# Patient Record
Sex: MALE | Race: BLACK OR AFRICAN AMERICAN | NOT HISPANIC OR LATINO | Employment: UNEMPLOYED | URBAN - METROPOLITAN AREA
[De-identification: names, ages, dates, MRNs, and addresses within clinical notes are randomized per-mention and may not be internally consistent; named-entity substitution may affect disease eponyms.]

---

## 2021-01-13 ENCOUNTER — LAB VISIT (OUTPATIENT)
Dept: LAB | Facility: HOSPITAL | Age: 66
End: 2021-01-13
Attending: FAMILY MEDICINE
Payer: MEDICAID

## 2021-01-13 ENCOUNTER — OFFICE VISIT (OUTPATIENT)
Dept: INTERNAL MEDICINE | Facility: CLINIC | Age: 66
End: 2021-01-13
Payer: MEDICAID

## 2021-01-13 ENCOUNTER — TELEPHONE (OUTPATIENT)
Dept: INTERNAL MEDICINE | Facility: CLINIC | Age: 66
End: 2021-01-13

## 2021-01-13 VITALS
WEIGHT: 311.94 LBS | HEIGHT: 68 IN | HEART RATE: 96 BPM | DIASTOLIC BLOOD PRESSURE: 82 MMHG | OXYGEN SATURATION: 96 % | TEMPERATURE: 100 F | SYSTOLIC BLOOD PRESSURE: 160 MMHG | BODY MASS INDEX: 47.28 KG/M2

## 2021-01-13 DIAGNOSIS — Z12.5 SCREENING FOR PROSTATE CANCER: ICD-10-CM

## 2021-01-13 DIAGNOSIS — I48.91 ATRIAL FIBRILLATION, UNSPECIFIED TYPE: ICD-10-CM

## 2021-01-13 DIAGNOSIS — I50.9 CHRONIC CONGESTIVE HEART FAILURE, UNSPECIFIED HEART FAILURE TYPE: ICD-10-CM

## 2021-01-13 DIAGNOSIS — Z12.11 COLON CANCER SCREENING: ICD-10-CM

## 2021-01-13 DIAGNOSIS — Z28.39 IMMUNIZATION DEFICIENCY: ICD-10-CM

## 2021-01-13 DIAGNOSIS — E11.9 CONTROLLED TYPE 2 DIABETES MELLITUS WITHOUT COMPLICATION, WITHOUT LONG-TERM CURRENT USE OF INSULIN: ICD-10-CM

## 2021-01-13 DIAGNOSIS — R60.9 EDEMA, UNSPECIFIED TYPE: ICD-10-CM

## 2021-01-13 DIAGNOSIS — J30.9 CHRONIC ALLERGIC RHINITIS: ICD-10-CM

## 2021-01-13 DIAGNOSIS — Z76.89 ENCOUNTER TO ESTABLISH CARE WITH NEW DOCTOR: Primary | ICD-10-CM

## 2021-01-13 DIAGNOSIS — E05.90 HYPERTHYROIDISM: ICD-10-CM

## 2021-01-13 LAB
BASOPHILS # BLD AUTO: 0.02 K/UL (ref 0–0.2)
BASOPHILS NFR BLD: 0.2 % (ref 0–1.9)
DIFFERENTIAL METHOD: ABNORMAL
EOSINOPHIL # BLD AUTO: 0 K/UL (ref 0–0.5)
EOSINOPHIL NFR BLD: 0.4 % (ref 0–8)
ERYTHROCYTE [DISTWIDTH] IN BLOOD BY AUTOMATED COUNT: 17.8 % (ref 11.5–14.5)
HCT VFR BLD AUTO: 51.7 % (ref 40–54)
HGB BLD-MCNC: 15.7 G/DL (ref 14–18)
IMM GRANULOCYTES # BLD AUTO: 0.03 K/UL (ref 0–0.04)
IMM GRANULOCYTES NFR BLD AUTO: 0.3 % (ref 0–0.5)
LYMPHOCYTES # BLD AUTO: 2.5 K/UL (ref 1–4.8)
LYMPHOCYTES NFR BLD: 25.2 % (ref 18–48)
MCH RBC QN AUTO: 24.8 PG (ref 27–31)
MCHC RBC AUTO-ENTMCNC: 30.4 G/DL (ref 32–36)
MCV RBC AUTO: 82 FL (ref 82–98)
MONOCYTES # BLD AUTO: 0.9 K/UL (ref 0.3–1)
MONOCYTES NFR BLD: 8.8 % (ref 4–15)
NEUTROPHILS # BLD AUTO: 6.5 K/UL (ref 1.8–7.7)
NEUTROPHILS NFR BLD: 65.1 % (ref 38–73)
NRBC BLD-RTO: 0 /100 WBC
PLATELET # BLD AUTO: 246 K/UL (ref 150–350)
PMV BLD AUTO: 11.9 FL (ref 9.2–12.9)
RBC # BLD AUTO: 6.32 M/UL (ref 4.6–6.2)
WBC # BLD AUTO: 9.98 K/UL (ref 3.9–12.7)

## 2021-01-13 PROCEDURE — 83036 HEMOGLOBIN GLYCOSYLATED A1C: CPT

## 2021-01-13 PROCEDURE — 80061 LIPID PANEL: CPT

## 2021-01-13 PROCEDURE — 90686 IIV4 VACC NO PRSV 0.5 ML IM: CPT | Mod: PBBFAC

## 2021-01-13 PROCEDURE — 99215 OFFICE O/P EST HI 40 MIN: CPT | Mod: PBBFAC,25 | Performed by: FAMILY MEDICINE

## 2021-01-13 PROCEDURE — 84480 ASSAY TRIIODOTHYRONINE (T3): CPT

## 2021-01-13 PROCEDURE — 84443 ASSAY THYROID STIM HORMONE: CPT

## 2021-01-13 PROCEDURE — 99204 OFFICE O/P NEW MOD 45 MIN: CPT | Mod: S$PBB,,, | Performed by: FAMILY MEDICINE

## 2021-01-13 PROCEDURE — 36415 COLL VENOUS BLD VENIPUNCTURE: CPT

## 2021-01-13 PROCEDURE — 99204 PR OFFICE/OUTPT VISIT, NEW, LEVL IV, 45-59 MIN: ICD-10-PCS | Mod: S$PBB,,, | Performed by: FAMILY MEDICINE

## 2021-01-13 PROCEDURE — 84439 ASSAY OF FREE THYROXINE: CPT

## 2021-01-13 PROCEDURE — 99999 PR PBB SHADOW E&M-EST. PATIENT-LVL V: CPT | Mod: PBBFAC,,, | Performed by: FAMILY MEDICINE

## 2021-01-13 PROCEDURE — 99999 PR PBB SHADOW E&M-EST. PATIENT-LVL V: ICD-10-PCS | Mod: PBBFAC,,, | Performed by: FAMILY MEDICINE

## 2021-01-13 PROCEDURE — 85025 COMPLETE CBC W/AUTO DIFF WBC: CPT

## 2021-01-13 PROCEDURE — 84153 ASSAY OF PSA TOTAL: CPT

## 2021-01-13 PROCEDURE — 80053 COMPREHEN METABOLIC PANEL: CPT

## 2021-01-13 RX ORDER — METOPROLOL SUCCINATE 50 MG/1
50 TABLET, EXTENDED RELEASE ORAL DAILY
Qty: 90 TABLET | Refills: 3 | Status: SHIPPED | OUTPATIENT
Start: 2021-01-13 | End: 2022-09-08 | Stop reason: SDUPTHER

## 2021-01-13 RX ORDER — METHIMAZOLE 5 MG/1
5 TABLET ORAL
COMMUNITY
Start: 2020-07-21 | End: 2021-01-13 | Stop reason: SDUPTHER

## 2021-01-13 RX ORDER — METFORMIN HYDROCHLORIDE 500 MG/1
TABLET ORAL
COMMUNITY
Start: 2020-03-25 | End: 2021-01-13 | Stop reason: SDUPTHER

## 2021-01-13 RX ORDER — METHIMAZOLE 5 MG/1
5 TABLET ORAL DAILY
Qty: 90 TABLET | Refills: 3 | Status: SHIPPED | OUTPATIENT
Start: 2021-01-13 | End: 2022-03-11

## 2021-01-13 RX ORDER — CETIRIZINE HYDROCHLORIDE 10 MG/1
10 TABLET ORAL DAILY
Qty: 90 TABLET | Refills: 3 | Status: SHIPPED | OUTPATIENT
Start: 2021-01-13 | End: 2022-09-08 | Stop reason: SDUPTHER

## 2021-01-13 RX ORDER — CETIRIZINE HYDROCHLORIDE 10 MG/1
TABLET ORAL
COMMUNITY
Start: 2020-07-21 | End: 2021-01-13 | Stop reason: SDUPTHER

## 2021-01-13 RX ORDER — METFORMIN HYDROCHLORIDE 500 MG/1
500 TABLET ORAL 2 TIMES DAILY WITH MEALS
Qty: 180 TABLET | Refills: 3 | Status: SHIPPED | OUTPATIENT
Start: 2021-01-13 | End: 2022-09-08 | Stop reason: SDUPTHER

## 2021-01-13 RX ORDER — METOPROLOL SUCCINATE 50 MG/1
TABLET, EXTENDED RELEASE ORAL
COMMUNITY
Start: 2020-06-25 | End: 2021-01-13 | Stop reason: SDUPTHER

## 2021-01-14 DIAGNOSIS — E05.90 HYPERTHYROIDISM: Primary | ICD-10-CM

## 2021-01-14 LAB
ALBUMIN SERPL BCP-MCNC: 3.6 G/DL (ref 3.5–5.2)
ALP SERPL-CCNC: 97 U/L (ref 55–135)
ALT SERPL W/O P-5'-P-CCNC: 14 U/L (ref 10–44)
ANION GAP SERPL CALC-SCNC: 10 MMOL/L (ref 8–16)
AST SERPL-CCNC: 10 U/L (ref 10–40)
BILIRUB SERPL-MCNC: 0.4 MG/DL (ref 0.1–1)
BUN SERPL-MCNC: 11 MG/DL (ref 8–23)
CALCIUM SERPL-MCNC: 8.7 MG/DL (ref 8.7–10.5)
CHLORIDE SERPL-SCNC: 106 MMOL/L (ref 95–110)
CHOLEST SERPL-MCNC: 151 MG/DL (ref 120–199)
CHOLEST/HDLC SERPL: 3.4 {RATIO} (ref 2–5)
CO2 SERPL-SCNC: 24 MMOL/L (ref 23–29)
COMPLEXED PSA SERPL-MCNC: 5.3 NG/ML (ref 0–4)
CREAT SERPL-MCNC: 0.9 MG/DL (ref 0.5–1.4)
EST. GFR  (AFRICAN AMERICAN): >60 ML/MIN/1.73 M^2
EST. GFR  (NON AFRICAN AMERICAN): >60 ML/MIN/1.73 M^2
ESTIMATED AVG GLUCOSE: 120 MG/DL (ref 68–131)
GLUCOSE SERPL-MCNC: 104 MG/DL (ref 70–110)
HBA1C MFR BLD HPLC: 5.8 % (ref 4–5.6)
HDLC SERPL-MCNC: 44 MG/DL (ref 40–75)
HDLC SERPL: 29.1 % (ref 20–50)
LDLC SERPL CALC-MCNC: 93 MG/DL (ref 63–159)
NONHDLC SERPL-MCNC: 107 MG/DL
POTASSIUM SERPL-SCNC: 3.8 MMOL/L (ref 3.5–5.1)
PROT SERPL-MCNC: 7.5 G/DL (ref 6–8.4)
SODIUM SERPL-SCNC: 140 MMOL/L (ref 136–145)
T3 SERPL-MCNC: 177 NG/DL (ref 60–180)
T4 FREE SERPL-MCNC: 1.81 NG/DL (ref 0.71–1.51)
TRIGL SERPL-MCNC: 70 MG/DL (ref 30–150)
TSH SERPL DL<=0.005 MIU/L-ACNC: <0.01 UIU/ML (ref 0.4–4)

## 2021-02-04 ENCOUNTER — OFFICE VISIT (OUTPATIENT)
Dept: INTERNAL MEDICINE | Facility: CLINIC | Age: 66
End: 2021-02-04
Payer: MEDICAID

## 2021-02-04 VITALS
HEART RATE: 106 BPM | WEIGHT: 311.5 LBS | HEIGHT: 68 IN | BODY MASS INDEX: 47.21 KG/M2 | SYSTOLIC BLOOD PRESSURE: 194 MMHG | DIASTOLIC BLOOD PRESSURE: 74 MMHG | TEMPERATURE: 98 F | OXYGEN SATURATION: 97 %

## 2021-02-04 DIAGNOSIS — E11.9 CONTROLLED TYPE 2 DIABETES MELLITUS WITHOUT COMPLICATION, WITHOUT LONG-TERM CURRENT USE OF INSULIN: ICD-10-CM

## 2021-02-04 DIAGNOSIS — I10 HYPERTENSION, UNCONTROLLED: Primary | ICD-10-CM

## 2021-02-04 DIAGNOSIS — I48.91 ATRIAL FIBRILLATION, UNSPECIFIED TYPE: ICD-10-CM

## 2021-02-04 DIAGNOSIS — E05.90 HYPERTHYROIDISM: ICD-10-CM

## 2021-02-04 DIAGNOSIS — K04.7 DENTAL INFECTION: ICD-10-CM

## 2021-02-04 DIAGNOSIS — I50.9 CHRONIC CONGESTIVE HEART FAILURE, UNSPECIFIED HEART FAILURE TYPE: ICD-10-CM

## 2021-02-04 PROCEDURE — 99214 PR OFFICE/OUTPT VISIT, EST, LEVL IV, 30-39 MIN: ICD-10-PCS | Mod: S$PBB,,, | Performed by: FAMILY MEDICINE

## 2021-02-04 PROCEDURE — 99999 PR PBB SHADOW E&M-EST. PATIENT-LVL III: ICD-10-PCS | Mod: PBBFAC,,, | Performed by: FAMILY MEDICINE

## 2021-02-04 PROCEDURE — 99214 OFFICE O/P EST MOD 30 MIN: CPT | Mod: S$PBB,,, | Performed by: FAMILY MEDICINE

## 2021-02-04 PROCEDURE — 99999 PR PBB SHADOW E&M-EST. PATIENT-LVL III: CPT | Mod: PBBFAC,,, | Performed by: FAMILY MEDICINE

## 2021-02-04 PROCEDURE — 99213 OFFICE O/P EST LOW 20 MIN: CPT | Mod: PBBFAC | Performed by: FAMILY MEDICINE

## 2021-02-04 RX ORDER — AMOXICILLIN 500 MG/1
500 CAPSULE ORAL EVERY 8 HOURS
Qty: 30 CAPSULE | Refills: 0 | Status: SHIPPED | OUTPATIENT
Start: 2021-02-04 | End: 2022-03-04

## 2021-02-04 RX ORDER — AMLODIPINE BESYLATE 10 MG/1
10 TABLET ORAL DAILY
Qty: 30 TABLET | Refills: 11 | Status: SHIPPED | OUTPATIENT
Start: 2021-02-04 | End: 2022-09-08 | Stop reason: SDUPTHER

## 2021-03-01 ENCOUNTER — TELEPHONE (OUTPATIENT)
Dept: INTERNAL MEDICINE | Facility: CLINIC | Age: 66
End: 2021-03-01

## 2021-03-08 ENCOUNTER — PATIENT MESSAGE (OUTPATIENT)
Dept: ADMINISTRATIVE | Facility: HOSPITAL | Age: 66
End: 2021-03-08

## 2021-04-14 ENCOUNTER — PATIENT OUTREACH (OUTPATIENT)
Dept: ADMINISTRATIVE | Facility: OTHER | Age: 66
End: 2021-04-14

## 2021-04-15 ENCOUNTER — OFFICE VISIT (OUTPATIENT)
Dept: OPHTHALMOLOGY | Facility: CLINIC | Age: 66
End: 2021-04-15
Payer: MEDICAID

## 2021-04-15 DIAGNOSIS — H40.013 AT LOW RISK FOR OPEN-ANGLE GLAUCOMA IN BOTH EYES: ICD-10-CM

## 2021-04-15 DIAGNOSIS — E11.36 DIABETIC CATARACT: ICD-10-CM

## 2021-04-15 DIAGNOSIS — E11.9 DIABETES MELLITUS TYPE 2 WITHOUT RETINOPATHY: Primary | ICD-10-CM

## 2021-04-15 DIAGNOSIS — H52.7 REFRACTIVE ERRORS: ICD-10-CM

## 2021-04-15 PROCEDURE — 92015 DETERMINE REFRACTIVE STATE: CPT | Mod: ,,, | Performed by: OPTOMETRIST

## 2021-04-15 PROCEDURE — 92004 PR EYE EXAM, NEW PATIENT,COMPREHESV: ICD-10-PCS | Mod: S$PBB,,, | Performed by: OPTOMETRIST

## 2021-04-15 PROCEDURE — 92004 COMPRE OPH EXAM NEW PT 1/>: CPT | Mod: S$PBB,,, | Performed by: OPTOMETRIST

## 2021-04-15 PROCEDURE — 99212 OFFICE O/P EST SF 10 MIN: CPT | Mod: PBBFAC | Performed by: OPTOMETRIST

## 2021-04-15 PROCEDURE — 92015 PR REFRACTION: ICD-10-PCS | Mod: ,,, | Performed by: OPTOMETRIST

## 2021-04-15 PROCEDURE — 99999 PR PBB SHADOW E&M-EST. PATIENT-LVL II: CPT | Mod: PBBFAC,,, | Performed by: OPTOMETRIST

## 2021-04-15 PROCEDURE — 99999 PR PBB SHADOW E&M-EST. PATIENT-LVL II: ICD-10-PCS | Mod: PBBFAC,,, | Performed by: OPTOMETRIST

## 2021-04-29 ENCOUNTER — PATIENT OUTREACH (OUTPATIENT)
Dept: ADMINISTRATIVE | Facility: HOSPITAL | Age: 66
End: 2021-04-29

## 2021-06-11 ENCOUNTER — TELEPHONE (OUTPATIENT)
Dept: OPHTHALMOLOGY | Facility: CLINIC | Age: 66
End: 2021-06-11

## 2021-06-16 ENCOUNTER — PATIENT OUTREACH (OUTPATIENT)
Dept: ADMINISTRATIVE | Facility: OTHER | Age: 66
End: 2021-06-16

## 2021-07-28 DIAGNOSIS — E11.9 TYPE 2 DIABETES MELLITUS WITHOUT COMPLICATION: ICD-10-CM

## 2021-08-04 ENCOUNTER — PATIENT MESSAGE (OUTPATIENT)
Dept: ADMINISTRATIVE | Facility: HOSPITAL | Age: 66
End: 2021-08-04

## 2021-11-03 ENCOUNTER — PATIENT MESSAGE (OUTPATIENT)
Dept: ADMINISTRATIVE | Facility: HOSPITAL | Age: 66
End: 2021-11-03
Payer: MEDICAID

## 2022-01-19 DIAGNOSIS — E11.9 TYPE 2 DIABETES MELLITUS WITHOUT COMPLICATION: ICD-10-CM

## 2022-02-03 DIAGNOSIS — E11.9 CONTROLLED TYPE 2 DIABETES MELLITUS WITHOUT COMPLICATION, WITHOUT LONG-TERM CURRENT USE OF INSULIN: ICD-10-CM

## 2022-02-24 ENCOUNTER — PATIENT OUTREACH (OUTPATIENT)
Dept: ADMINISTRATIVE | Facility: HOSPITAL | Age: 67
End: 2022-02-24
Payer: MEDICAID

## 2022-02-24 DIAGNOSIS — E11.9 CONTROLLED TYPE 2 DIABETES MELLITUS WITHOUT COMPLICATION, WITHOUT LONG-TERM CURRENT USE OF INSULIN: Primary | ICD-10-CM

## 2022-02-24 NOTE — PROGRESS NOTES
Working A1C Report.    Pt has appt for annual exam scheduled, 3/04/22.  Assisted to schedule labs prior to appt, 2/28/22.

## 2022-02-28 ENCOUNTER — LAB VISIT (OUTPATIENT)
Dept: LAB | Facility: HOSPITAL | Age: 67
End: 2022-02-28
Attending: FAMILY MEDICINE
Payer: MEDICAID

## 2022-02-28 ENCOUNTER — PATIENT OUTREACH (OUTPATIENT)
Dept: ADMINISTRATIVE | Facility: HOSPITAL | Age: 67
End: 2022-02-28
Payer: MEDICAID

## 2022-02-28 DIAGNOSIS — E11.9 CONTROLLED TYPE 2 DIABETES MELLITUS WITHOUT COMPLICATION, WITHOUT LONG-TERM CURRENT USE OF INSULIN: ICD-10-CM

## 2022-02-28 DIAGNOSIS — E11.9 TYPE 2 DIABETES MELLITUS WITHOUT COMPLICATION: ICD-10-CM

## 2022-02-28 LAB
ALBUMIN SERPL BCP-MCNC: 3.4 G/DL (ref 3.5–5.2)
ALP SERPL-CCNC: 80 U/L (ref 55–135)
ALT SERPL W/O P-5'-P-CCNC: 11 U/L (ref 10–44)
ANION GAP SERPL CALC-SCNC: 9 MMOL/L (ref 8–16)
AST SERPL-CCNC: 9 U/L (ref 10–40)
BILIRUB SERPL-MCNC: 0.3 MG/DL (ref 0.1–1)
BUN SERPL-MCNC: 9 MG/DL (ref 8–23)
CALCIUM SERPL-MCNC: 9.1 MG/DL (ref 8.7–10.5)
CHLORIDE SERPL-SCNC: 107 MMOL/L (ref 95–110)
CHOLEST SERPL-MCNC: 141 MG/DL (ref 120–199)
CHOLEST/HDLC SERPL: 3.1 {RATIO} (ref 2–5)
CO2 SERPL-SCNC: 24 MMOL/L (ref 23–29)
CREAT SERPL-MCNC: 0.8 MG/DL (ref 0.5–1.4)
EST. GFR  (AFRICAN AMERICAN): >60 ML/MIN/1.73 M^2
EST. GFR  (NON AFRICAN AMERICAN): >60 ML/MIN/1.73 M^2
ESTIMATED AVG GLUCOSE: 123 MG/DL (ref 68–131)
GLUCOSE SERPL-MCNC: 105 MG/DL (ref 70–110)
HBA1C MFR BLD: 5.9 % (ref 4–5.6)
HDLC SERPL-MCNC: 45 MG/DL (ref 40–75)
HDLC SERPL: 31.9 % (ref 20–50)
LDLC SERPL CALC-MCNC: 78 MG/DL (ref 63–159)
NONHDLC SERPL-MCNC: 96 MG/DL
POTASSIUM SERPL-SCNC: 3.9 MMOL/L (ref 3.5–5.1)
PROT SERPL-MCNC: 7.3 G/DL (ref 6–8.4)
SODIUM SERPL-SCNC: 140 MMOL/L (ref 136–145)
TRIGL SERPL-MCNC: 90 MG/DL (ref 30–150)

## 2022-02-28 PROCEDURE — 36415 COLL VENOUS BLD VENIPUNCTURE: CPT | Performed by: FAMILY MEDICINE

## 2022-02-28 PROCEDURE — 80053 COMPREHEN METABOLIC PANEL: CPT | Performed by: FAMILY MEDICINE

## 2022-02-28 PROCEDURE — 83036 HEMOGLOBIN GLYCOSYLATED A1C: CPT | Performed by: FAMILY MEDICINE

## 2022-02-28 PROCEDURE — 80061 LIPID PANEL: CPT | Performed by: FAMILY MEDICINE

## 2022-03-04 ENCOUNTER — LAB VISIT (OUTPATIENT)
Dept: LAB | Facility: HOSPITAL | Age: 67
End: 2022-03-04
Attending: INTERNAL MEDICINE
Payer: MEDICAID

## 2022-03-04 ENCOUNTER — OFFICE VISIT (OUTPATIENT)
Dept: INTERNAL MEDICINE | Facility: CLINIC | Age: 67
End: 2022-03-04
Payer: MEDICAID

## 2022-03-04 ENCOUNTER — TELEPHONE (OUTPATIENT)
Dept: INTERNAL MEDICINE | Facility: CLINIC | Age: 67
End: 2022-03-04
Payer: MEDICAID

## 2022-03-04 VITALS
HEIGHT: 71 IN | DIASTOLIC BLOOD PRESSURE: 80 MMHG | WEIGHT: 315 LBS | BODY MASS INDEX: 44.1 KG/M2 | HEART RATE: 83 BPM | SYSTOLIC BLOOD PRESSURE: 154 MMHG | OXYGEN SATURATION: 98 %

## 2022-03-04 DIAGNOSIS — I50.9 CHRONIC CONGESTIVE HEART FAILURE, UNSPECIFIED HEART FAILURE TYPE: ICD-10-CM

## 2022-03-04 DIAGNOSIS — E05.90 HYPERTHYROIDISM: ICD-10-CM

## 2022-03-04 DIAGNOSIS — I48.91 ATRIAL FIBRILLATION, UNSPECIFIED TYPE: ICD-10-CM

## 2022-03-04 DIAGNOSIS — R60.9 EDEMA, UNSPECIFIED TYPE: ICD-10-CM

## 2022-03-04 DIAGNOSIS — E11.59 HYPERTENSION ASSOCIATED WITH DIABETES: ICD-10-CM

## 2022-03-04 DIAGNOSIS — I25.10 CORONARY ARTERY DISEASE, UNSPECIFIED VESSEL OR LESION TYPE, UNSPECIFIED WHETHER ANGINA PRESENT, UNSPECIFIED WHETHER NATIVE OR TRANSPLANTED HEART: ICD-10-CM

## 2022-03-04 DIAGNOSIS — I15.2 HYPERTENSION ASSOCIATED WITH DIABETES: ICD-10-CM

## 2022-03-04 DIAGNOSIS — E11.9 CONTROLLED TYPE 2 DIABETES MELLITUS WITHOUT COMPLICATION, WITHOUT LONG-TERM CURRENT USE OF INSULIN: ICD-10-CM

## 2022-03-04 DIAGNOSIS — Z00.00 ROUTINE GENERAL MEDICAL EXAMINATION AT A HEALTH CARE FACILITY: Primary | ICD-10-CM

## 2022-03-04 PROBLEM — E66.01 MORBID OBESITY WITH BODY MASS INDEX (BMI) OF 40.0 TO 44.9 IN ADULT: Status: ACTIVE | Noted: 2022-03-04

## 2022-03-04 LAB
BNP SERPL-MCNC: <10 PG/ML (ref 0–99)
DIGOXIN SERPL-MCNC: <0.2 NG/ML (ref 0.8–2)
T4 FREE SERPL-MCNC: 1.08 NG/DL (ref 0.71–1.51)
TSH SERPL DL<=0.005 MIU/L-ACNC: <0.01 UIU/ML (ref 0.4–4)

## 2022-03-04 PROCEDURE — 99999 PR PBB SHADOW E&M-EST. PATIENT-LVL III: CPT | Mod: PBBFAC,,, | Performed by: INTERNAL MEDICINE

## 2022-03-04 PROCEDURE — 99999 PR PBB SHADOW E&M-EST. PATIENT-LVL III: ICD-10-PCS | Mod: PBBFAC,,, | Performed by: INTERNAL MEDICINE

## 2022-03-04 PROCEDURE — 99396 PR PREVENTIVE VISIT,EST,40-64: ICD-10-PCS | Mod: S$PBB,,, | Performed by: INTERNAL MEDICINE

## 2022-03-04 PROCEDURE — 99213 OFFICE O/P EST LOW 20 MIN: CPT | Mod: PBBFAC,PO | Performed by: INTERNAL MEDICINE

## 2022-03-04 PROCEDURE — 1160F PR REVIEW ALL MEDS BY PRESCRIBER/CLIN PHARMACIST DOCUMENTED: ICD-10-PCS | Mod: CPTII,,, | Performed by: INTERNAL MEDICINE

## 2022-03-04 PROCEDURE — 80162 ASSAY OF DIGOXIN TOTAL: CPT | Performed by: INTERNAL MEDICINE

## 2022-03-04 PROCEDURE — 93010 ELECTROCARDIOGRAM REPORT: CPT | Mod: ,,, | Performed by: INTERNAL MEDICINE

## 2022-03-04 PROCEDURE — 83880 ASSAY OF NATRIURETIC PEPTIDE: CPT | Performed by: INTERNAL MEDICINE

## 2022-03-04 PROCEDURE — 3066F PR DOCUMENTATION OF TREATMENT FOR NEPHROPATHY: ICD-10-PCS | Mod: CPTII,,, | Performed by: INTERNAL MEDICINE

## 2022-03-04 PROCEDURE — 3079F DIAST BP 80-89 MM HG: CPT | Mod: CPTII,,, | Performed by: INTERNAL MEDICINE

## 2022-03-04 PROCEDURE — 3072F PR LOW RISK FOR RETINOPATHY: ICD-10-PCS | Mod: CPTII,,, | Performed by: INTERNAL MEDICINE

## 2022-03-04 PROCEDURE — 3044F HG A1C LEVEL LT 7.0%: CPT | Mod: CPTII,,, | Performed by: INTERNAL MEDICINE

## 2022-03-04 PROCEDURE — 3072F LOW RISK FOR RETINOPATHY: CPT | Mod: CPTII,,, | Performed by: INTERNAL MEDICINE

## 2022-03-04 PROCEDURE — 1159F PR MEDICATION LIST DOCUMENTED IN MEDICAL RECORD: ICD-10-PCS | Mod: CPTII,,, | Performed by: INTERNAL MEDICINE

## 2022-03-04 PROCEDURE — 84439 ASSAY OF FREE THYROXINE: CPT | Performed by: INTERNAL MEDICINE

## 2022-03-04 PROCEDURE — 3077F PR MOST RECENT SYSTOLIC BLOOD PRESSURE >= 140 MM HG: ICD-10-PCS | Mod: CPTII,,, | Performed by: INTERNAL MEDICINE

## 2022-03-04 PROCEDURE — 3008F PR BODY MASS INDEX (BMI) DOCUMENTED: ICD-10-PCS | Mod: CPTII,,, | Performed by: INTERNAL MEDICINE

## 2022-03-04 PROCEDURE — 3008F BODY MASS INDEX DOCD: CPT | Mod: CPTII,,, | Performed by: INTERNAL MEDICINE

## 2022-03-04 PROCEDURE — 93010 EKG 12-LEAD: ICD-10-PCS | Mod: ,,, | Performed by: INTERNAL MEDICINE

## 2022-03-04 PROCEDURE — 84443 ASSAY THYROID STIM HORMONE: CPT | Performed by: INTERNAL MEDICINE

## 2022-03-04 PROCEDURE — 3079F PR MOST RECENT DIASTOLIC BLOOD PRESSURE 80-89 MM HG: ICD-10-PCS | Mod: CPTII,,, | Performed by: INTERNAL MEDICINE

## 2022-03-04 PROCEDURE — 1160F RVW MEDS BY RX/DR IN RCRD: CPT | Mod: CPTII,,, | Performed by: INTERNAL MEDICINE

## 2022-03-04 PROCEDURE — 1159F MED LIST DOCD IN RCRD: CPT | Mod: CPTII,,, | Performed by: INTERNAL MEDICINE

## 2022-03-04 PROCEDURE — 3077F SYST BP >= 140 MM HG: CPT | Mod: CPTII,,, | Performed by: INTERNAL MEDICINE

## 2022-03-04 PROCEDURE — 36415 COLL VENOUS BLD VENIPUNCTURE: CPT | Mod: PO | Performed by: INTERNAL MEDICINE

## 2022-03-04 PROCEDURE — 3044F PR MOST RECENT HEMOGLOBIN A1C LEVEL <7.0%: ICD-10-PCS | Mod: CPTII,,, | Performed by: INTERNAL MEDICINE

## 2022-03-04 PROCEDURE — 99396 PREV VISIT EST AGE 40-64: CPT | Mod: S$PBB,,, | Performed by: INTERNAL MEDICINE

## 2022-03-04 PROCEDURE — 3061F PR NEG MICROALBUMINURIA RESULT DOCUMENTED/REVIEW: ICD-10-PCS | Mod: CPTII,,, | Performed by: INTERNAL MEDICINE

## 2022-03-04 PROCEDURE — 3061F NEG MICROALBUMINURIA REV: CPT | Mod: CPTII,,, | Performed by: INTERNAL MEDICINE

## 2022-03-04 PROCEDURE — 3066F NEPHROPATHY DOC TX: CPT | Mod: CPTII,,, | Performed by: INTERNAL MEDICINE

## 2022-03-04 PROCEDURE — 93005 ELECTROCARDIOGRAM TRACING: CPT

## 2022-03-04 NOTE — TELEPHONE ENCOUNTER
----- Message from Fabiana Colbert sent at 3/4/2022  2:54 PM CST -----  Pt would like to notify the office he is outside for his appointment. Please call him at [phone]

## 2022-03-04 NOTE — PROGRESS NOTES
HPI:  Patient is 66-year-old man who comes today for his initial visit myself to establish care.  Patient this time has no acute medical problems or complaints.  Patient unfortunately is not a very good historian about his past medical history.  States that he had a attack in 2014 and was unconscious in a coma for several days.  He does not know if anything was done for the heart catheterization stenting are anything.  He does recall being told he had atrial fibrillation it told that he went back into normal rhythm.  He has been on anticoagulations ever since.  Patient states he has had diabetes for 3 or 4 years.  He does not check his blood sugar.  His most recent hemoglobin A1c was 5.8.  He also has a history of hypertension.  Unfortunately he does not check his blood pressure at home.  He states whenever he goes the doctors always elevated but he sure that it is normal at home even though he is not checking it.  Patient was placed on Tapazole for hyperthyroidism several years ago.  Patient unfortunately does not know if he is taking the Tapazole at all.  His previous primary care doctor wrote a prescription 13 months ago for year supply but the patient himself does not think he is taking it.  Unfortunately he does not know the names of his medications other than the anticoagulant Eliquis.      Current MEDS: medcard review, verified and update  Allergies: Per the electronic medical record    Past Medical History:   Diagnosis Date    Atrial fibrillation 01/13/2021    CAD (coronary artery disease)     had MI 2014    Chronic congestive heart failure 01/13/2021    Diabetes mellitus with no complication     Hypertension associated with diabetes     Hyperthyroidism 01/13/2021    Morbid obesity with body mass index (BMI) of 40.0 to 44.9 in adult        Past Surgical History:   Procedure Laterality Date    APPENDECTOMY      Twice       SHx: per the electronic medical record    FHx: recorded in the electronic  "medical record    ROS:    denies any chest pains or shortness of breath. Denies any nausea, vomiting or diarrhea. Denies any fever, chills or sweats. Denies any change in weight, voice, stool, skin or hair. Denies any dysuria, dyspepsia or dysphagia. Denies any change in vision, hearing or headaches. Denies any swollen lymph nodes or loss of memory.    PE:  BP (!) 154/80 (BP Location: Left arm)   Pulse 83   Ht 5' 11" (1.803 m)   Wt (!) 144 kg (317 lb 7.4 oz)   SpO2 98%   BMI 44.28 kg/m²   Gen: Well-developed, well-nourished, male, in no acute distress, oriented x3, morbidly obese  HEENT: neck is supple, no adenopathy, carotids 2+ equal without bruits, thyroid exam normal size without nodules.  CHEST: clear to auscultation and percussion  CVS: regular rate and rhythm without significant murmur, gallop, or rubs  ABD: soft, benign, no rebound no guarding, no distention.  Bowel sounds are normal.     nontender.  No palpable masses.  No organomegaly and no audible bruits.  RECTAL:  Deferred  EXT: no clubbing, cyanosis, or edema  LYMPH: no cervical, inguinal, or axillary adenopathy  FEET: no loss of sensation.  No ulcers or pressure sores.  Monofilament testing normal  NEURO: gait normal.  Cranial nerves II- XII intact. No nystagmus.  Speech normal.   Gross motor and sensory unremarkable.    EKG shows normal sinus rhythm with a right bundle branch block    Lab Results   Component Value Date    WBC 9.98 01/13/2021    HGB 15.7 01/13/2021    HCT 51.7 01/13/2021     01/13/2021    CHOL 141 02/28/2022    TRIG 90 02/28/2022    HDL 45 02/28/2022    ALT 11 02/28/2022    AST 9 (L) 02/28/2022     02/28/2022    K 3.9 02/28/2022     02/28/2022    CREATININE 0.8 02/28/2022    BUN 9 02/28/2022    CO2 24 02/28/2022    TSH <0.010 (L) 01/13/2021    PSA 5.3 (H) 01/13/2021    HGBA1C 5.9 (H) 02/28/2022       Impression:  Numerous medical problems below,  all need to be reassessed.  Patient Active Problem List "   Diagnosis    Atrial fibrillation    Edema    Chronic allergic rhinitis    Hyperthyroidism    Chronic congestive heart failure    Diabetes mellitus with no complication    Hypertension associated with diabetes    Morbid obesity with body mass index (BMI) of 40.0 to 44.9 in adult    CAD (coronary artery disease)       Plan:   Orders Placed This Encounter    Microalbumin/Creatinine Ratio, Urine    TSH    T4, Free    Digoxin Level    BNP    Ambulatory referral/consult to Cardiology    EKG 12-lead    Echo     Patient will have the EKG done today.  He will have lab work done Monday.  He will see me back next week and bring all of his medications with him that he has at home the know exactly what he has and has not been taking.  He will be set up to get an echocardiogram done and referred to see cardiologist.    This note is generated with speech recognition software and is subject to transcription error and sound alike phrases that may be missed by proofreading.

## 2022-03-08 ENCOUNTER — HOSPITAL ENCOUNTER (OUTPATIENT)
Dept: CARDIOLOGY | Facility: HOSPITAL | Age: 67
Discharge: HOME | End: 2022-03-08
Attending: INTERNAL MEDICINE
Payer: MEDICAID

## 2022-03-08 VITALS
WEIGHT: 315 LBS | DIASTOLIC BLOOD PRESSURE: 80 MMHG | BODY MASS INDEX: 45.1 KG/M2 | HEIGHT: 70 IN | SYSTOLIC BLOOD PRESSURE: 154 MMHG

## 2022-03-08 DIAGNOSIS — I50.9 CHRONIC CONGESTIVE HEART FAILURE, UNSPECIFIED HEART FAILURE TYPE: ICD-10-CM

## 2022-03-08 DIAGNOSIS — I48.91 ATRIAL FIBRILLATION, UNSPECIFIED TYPE: ICD-10-CM

## 2022-03-08 LAB
AORTIC ROOT ANNULUS: 3.39 CM
ASCENDING AORTA: 3.17 CM
AV INDEX (PROSTH): 0.95
AV MEAN GRADIENT: 3 MMHG
AV PEAK GRADIENT: 5 MMHG
AV VALVE AREA: 4.56 CM2
AV VELOCITY RATIO: 0.81
BSA FOR ECHO PROCEDURE: 2.66 M2
CV ECHO LV RWT: 0.5 CM
DOP CALC AO PEAK VEL: 1.13 M/S
DOP CALC AO VTI: 23.1 CM
DOP CALC LVOT AREA: 4.8 CM2
DOP CALC LVOT DIAMETER: 2.47 CM
DOP CALC LVOT PEAK VEL: 0.92 M/S
DOP CALC LVOT STROKE VOLUME: 105.36 CM3
DOP CALC RVOT PEAK VEL: 0.76 M/S
DOP CALC RVOT VTI: 16.2 CM
DOP CALCLVOT PEAK VEL VTI: 22 CM
E WAVE DECELERATION TIME: 249.35 MSEC
E/A RATIO: 0.95
E/E' RATIO: 7.41 M/S
ECHO EF ESTIMATED: 66 %
ECHO LV POSTERIOR WALL: 1.36 CM (ref 0.6–1.1)
EJECTION FRACTION: 65 %
FRACTIONAL SHORTENING: 37 % (ref 28–44)
INTERVENTRICULAR SEPTUM: 1.34 CM (ref 0.6–1.1)
IVC DIAMETER: 2.83 CM
IVRT: 82.78 MSEC
LA MAJOR: 6.2 CM
LA MINOR: 6 CM
LA WIDTH: 3.78 CM
LEFT ATRIUM SIZE: 4.81 CM
LEFT ATRIUM VOLUME INDEX MOD: 13.6 ML/M2
LEFT ATRIUM VOLUME INDEX: 37.1 ML/M2
LEFT ATRIUM VOLUME MOD: 34.67 CM3
LEFT ATRIUM VOLUME: 94.25 CM3
LEFT INTERNAL DIMENSION IN SYSTOLE: 3.45 CM (ref 2.1–4)
LEFT VENTRICLE DIASTOLIC VOLUME INDEX: 56.78 ML/M2
LEFT VENTRICLE DIASTOLIC VOLUME: 144.22 ML
LEFT VENTRICLE MASS INDEX: 125 G/M2
LEFT VENTRICLE SYSTOLIC VOLUME INDEX: 19.4 ML/M2
LEFT VENTRICLE SYSTOLIC VOLUME: 49.26 ML
LEFT VENTRICULAR INTERNAL DIMENSION IN DIASTOLE: 5.45 CM (ref 3.5–6)
LEFT VENTRICULAR MASS: 316.31 G
LV LATERAL E/E' RATIO: 5.73 M/S
LV SEPTAL E/E' RATIO: 10.5 M/S
LVOT MG: 2.03 MMHG
LVOT MV: 0.68 CM/S
MV PEAK A VEL: 0.66 M/S
MV PEAK E VEL: 0.63 M/S
PISA TR MAX VEL: 1.59 M/S
PULM VEIN S/D RATIO: 1.41
PV MEAN GRADIENT: 1.36 MMHG
PV PEAK D VEL: 0.37 M/S
PV PEAK S VEL: 0.52 M/S
PV PEAK VELOCITY: 0.99 CM/S
RA MAJOR: 5.54 CM
RA PRESSURE: 8 MMHG
RA WIDTH: 3.51 CM
RIGHT VENTRICULAR END-DIASTOLIC DIMENSION: 4.02 CM
SINUS: 3.59 CM
STJ: 3.56 CM
TDI LATERAL: 0.11 M/S
TDI SEPTAL: 0.06 M/S
TDI: 0.09 M/S
TR MAX PG: 10 MMHG
TV REST PULMONARY ARTERY PRESSURE: 18 MMHG

## 2022-03-08 PROCEDURE — 93306 ECHO (CUPID ONLY): ICD-10-PCS | Mod: 26,,, | Performed by: STUDENT IN AN ORGANIZED HEALTH CARE EDUCATION/TRAINING PROGRAM

## 2022-03-08 PROCEDURE — 93306 TTE W/DOPPLER COMPLETE: CPT

## 2022-03-08 PROCEDURE — 93306 TTE W/DOPPLER COMPLETE: CPT | Mod: 26,,, | Performed by: STUDENT IN AN ORGANIZED HEALTH CARE EDUCATION/TRAINING PROGRAM

## 2022-03-11 ENCOUNTER — OFFICE VISIT (OUTPATIENT)
Dept: INTERNAL MEDICINE | Facility: CLINIC | Age: 67
End: 2022-03-11
Payer: MEDICAID

## 2022-03-11 VITALS
BODY MASS INDEX: 43.65 KG/M2 | DIASTOLIC BLOOD PRESSURE: 100 MMHG | SYSTOLIC BLOOD PRESSURE: 150 MMHG | OXYGEN SATURATION: 99 % | HEIGHT: 71 IN | HEART RATE: 82 BPM | WEIGHT: 311.75 LBS

## 2022-03-11 DIAGNOSIS — I25.10 CORONARY ARTERY DISEASE, UNSPECIFIED VESSEL OR LESION TYPE, UNSPECIFIED WHETHER ANGINA PRESENT, UNSPECIFIED WHETHER NATIVE OR TRANSPLANTED HEART: ICD-10-CM

## 2022-03-11 DIAGNOSIS — E66.01 MORBID OBESITY WITH BODY MASS INDEX (BMI) OF 40.0 TO 44.9 IN ADULT: ICD-10-CM

## 2022-03-11 DIAGNOSIS — I15.2 HYPERTENSION ASSOCIATED WITH DIABETES: Primary | ICD-10-CM

## 2022-03-11 DIAGNOSIS — E05.90 HYPERTHYROIDISM: ICD-10-CM

## 2022-03-11 DIAGNOSIS — I50.9 CHRONIC CONGESTIVE HEART FAILURE, UNSPECIFIED HEART FAILURE TYPE: ICD-10-CM

## 2022-03-11 DIAGNOSIS — E11.9 DIABETES MELLITUS WITH NO COMPLICATION: ICD-10-CM

## 2022-03-11 DIAGNOSIS — E11.59 HYPERTENSION ASSOCIATED WITH DIABETES: Primary | ICD-10-CM

## 2022-03-11 DIAGNOSIS — R60.9 EDEMA, UNSPECIFIED TYPE: ICD-10-CM

## 2022-03-11 PROBLEM — I48.0 PAROXYSMAL A-FIB: Status: ACTIVE | Noted: 2022-03-11

## 2022-03-11 PROCEDURE — 1159F MED LIST DOCD IN RCRD: CPT | Mod: CPTII,,, | Performed by: INTERNAL MEDICINE

## 2022-03-11 PROCEDURE — 99213 OFFICE O/P EST LOW 20 MIN: CPT | Mod: PBBFAC,PO | Performed by: INTERNAL MEDICINE

## 2022-03-11 PROCEDURE — 3072F LOW RISK FOR RETINOPATHY: CPT | Mod: CPTII,,, | Performed by: INTERNAL MEDICINE

## 2022-03-11 PROCEDURE — 3080F PR MOST RECENT DIASTOLIC BLOOD PRESSURE >= 90 MM HG: ICD-10-PCS | Mod: CPTII,,, | Performed by: INTERNAL MEDICINE

## 2022-03-11 PROCEDURE — 99214 OFFICE O/P EST MOD 30 MIN: CPT | Mod: S$PBB,,, | Performed by: INTERNAL MEDICINE

## 2022-03-11 PROCEDURE — 3066F NEPHROPATHY DOC TX: CPT | Mod: CPTII,,, | Performed by: INTERNAL MEDICINE

## 2022-03-11 PROCEDURE — 1159F PR MEDICATION LIST DOCUMENTED IN MEDICAL RECORD: ICD-10-PCS | Mod: CPTII,,, | Performed by: INTERNAL MEDICINE

## 2022-03-11 PROCEDURE — 3077F PR MOST RECENT SYSTOLIC BLOOD PRESSURE >= 140 MM HG: ICD-10-PCS | Mod: CPTII,,, | Performed by: INTERNAL MEDICINE

## 2022-03-11 PROCEDURE — 3008F PR BODY MASS INDEX (BMI) DOCUMENTED: ICD-10-PCS | Mod: CPTII,,, | Performed by: INTERNAL MEDICINE

## 2022-03-11 PROCEDURE — 3080F DIAST BP >= 90 MM HG: CPT | Mod: CPTII,,, | Performed by: INTERNAL MEDICINE

## 2022-03-11 PROCEDURE — 99999 PR PBB SHADOW E&M-EST. PATIENT-LVL III: ICD-10-PCS | Mod: PBBFAC,,, | Performed by: INTERNAL MEDICINE

## 2022-03-11 PROCEDURE — 3077F SYST BP >= 140 MM HG: CPT | Mod: CPTII,,, | Performed by: INTERNAL MEDICINE

## 2022-03-11 PROCEDURE — 3061F PR NEG MICROALBUMINURIA RESULT DOCUMENTED/REVIEW: ICD-10-PCS | Mod: CPTII,,, | Performed by: INTERNAL MEDICINE

## 2022-03-11 PROCEDURE — 3008F BODY MASS INDEX DOCD: CPT | Mod: CPTII,,, | Performed by: INTERNAL MEDICINE

## 2022-03-11 PROCEDURE — 3044F HG A1C LEVEL LT 7.0%: CPT | Mod: CPTII,,, | Performed by: INTERNAL MEDICINE

## 2022-03-11 PROCEDURE — 3061F NEG MICROALBUMINURIA REV: CPT | Mod: CPTII,,, | Performed by: INTERNAL MEDICINE

## 2022-03-11 PROCEDURE — 99214 PR OFFICE/OUTPT VISIT, EST, LEVL IV, 30-39 MIN: ICD-10-PCS | Mod: S$PBB,,, | Performed by: INTERNAL MEDICINE

## 2022-03-11 PROCEDURE — 3072F PR LOW RISK FOR RETINOPATHY: ICD-10-PCS | Mod: CPTII,,, | Performed by: INTERNAL MEDICINE

## 2022-03-11 PROCEDURE — 99999 PR PBB SHADOW E&M-EST. PATIENT-LVL III: CPT | Mod: PBBFAC,,, | Performed by: INTERNAL MEDICINE

## 2022-03-11 PROCEDURE — 3066F PR DOCUMENTATION OF TREATMENT FOR NEPHROPATHY: ICD-10-PCS | Mod: CPTII,,, | Performed by: INTERNAL MEDICINE

## 2022-03-11 PROCEDURE — 3044F PR MOST RECENT HEMOGLOBIN A1C LEVEL <7.0%: ICD-10-PCS | Mod: CPTII,,, | Performed by: INTERNAL MEDICINE

## 2022-03-11 RX ORDER — FUROSEMIDE 40 MG/1
40 TABLET ORAL DAILY
Qty: 90 TABLET | Refills: 3 | Status: SHIPPED | OUTPATIENT
Start: 2022-03-11 | End: 2022-09-08 | Stop reason: SDUPTHER

## 2022-03-11 RX ORDER — LISINOPRIL 20 MG/1
20 TABLET ORAL DAILY
Qty: 90 TABLET | Refills: 3 | Status: SHIPPED | OUTPATIENT
Start: 2022-03-11 | End: 2022-09-08 | Stop reason: SDUPTHER

## 2022-03-11 NOTE — PROGRESS NOTES
"HPI:  Patient is a 66-year-old man who comes back in today for completion of his initial workup.  Patient brings all his medications with him.  He has not been taking the methimazole.  Patient medications are all updated.  His EKG shows normal sinus rhythm.  His echocardiogram showed normal systolic function.  He has a normal ejection fraction.  His diastolic function was indeterminate.  His BNP was completely normal.    Current meds have been verified and updated per the EMR  Exam:BP (!) 150/100 (BP Location: Left arm)   Pulse 82   Ht 5' 11" (1.803 m)   Wt (!) 141.4 kg (311 lb 11.7 oz)   SpO2 99%   BMI 43.48 kg/m²   Chest clear  Cardiovascular regular rate and rhythm without murmur gallop or rub  Extremities with changes consistent with chronic venous insufficiency and stasis.  Patient has 2+ bilateral lower extremity edema    Lab Results   Component Value Date    WBC 9.98 01/13/2021    HGB 15.7 01/13/2021    HCT 51.7 01/13/2021     01/13/2021    CHOL 141 02/28/2022    TRIG 90 02/28/2022    HDL 45 02/28/2022    ALT 11 02/28/2022    AST 9 (L) 02/28/2022     02/28/2022    K 3.9 02/28/2022     02/28/2022    CREATININE 0.8 02/28/2022    BUN 9 02/28/2022    CO2 24 02/28/2022    TSH <0.010 (L) 03/04/2022    PSA 5.3 (H) 01/13/2021    HGBA1C 5.9 (H) 02/28/2022   Free T4 was normal    Impression:  Questionable hyperthyroidism with a totally suppressed TSH but a normal free T4.  Will get a nuclear thyroid scan with uptake.  Doubtful whether he has chronic congestive heart failure.  Patient has appointment see the cardiologist next week.  History of AFib in the past.  No idea whether he goes in and out of it or not.  Will also let the cardiologists I was not he needs be on Eliquis long-term  Chronic venous stasis/insufficiency  Patient Active Problem List   Diagnosis    Edema    Chronic allergic rhinitis    Hyperthyroidism    Chronic congestive heart failure    Diabetes mellitus with no " complication    Hypertension associated with diabetes    Morbid obesity with body mass index (BMI) of 40.0 to 44.9 in adult    CAD (coronary artery disease)    Paroxysmal A-fib       Plan:  Orders Placed This Encounter    NM Thyroid Uptake and Scan    Hemoglobin A1C    TSH    Basic Metabolic Panel    furosemide (LASIX) 40 MG tablet    lisinopriL (PRINIVIL,ZESTRIL) 20 MG tablet     Patient will have a nuclear thyroid scan.  He was started on lisinopril for his blood pressure.  He was started on Lasix for blood pressure as well as the venous stasis.  Patient will see me back in 3 months with above lab work.  He needs to see the cardiologist next week.    This note is generated with speech recognition software and is subject to transcription error and sound alike phrases that may be missed by proofreading.

## 2022-03-16 ENCOUNTER — OFFICE VISIT (OUTPATIENT)
Dept: CARDIOLOGY | Facility: CLINIC | Age: 67
End: 2022-03-16
Payer: MEDICAID

## 2022-03-16 VITALS
DIASTOLIC BLOOD PRESSURE: 88 MMHG | BODY MASS INDEX: 43.21 KG/M2 | SYSTOLIC BLOOD PRESSURE: 138 MMHG | HEIGHT: 71 IN | HEART RATE: 70 BPM | WEIGHT: 308.63 LBS

## 2022-03-16 DIAGNOSIS — I15.2 HYPERTENSION ASSOCIATED WITH DIABETES: Primary | ICD-10-CM

## 2022-03-16 DIAGNOSIS — I48.91 ATRIAL FIBRILLATION, UNSPECIFIED TYPE: ICD-10-CM

## 2022-03-16 DIAGNOSIS — E66.01 MORBID OBESITY WITH BODY MASS INDEX (BMI) OF 40.0 TO 44.9 IN ADULT: ICD-10-CM

## 2022-03-16 DIAGNOSIS — E11.59 HYPERTENSION ASSOCIATED WITH DIABETES: Primary | ICD-10-CM

## 2022-03-16 DIAGNOSIS — E11.9 DIABETES MELLITUS WITH NO COMPLICATION: ICD-10-CM

## 2022-03-16 DIAGNOSIS — I48.0 PAROXYSMAL A-FIB: ICD-10-CM

## 2022-03-16 DIAGNOSIS — I50.9 CHRONIC CONGESTIVE HEART FAILURE, UNSPECIFIED HEART FAILURE TYPE: ICD-10-CM

## 2022-03-16 DIAGNOSIS — I25.10 CORONARY ARTERY DISEASE INVOLVING NATIVE CORONARY ARTERY OF NATIVE HEART WITHOUT ANGINA PECTORIS: ICD-10-CM

## 2022-03-16 PROCEDURE — 3079F PR MOST RECENT DIASTOLIC BLOOD PRESSURE 80-89 MM HG: ICD-10-PCS | Mod: CPTII,,, | Performed by: INTERNAL MEDICINE

## 2022-03-16 PROCEDURE — 3061F PR NEG MICROALBUMINURIA RESULT DOCUMENTED/REVIEW: ICD-10-PCS | Mod: CPTII,,, | Performed by: INTERNAL MEDICINE

## 2022-03-16 PROCEDURE — 3044F PR MOST RECENT HEMOGLOBIN A1C LEVEL <7.0%: ICD-10-PCS | Mod: CPTII,,, | Performed by: INTERNAL MEDICINE

## 2022-03-16 PROCEDURE — 3008F BODY MASS INDEX DOCD: CPT | Mod: CPTII,,, | Performed by: INTERNAL MEDICINE

## 2022-03-16 PROCEDURE — 3008F PR BODY MASS INDEX (BMI) DOCUMENTED: ICD-10-PCS | Mod: CPTII,,, | Performed by: INTERNAL MEDICINE

## 2022-03-16 PROCEDURE — 4010F ACE/ARB THERAPY RXD/TAKEN: CPT | Mod: CPTII,,, | Performed by: INTERNAL MEDICINE

## 2022-03-16 PROCEDURE — 99204 OFFICE O/P NEW MOD 45 MIN: CPT | Mod: S$PBB,,, | Performed by: INTERNAL MEDICINE

## 2022-03-16 PROCEDURE — 3066F NEPHROPATHY DOC TX: CPT | Mod: CPTII,,, | Performed by: INTERNAL MEDICINE

## 2022-03-16 PROCEDURE — 3072F LOW RISK FOR RETINOPATHY: CPT | Mod: CPTII,,, | Performed by: INTERNAL MEDICINE

## 2022-03-16 PROCEDURE — 1159F PR MEDICATION LIST DOCUMENTED IN MEDICAL RECORD: ICD-10-PCS | Mod: CPTII,,, | Performed by: INTERNAL MEDICINE

## 2022-03-16 PROCEDURE — 3061F NEG MICROALBUMINURIA REV: CPT | Mod: CPTII,,, | Performed by: INTERNAL MEDICINE

## 2022-03-16 PROCEDURE — 3072F PR LOW RISK FOR RETINOPATHY: ICD-10-PCS | Mod: CPTII,,, | Performed by: INTERNAL MEDICINE

## 2022-03-16 PROCEDURE — 4010F PR ACE/ARB THEARPY RXD/TAKEN: ICD-10-PCS | Mod: CPTII,,, | Performed by: INTERNAL MEDICINE

## 2022-03-16 PROCEDURE — 99999 PR PBB SHADOW E&M-EST. PATIENT-LVL IV: ICD-10-PCS | Mod: PBBFAC,,, | Performed by: INTERNAL MEDICINE

## 2022-03-16 PROCEDURE — 1160F PR REVIEW ALL MEDS BY PRESCRIBER/CLIN PHARMACIST DOCUMENTED: ICD-10-PCS | Mod: CPTII,,, | Performed by: INTERNAL MEDICINE

## 2022-03-16 PROCEDURE — 3079F DIAST BP 80-89 MM HG: CPT | Mod: CPTII,,, | Performed by: INTERNAL MEDICINE

## 2022-03-16 PROCEDURE — 3075F SYST BP GE 130 - 139MM HG: CPT | Mod: CPTII,,, | Performed by: INTERNAL MEDICINE

## 2022-03-16 PROCEDURE — 1159F MED LIST DOCD IN RCRD: CPT | Mod: CPTII,,, | Performed by: INTERNAL MEDICINE

## 2022-03-16 PROCEDURE — 3075F PR MOST RECENT SYSTOLIC BLOOD PRESS GE 130-139MM HG: ICD-10-PCS | Mod: CPTII,,, | Performed by: INTERNAL MEDICINE

## 2022-03-16 PROCEDURE — 3066F PR DOCUMENTATION OF TREATMENT FOR NEPHROPATHY: ICD-10-PCS | Mod: CPTII,,, | Performed by: INTERNAL MEDICINE

## 2022-03-16 PROCEDURE — 99999 PR PBB SHADOW E&M-EST. PATIENT-LVL IV: CPT | Mod: PBBFAC,,, | Performed by: INTERNAL MEDICINE

## 2022-03-16 PROCEDURE — 99204 PR OFFICE/OUTPT VISIT, NEW, LEVL IV, 45-59 MIN: ICD-10-PCS | Mod: S$PBB,,, | Performed by: INTERNAL MEDICINE

## 2022-03-16 PROCEDURE — 1160F RVW MEDS BY RX/DR IN RCRD: CPT | Mod: CPTII,,, | Performed by: INTERNAL MEDICINE

## 2022-03-16 PROCEDURE — 3044F HG A1C LEVEL LT 7.0%: CPT | Mod: CPTII,,, | Performed by: INTERNAL MEDICINE

## 2022-03-16 PROCEDURE — 99214 OFFICE O/P EST MOD 30 MIN: CPT | Mod: PBBFAC | Performed by: INTERNAL MEDICINE

## 2022-03-16 RX ORDER — METHIMAZOLE 5 MG/1
5 TABLET ORAL DAILY
COMMUNITY
End: 2022-09-08 | Stop reason: SDUPTHER

## 2022-03-16 NOTE — PROGRESS NOTES
Subjective:   Patient ID:  Roland Ng is a 66 y.o. male who presents for follow-up of Congestive Heart Failure (New pt referral for chf)  Pt presents to establish care. Pt with hx of CHF, CAD/MI was seeing [facility].  Currently patient denies CP, angina or anginal equivalent.  Recent echo- nml lv function    Hypertension  This is a chronic problem. The current episode started more than 1 year ago. The problem has been gradually improving since onset. The problem is controlled. Pertinent negatives include no chest pain, palpitations or shortness of breath. Past treatments include ACE inhibitors, diuretics, beta blockers and calcium channel blockers. The current treatment provides moderate improvement. There are no compliance problems.    Congestive Heart Failure  Presents for follow-up visit. Pertinent negatives include no abdominal pain, chest pain, chest pressure, claudication, edema, fatigue, muscle weakness, near-syncope, nocturia, orthopnea, palpitations, paroxysmal nocturnal dyspnea, shortness of breath or unexpected weight change. The symptoms have been stable. Compliance with total regimen is %. Compliance with diet is %. Compliance with exercise is %. Compliance with medications is %.   Atrial Fibrillation  Presents for follow-up visit. Symptoms are negative for bradycardia, chest pain, dizziness, palpitations, shortness of breath, syncope, tachycardia and weakness. The symptoms have been stable. Past medical history includes atrial fibrillation and CHF. There are no medication compliance problems.       Review of Systems   Constitutional: Negative. Negative for fatigue, unexpected weight change and weight gain.   HENT: Negative.    Eyes: Negative.    Cardiovascular: Negative.  Negative for chest pain, claudication, leg swelling, near-syncope, palpitations and syncope.   Respiratory: Negative for shortness of breath.    Endocrine: Negative.    Hematologic/Lymphatic:  Negative.    Skin: Negative.    Musculoskeletal: Negative for muscle weakness.   Gastrointestinal: Negative.  Negative for abdominal pain.   Genitourinary: Negative.  Negative for nocturia.   Neurological: Negative.  Negative for dizziness and weakness.   Psychiatric/Behavioral: Negative.    Allergic/Immunologic: Negative.      Family History   Problem Relation Age of Onset    Hypertension Mother     Uterine cancer Mother     Hypertension Father     Glaucoma Paternal Grandmother     Cataracts Paternal Grandmother      Past Medical History:   Diagnosis Date    CAD (coronary artery disease)     had MI 2014    Chronic congestive heart failure 01/13/2021    Diabetes mellitus with no complication     Hypertension associated with diabetes     Hyperthyroidism 01/13/2021    Morbid obesity with body mass index (BMI) of 40.0 to 44.9 in adult     Paroxysmal A-fib      Social History     Socioeconomic History    Marital status: Unknown   Tobacco Use    Smoking status: Never Smoker    Smokeless tobacco: Never Used   Substance and Sexual Activity    Alcohol use: Not Currently    Drug use: Never    Sexual activity: Yes     Partners: Female     Current Outpatient Medications on File Prior to Visit   Medication Sig Dispense Refill    amLODIPine (NORVASC) 10 MG tablet Take 1 tablet (10 mg total) by mouth once daily. 30 tablet 11    apixaban (ELIQUIS) 5 mg Tab Take 1 tablet (5 mg total) by mouth 2 (two) times daily. 180 tablet 3    cetirizine (ZYRTEC) 10 MG tablet Take 1 tablet (10 mg total) by mouth once daily. 90 tablet 3    furosemide (LASIX) 40 MG tablet Take 1 tablet (40 mg total) by mouth once daily. 90 tablet 3    lisinopriL (PRINIVIL,ZESTRIL) 20 MG tablet Take 1 tablet (20 mg total) by mouth once daily. 90 tablet 3    metFORMIN (GLUCOPHAGE) 500 MG tablet Take 1 tablet (500 mg total) by mouth 2 (two) times daily with meals. 180 tablet 3    methIMAzole (TAPAZOLE) 5 MG Tab Take 5 mg by mouth once daily.       metoprolol succinate (TOPROL-XL) 50 MG 24 hr tablet Take 1 tablet (50 mg total) by mouth once daily. 90 tablet 3     No current facility-administered medications on file prior to visit.     Review of patient's allergies indicates:  No Known Allergies    Objective:     Physical Exam  Vitals and nursing note reviewed.   Constitutional:       Appearance: He is well-developed.   HENT:      Head: Normocephalic and atraumatic.   Eyes:      Conjunctiva/sclera: Conjunctivae normal.      Pupils: Pupils are equal, round, and reactive to light.   Cardiovascular:      Rate and Rhythm: Normal rate and regular rhythm.      Pulses: Intact distal pulses.      Heart sounds: Normal heart sounds.   Pulmonary:      Effort: Pulmonary effort is normal.      Breath sounds: Normal breath sounds.   Abdominal:      General: Bowel sounds are normal.      Palpations: Abdomen is soft.   Musculoskeletal:      Cervical back: Normal range of motion and neck supple.   Skin:     General: Skin is warm and dry.   Neurological:      Mental Status: He is alert and oriented to person, place, and time.         Assessment:     1. Hypertension associated with diabetes    2. Atrial fibrillation, unspecified type    3. Chronic congestive heart failure, unspecified heart failure type    4. Paroxysmal A-fib    5. Coronary artery disease involving native coronary artery of native heart without angina pectoris    6. Diabetes mellitus with no complication    7. Morbid obesity with body mass index (BMI) of 40.0 to 44.9 in adult        Plan:     Hypertension associated with diabetes    Atrial fibrillation, unspecified type  -     Ambulatory referral/consult to Cardiology    Chronic congestive heart failure, unspecified heart failure type  -     Ambulatory referral/consult to Cardiology    Paroxysmal A-fib    Coronary artery disease involving native coronary artery of native heart without angina pectoris    Diabetes mellitus with no complication    Morbid obesity  with body mass index (BMI) of 40.0 to 44.9 in adult      Continue lisinopril, metoprolol, norvasc, diuretics- HTN/CHF  Continue eliquis- PAF  Continue statin-hlp

## 2022-03-22 ENCOUNTER — PATIENT MESSAGE (OUTPATIENT)
Dept: ADMINISTRATIVE | Facility: HOSPITAL | Age: 67
End: 2022-03-22
Payer: MEDICAID

## 2022-08-31 DIAGNOSIS — S06.9X0D TRAUMATIC BRAIN INJURY, WITHOUT LOSS OF CONSCIOUSNESS, SUBSEQUENT ENCOUNTER: Primary | ICD-10-CM

## 2022-08-31 RX ORDER — METFORMIN HYDROCHLORIDE 500 MG/1
500 TABLET ORAL 2 TIMES DAILY WITH MEALS
Qty: 180 TABLET | Refills: 3 | OUTPATIENT
Start: 2022-08-31

## 2022-08-31 RX ORDER — METOPROLOL SUCCINATE 50 MG/1
50 TABLET, EXTENDED RELEASE ORAL DAILY
Qty: 90 TABLET | Refills: 3 | OUTPATIENT
Start: 2022-08-31

## 2022-08-31 RX ORDER — AMLODIPINE BESYLATE 10 MG/1
10 TABLET ORAL DAILY
Qty: 30 TABLET | Refills: 11 | OUTPATIENT
Start: 2022-08-31 | End: 2023-08-31

## 2022-08-31 RX ORDER — CETIRIZINE HYDROCHLORIDE 10 MG/1
10 TABLET ORAL DAILY
Qty: 90 TABLET | Refills: 3 | OUTPATIENT
Start: 2022-08-31

## 2022-08-31 NOTE — TELEPHONE ENCOUNTER
Care Due:                  Date            Visit Type   Department     Provider  --------------------------------------------------------------------------------                                EP -                              PRIMARY      [facility] INTERNAL  Last Visit: 03-      CARE ([facility])   FOSTER Rosa                              [facility]                              PRIMARY      [facility] INTERNAL  Next Visit: 09-      CARE ([facility])   FOSTER Rosa                                                            Last  Test          Frequency    Reason                     Performed    Due Date  --------------------------------------------------------------------------------    HBA1C.......  6 months...  metFORMIN................  03- 08-    [facility] Embedded Care Gaps. Reference number: [id number]. 8/31/2022   8:57:50 AM CDT

## 2022-08-31 NOTE — TELEPHONE ENCOUNTER
No new care gaps identified.  [facility] Embedded Care Gaps. Reference number: [id number]. 8/31/2022   8:58:13 AM MIGUEL ANGELT

## 2022-09-01 ENCOUNTER — LAB VISIT (OUTPATIENT)
Dept: LAB | Facility: HOSPITAL | Age: 67
End: 2022-09-01
Attending: INTERNAL MEDICINE
Payer: MEDICARE

## 2022-09-01 ENCOUNTER — OFFICE VISIT (OUTPATIENT)
Dept: INTERNAL MEDICINE | Facility: CLINIC | Age: 67
End: 2022-09-01
Payer: MEDICARE

## 2022-09-01 VITALS
HEIGHT: 71 IN | SYSTOLIC BLOOD PRESSURE: 130 MMHG | BODY MASS INDEX: 44.1 KG/M2 | DIASTOLIC BLOOD PRESSURE: 80 MMHG | OXYGEN SATURATION: 97 % | WEIGHT: 315 LBS | HEART RATE: 83 BPM

## 2022-09-01 DIAGNOSIS — E11.59 HYPERTENSION ASSOCIATED WITH DIABETES: Primary | ICD-10-CM

## 2022-09-01 DIAGNOSIS — E05.90 HYPERTHYROIDISM: ICD-10-CM

## 2022-09-01 DIAGNOSIS — I15.2 HYPERTENSION ASSOCIATED WITH DIABETES: Primary | ICD-10-CM

## 2022-09-01 DIAGNOSIS — E11.9 DIABETES MELLITUS WITH NO COMPLICATION: ICD-10-CM

## 2022-09-01 DIAGNOSIS — I50.9 CHRONIC CONGESTIVE HEART FAILURE, UNSPECIFIED HEART FAILURE TYPE: ICD-10-CM

## 2022-09-01 DIAGNOSIS — I48.0 PAROXYSMAL A-FIB: ICD-10-CM

## 2022-09-01 DIAGNOSIS — I25.10 CORONARY ARTERY DISEASE INVOLVING NATIVE CORONARY ARTERY OF NATIVE HEART WITHOUT ANGINA PECTORIS: ICD-10-CM

## 2022-09-01 DIAGNOSIS — E66.01 MORBID OBESITY WITH BODY MASS INDEX (BMI) OF 40.0 TO 44.9 IN ADULT: ICD-10-CM

## 2022-09-01 LAB
ANION GAP SERPL CALC-SCNC: 8 MMOL/L (ref 8–16)
BUN SERPL-MCNC: 11 MG/DL (ref 8–23)
CALCIUM SERPL-MCNC: 9 MG/DL (ref 8.7–10.5)
CHLORIDE SERPL-SCNC: 108 MMOL/L (ref 95–110)
CO2 SERPL-SCNC: 24 MMOL/L (ref 23–29)
CREAT SERPL-MCNC: 1 MG/DL (ref 0.5–1.4)
EST. GFR  (NO RACE VARIABLE): >60 ML/MIN/1.73 M^2
ESTIMATED AVG GLUCOSE: 123 MG/DL (ref 68–131)
GLUCOSE SERPL-MCNC: 114 MG/DL (ref 70–110)
HBA1C MFR BLD: 5.9 % (ref 4–5.6)
POTASSIUM SERPL-SCNC: 4 MMOL/L (ref 3.5–5.1)
SODIUM SERPL-SCNC: 140 MMOL/L (ref 136–145)
T4 FREE SERPL-MCNC: 0.99 NG/DL (ref 0.71–1.51)
TSH SERPL DL<=0.005 MIU/L-ACNC: <0.01 UIU/ML (ref 0.4–4)

## 2022-09-01 PROCEDURE — 36415 COLL VENOUS BLD VENIPUNCTURE: CPT | Mod: PO | Performed by: INTERNAL MEDICINE

## 2022-09-01 PROCEDURE — 84439 ASSAY OF FREE THYROXINE: CPT | Performed by: INTERNAL MEDICINE

## 2022-09-01 PROCEDURE — 84443 ASSAY THYROID STIM HORMONE: CPT | Performed by: INTERNAL MEDICINE

## 2022-09-01 PROCEDURE — 99999 PR PBB SHADOW E&M-EST. PATIENT-LVL IV: CPT | Mod: PBBFAC,,, | Performed by: INTERNAL MEDICINE

## 2022-09-01 PROCEDURE — 99214 OFFICE O/P EST MOD 30 MIN: CPT | Mod: PBBFAC,PO | Performed by: INTERNAL MEDICINE

## 2022-09-01 PROCEDURE — 99999 PR PBB SHADOW E&M-EST. PATIENT-LVL IV: ICD-10-PCS | Mod: PBBFAC,,, | Performed by: INTERNAL MEDICINE

## 2022-09-01 PROCEDURE — 83036 HEMOGLOBIN GLYCOSYLATED A1C: CPT | Performed by: INTERNAL MEDICINE

## 2022-09-01 PROCEDURE — 99214 PR OFFICE/OUTPT VISIT, EST, LEVL IV, 30-39 MIN: ICD-10-PCS | Mod: S$PBB,,, | Performed by: INTERNAL MEDICINE

## 2022-09-01 PROCEDURE — 80048 BASIC METABOLIC PNL TOTAL CA: CPT | Performed by: INTERNAL MEDICINE

## 2022-09-01 PROCEDURE — 99214 OFFICE O/P EST MOD 30 MIN: CPT | Mod: S$PBB,,, | Performed by: INTERNAL MEDICINE

## 2022-09-01 NOTE — PROGRESS NOTES
"HPI:  Patient is a 67-year-old man who comes today for follow-up of his diabetes, hypertension, coronary disease and congestive heart failure.  He denies any chest pain or shortness of breath.  He does have easily fatigability his well as dyspnea with exertion.  He states his blood pressure is doing well at home.  He denies any other new complaints or problems    Current meds have been verified and updated per the EMR  Exam:/80   Pulse 83   Ht 5' 11" (1.803 m)   Wt (!) 144.2 kg (317 lb 14.5 oz)   SpO2 97%   BMI 44.34 kg/m²   Carotids 2+ equal without bruits  Chest clear  Cardiovascular regular rate and rhythm without any significant murmur gallop or rub    Lab Results   Component Value Date    WBC 9.98 01/13/2021    HGB 15.7 01/13/2021    HCT 51.7 01/13/2021     01/13/2021    CHOL 141 02/28/2022    TRIG 90 02/28/2022    HDL 45 02/28/2022    ALT 11 02/28/2022    AST 9 (L) 02/28/2022     02/28/2022    K 3.9 02/28/2022     02/28/2022    CREATININE 0.8 02/28/2022    BUN 9 02/28/2022    CO2 24 02/28/2022    TSH <0.010 (L) 03/04/2022    PSA 5.3 (H) 01/13/2021    HGBA1C 5.9 (H) 02/28/2022       Impression:  Multiple medical problems below, stable  Patient Active Problem List   Diagnosis    Edema    Chronic allergic rhinitis    Hyperthyroidism    Chronic congestive heart failure    Diabetes mellitus with no complication    Hypertension associated with diabetes    Morbid obesity with body mass index (BMI) of 40.0 to 44.9 in adult    CAD (coronary artery disease)    Paroxysmal A-fib       Plan:  Orders Placed This Encounter    Hemoglobin A1C    Comprehensive Metabolic Panel    Lipid Panel    TSH    CBC Auto Differential    Microalbumin/Creatinine Ratio, Urine    Ambulatory referral/consult to Optometry     Patient is due for his eye exam.  He will be seen again in 6 months with above lab work.  Medications remain the same    This note is generated with speech recognition software and is subject " to transcription error and sound alike phrases that may be missed by proofreading.

## 2022-09-02 ENCOUNTER — PATIENT MESSAGE (OUTPATIENT)
Dept: INTERNAL MEDICINE | Facility: CLINIC | Age: 67
End: 2022-09-02
Payer: MEDICARE

## 2022-09-08 ENCOUNTER — TELEPHONE (OUTPATIENT)
Dept: INTERNAL MEDICINE | Facility: CLINIC | Age: 67
End: 2022-09-08
Payer: MEDICARE

## 2022-09-08 RX ORDER — CETIRIZINE HYDROCHLORIDE 10 MG/1
10 TABLET ORAL DAILY
Qty: 90 TABLET | Refills: 3 | Status: SHIPPED | OUTPATIENT
Start: 2022-09-08 | End: 2023-11-09 | Stop reason: SDUPTHER

## 2022-09-08 RX ORDER — METHIMAZOLE 5 MG/1
5 TABLET ORAL DAILY
Qty: 90 TABLET | Refills: 3 | Status: SHIPPED | OUTPATIENT
Start: 2022-09-08 | End: 2023-11-09 | Stop reason: SDUPTHER

## 2022-09-08 RX ORDER — METOPROLOL SUCCINATE 50 MG/1
50 TABLET, EXTENDED RELEASE ORAL DAILY
Qty: 90 TABLET | Refills: 3 | Status: SHIPPED | OUTPATIENT
Start: 2022-09-08 | End: 2023-11-09 | Stop reason: SDUPTHER

## 2022-09-08 RX ORDER — METFORMIN HYDROCHLORIDE 500 MG/1
500 TABLET ORAL 2 TIMES DAILY WITH MEALS
Qty: 180 TABLET | Refills: 3 | Status: SHIPPED | OUTPATIENT
Start: 2022-09-08 | End: 2023-11-09 | Stop reason: SDUPTHER

## 2022-09-08 RX ORDER — FUROSEMIDE 40 MG/1
40 TABLET ORAL DAILY
Qty: 90 TABLET | Refills: 3 | Status: SHIPPED | OUTPATIENT
Start: 2022-09-08 | End: 2023-11-09 | Stop reason: SDUPTHER

## 2022-09-08 RX ORDER — AMLODIPINE BESYLATE 10 MG/1
10 TABLET ORAL DAILY
Qty: 30 TABLET | Refills: 11 | Status: SHIPPED | OUTPATIENT
Start: 2022-09-08 | End: 2023-11-09 | Stop reason: SDUPTHER

## 2022-09-08 RX ORDER — LISINOPRIL 20 MG/1
20 TABLET ORAL DAILY
Qty: 90 TABLET | Refills: 3 | Status: SHIPPED | OUTPATIENT
Start: 2022-09-08 | End: 2023-11-09 | Stop reason: SDUPTHER

## 2022-09-08 NOTE — TELEPHONE ENCOUNTER
No new care gaps identified.  [facility] Embedded Care Gaps. Reference number: [id number]. 9/08/2022   4:01:24 PM CDT

## 2022-09-08 NOTE — TELEPHONE ENCOUNTER
Called patient educated Your lab work was all in acceptable ranges. Stay on your current meds patient stated okay

## 2022-09-16 ENCOUNTER — OFFICE VISIT (OUTPATIENT)
Dept: CARDIOLOGY | Facility: CLINIC | Age: 67
End: 2022-09-16
Payer: MEDICARE

## 2022-09-16 VITALS
OXYGEN SATURATION: 98 % | DIASTOLIC BLOOD PRESSURE: 78 MMHG | WEIGHT: 315 LBS | SYSTOLIC BLOOD PRESSURE: 138 MMHG | HEART RATE: 91 BPM | BODY MASS INDEX: 44.83 KG/M2

## 2022-09-16 DIAGNOSIS — I25.10 CORONARY ARTERY DISEASE INVOLVING NATIVE CORONARY ARTERY OF NATIVE HEART WITHOUT ANGINA PECTORIS: ICD-10-CM

## 2022-09-16 DIAGNOSIS — R60.9 EDEMA, UNSPECIFIED TYPE: ICD-10-CM

## 2022-09-16 DIAGNOSIS — E11.59 HYPERTENSION ASSOCIATED WITH DIABETES: ICD-10-CM

## 2022-09-16 DIAGNOSIS — I48.0 PAROXYSMAL A-FIB: ICD-10-CM

## 2022-09-16 DIAGNOSIS — I50.9 CHRONIC CONGESTIVE HEART FAILURE, UNSPECIFIED HEART FAILURE TYPE: Primary | ICD-10-CM

## 2022-09-16 DIAGNOSIS — I15.2 HYPERTENSION ASSOCIATED WITH DIABETES: ICD-10-CM

## 2022-09-16 PROCEDURE — 99214 OFFICE O/P EST MOD 30 MIN: CPT | Mod: S$PBB,,, | Performed by: INTERNAL MEDICINE

## 2022-09-16 PROCEDURE — 99999 PR PBB SHADOW E&M-EST. PATIENT-LVL III: CPT | Mod: PBBFAC,,, | Performed by: INTERNAL MEDICINE

## 2022-09-16 PROCEDURE — 99213 OFFICE O/P EST LOW 20 MIN: CPT | Mod: PBBFAC | Performed by: INTERNAL MEDICINE

## 2022-09-16 PROCEDURE — 99999 PR PBB SHADOW E&M-EST. PATIENT-LVL III: ICD-10-PCS | Mod: PBBFAC,,, | Performed by: INTERNAL MEDICINE

## 2022-09-16 PROCEDURE — 99214 PR OFFICE/OUTPT VISIT, EST, LEVL IV, 30-39 MIN: ICD-10-PCS | Mod: S$PBB,,, | Performed by: INTERNAL MEDICINE

## 2022-09-16 NOTE — PROGRESS NOTES
Subjective:   Patient ID:  Roland Ng is a 67 y.o. male who presents for follow-up of No chief complaint on file.  Pt with hx of CHF, CAD/MI was seeing [facility].  Currently patient denies CP, angina or anginal equivalent.  Recent echo- nml lv function  Lipids are at goal. BP at home ok states pt    Hypertension  This is a chronic problem. The current episode started more than 1 year ago. The problem has been gradually improving since onset. The problem is controlled. Pertinent negatives include no chest pain, palpitations or shortness of breath. Past treatments include ACE inhibitors, diuretics, beta blockers and calcium channel blockers. The current treatment provides moderate improvement. There are no compliance problems.    Congestive Heart Failure  Presents for follow-up visit. Pertinent negatives include no abdominal pain, chest pain, chest pressure, claudication, edema, fatigue, muscle weakness, near-syncope, nocturia, palpitations, paroxysmal nocturnal dyspnea, shortness of breath or unexpected weight change. The symptoms have been stable. Compliance with total regimen is %. Compliance with diet is %. Compliance with exercise is %. Compliance with medications is %.   Atrial Fibrillation  Presents for follow-up visit. Symptoms are negative for bradycardia, chest pain, dizziness, palpitations, shortness of breath, syncope, tachycardia and weakness. The symptoms have been stable. There are no medication compliance problems.     Review of Systems   Constitutional: Negative. Negative for fatigue, unexpected weight change and weight gain.   HENT: Negative.     Eyes: Negative.    Cardiovascular: Negative.  Negative for chest pain, claudication, leg swelling, near-syncope, palpitations and syncope.   Respiratory: Negative.  Negative for shortness of breath.    Endocrine: Negative.    Hematologic/Lymphatic: Negative.    Skin: Negative.    Musculoskeletal:  Negative for muscle  weakness.   Gastrointestinal: Negative.  Negative for abdominal pain.   Genitourinary: Negative.  Negative for nocturia.   Neurological: Negative.  Negative for dizziness and weakness.   Psychiatric/Behavioral: Negative.     Allergic/Immunologic: Negative.    All other systems reviewed and are negative.  Family History   Problem Relation Age of Onset    Hypertension Mother     Uterine cancer Mother     Hypertension Father     Glaucoma Paternal Grandmother     Cataracts Paternal Grandmother      Past Medical History:   Diagnosis Date    CAD (coronary artery disease)     had MI 2014    Chronic congestive heart failure 01/13/2021    Diabetes mellitus with no complication     Hypertension associated with diabetes     Hyperthyroidism 01/13/2021    Morbid obesity with body mass index (BMI) of 40.0 to 44.9 in adult     Paroxysmal A-fib      Social History     Socioeconomic History    Marital status: Unknown   Tobacco Use    Smoking status: Never    Smokeless tobacco: Never   Substance and Sexual Activity    Alcohol use: Not Currently    Drug use: Never    Sexual activity: Yes     Partners: Female     Current Outpatient Medications on File Prior to Visit   Medication Sig Dispense Refill    amLODIPine (NORVASC) 10 MG tablet Take 1 tablet (10 mg total) by mouth once daily. 30 tablet 11    apixaban (ELIQUIS) 5 mg Tab Take 1 tablet (5 mg total) by mouth 2 (two) times daily. 180 tablet 3    cetirizine (ZYRTEC) 10 MG tablet Take 1 tablet (10 mg total) by mouth once daily. 90 tablet 3    furosemide (LASIX) 40 MG tablet Take 1 tablet (40 mg total) by mouth once daily. 90 tablet 3    lisinopriL (PRINIVIL,ZESTRIL) 20 MG tablet Take 1 tablet (20 mg total) by mouth once daily. 90 tablet 3    metFORMIN (GLUCOPHAGE) 500 MG tablet Take 1 tablet (500 mg total) by mouth 2 (two) times daily with meals. 180 tablet 3    methIMAzole (TAPAZOLE) 5 MG Tab Take 1 tablet (5 mg total) by mouth once daily. 90 tablet 3    metoprolol succinate  (TOPROL-XL) 50 MG 24 hr tablet Take 1 tablet (50 mg total) by mouth once daily. 90 tablet 3     No current facility-administered medications on file prior to visit.     Review of patient's allergies indicates:  No Known Allergies    Objective:     Physical Exam  Vitals and nursing note reviewed.   Constitutional:       Appearance: He is well-developed.   HENT:      Head: Normocephalic and atraumatic.   Eyes:      Conjunctiva/sclera: Conjunctivae normal.      Pupils: Pupils are equal, round, and reactive to light.   Cardiovascular:      Rate and Rhythm: Normal rate and regular rhythm.      Pulses: Intact distal pulses.      Heart sounds: Normal heart sounds.   Pulmonary:      Effort: Pulmonary effort is normal.      Breath sounds: Normal breath sounds.   Abdominal:      General: Bowel sounds are normal.      Palpations: Abdomen is soft.   Musculoskeletal:      Cervical back: Normal range of motion and neck supple.   Skin:     General: Skin is warm and dry.   Neurological:      Mental Status: He is alert and oriented to person, place, and time.       Assessment:     1. Chronic congestive heart failure, unspecified heart failure type    2. Hypertension associated with diabetes    3. Coronary artery disease involving native coronary artery of native heart without angina pectoris    4. Paroxysmal A-fib    5. Edema, unspecified type        Plan:     Chronic congestive heart failure, unspecified heart failure type    Hypertension associated with diabetes    Coronary artery disease involving native coronary artery of native heart without angina pectoris    Paroxysmal A-fib    Edema, unspecified type      Continue lisinopril, metoprolol, norvasc, diuretics- HTN/CHF  Continue eliquis- PAF  Continue statin-hlp

## 2022-12-08 ENCOUNTER — OFFICE VISIT (OUTPATIENT)
Dept: OPHTHALMOLOGY | Facility: CLINIC | Age: 67
End: 2022-12-08
Payer: MEDICARE

## 2022-12-08 DIAGNOSIS — H40.013 AT LOW RISK FOR OPEN-ANGLE GLAUCOMA IN BOTH EYES: ICD-10-CM

## 2022-12-08 DIAGNOSIS — E11.9 DIABETES MELLITUS TYPE 2 WITHOUT RETINOPATHY: Primary | ICD-10-CM

## 2022-12-08 DIAGNOSIS — E11.36 DIABETIC CATARACT: ICD-10-CM

## 2022-12-08 DIAGNOSIS — H52.7 REFRACTIVE ERRORS: ICD-10-CM

## 2022-12-08 DIAGNOSIS — E11.9 DIABETES MELLITUS WITH NO COMPLICATION: ICD-10-CM

## 2022-12-08 PROCEDURE — 92014 PR EYE EXAM, EST PATIENT,COMPREHESV: ICD-10-PCS | Mod: S$PBB,,, | Performed by: OPTOMETRIST

## 2022-12-08 PROCEDURE — 99212 OFFICE O/P EST SF 10 MIN: CPT | Mod: PBBFAC | Performed by: OPTOMETRIST

## 2022-12-08 PROCEDURE — 92014 COMPRE OPH EXAM EST PT 1/>: CPT | Mod: S$PBB,,, | Performed by: OPTOMETRIST

## 2022-12-08 PROCEDURE — 92015 PR REFRACTION: ICD-10-PCS | Mod: ,,, | Performed by: OPTOMETRIST

## 2022-12-08 PROCEDURE — 99999 PR PBB SHADOW E&M-EST. PATIENT-LVL II: CPT | Mod: PBBFAC,,, | Performed by: OPTOMETRIST

## 2022-12-08 PROCEDURE — 99999 PR PBB SHADOW E&M-EST. PATIENT-LVL II: ICD-10-PCS | Mod: PBBFAC,,, | Performed by: OPTOMETRIST

## 2022-12-08 PROCEDURE — 92015 DETERMINE REFRACTIVE STATE: CPT | Mod: ,,, | Performed by: OPTOMETRIST

## 2022-12-08 NOTE — PROGRESS NOTES
HPI     Diabetic Eye Exam     Additional comments: Yearly DM           Comments    Patient states that BS is stable - 9/2022 HgbA1c was 5.9 - denies va   changes OU - glasses are working well but want updated Mrx   DM x 2019  Graves Disease   Coag Suspect per TRF          Last edited by Peter Cuba, OD on 12/8/2022  2:01 PM.            Assessment /Plan     For exam results, see Encounter Report.    Diabetes mellitus type 2 without retinopathy    Diabetes mellitus with no complication  -     Ambulatory referral/consult to Optometry    At low risk for open-angle glaucoma in both eyes    Diabetic cataract    Refractive errors      No Background Diabetic Retinopathy    Large C/D low IOP, consult for low tension glaucoma eval    Moderate cataracts OU, not surgical    Dispense Final Rx for glasses.  RTC per Dr Pizano  Discussed above and answered questions.

## 2023-03-23 ENCOUNTER — PATIENT MESSAGE (OUTPATIENT)
Dept: ADMINISTRATIVE | Facility: HOSPITAL | Age: 68
End: 2023-03-23

## 2023-04-19 ENCOUNTER — PATIENT MESSAGE (OUTPATIENT)
Dept: ADMINISTRATIVE | Facility: HOSPITAL | Age: 68
End: 2023-04-19
Payer: COMMERCIAL

## 2023-05-01 ENCOUNTER — TELEPHONE (OUTPATIENT)
Dept: INTERNAL MEDICINE | Facility: CLINIC | Age: 68
End: 2023-05-01
Payer: COMMERCIAL

## 2023-05-01 ENCOUNTER — PATIENT MESSAGE (OUTPATIENT)
Dept: INTERNAL MEDICINE | Facility: CLINIC | Age: 68
End: 2023-05-01
Payer: COMMERCIAL

## 2023-05-01 NOTE — TELEPHONE ENCOUNTER
----- Message from Makayla Segura LPN sent at 5/1/2023 10:21 AM CDT -----  Regarding: Care plan  Contact: Debbie/ [employer]  FYI      ----- Message -----  From: Sully Mackay  Sent: 5/1/2023   9:34 AM CDT  To: Shelley SALAZAR Staff    Debbie/ [employer] is calling to speak with the nurse regarding updated Care Plan. Explain will be on patient portal. If have any questions or concerns please give a call back at [phone] or e-mail  [email or web address]  Thanks  LR

## 2023-05-30 ENCOUNTER — PATIENT MESSAGE (OUTPATIENT)
Dept: ADMINISTRATIVE | Facility: HOSPITAL | Age: 68
End: 2023-05-30
Payer: COMMERCIAL

## 2023-08-29 ENCOUNTER — PATIENT MESSAGE (OUTPATIENT)
Dept: ADMINISTRATIVE | Facility: HOSPITAL | Age: 68
End: 2023-08-29
Payer: COMMERCIAL

## 2023-11-09 ENCOUNTER — OFFICE VISIT (OUTPATIENT)
Dept: INTERNAL MEDICINE | Facility: CLINIC | Age: 68
End: 2023-11-09
Payer: COMMERCIAL

## 2023-11-09 ENCOUNTER — LAB VISIT (OUTPATIENT)
Dept: LAB | Facility: HOSPITAL | Age: 68
End: 2023-11-09
Attending: NURSE PRACTITIONER
Payer: COMMERCIAL

## 2023-11-09 VITALS
TEMPERATURE: 98 F | HEART RATE: 82 BPM | HEIGHT: 71 IN | DIASTOLIC BLOOD PRESSURE: 92 MMHG | OXYGEN SATURATION: 99 % | SYSTOLIC BLOOD PRESSURE: 126 MMHG | WEIGHT: 315 LBS | RESPIRATION RATE: 18 BRPM | BODY MASS INDEX: 44.1 KG/M2

## 2023-11-09 DIAGNOSIS — E11.9 DIABETES MELLITUS WITH NO COMPLICATION: ICD-10-CM

## 2023-11-09 DIAGNOSIS — I15.2 HYPERTENSION ASSOCIATED WITH DIABETES: ICD-10-CM

## 2023-11-09 DIAGNOSIS — Z00.00 ENCOUNTER FOR MEDICAL EXAMINATION TO ESTABLISH CARE: ICD-10-CM

## 2023-11-09 DIAGNOSIS — E11.59 HYPERTENSION ASSOCIATED WITH DIABETES: ICD-10-CM

## 2023-11-09 DIAGNOSIS — I50.9 CHRONIC CONGESTIVE HEART FAILURE, UNSPECIFIED HEART FAILURE TYPE: ICD-10-CM

## 2023-11-09 DIAGNOSIS — E66.01 MORBID OBESITY WITH BODY MASS INDEX (BMI) OF 40.0 TO 44.9 IN ADULT: ICD-10-CM

## 2023-11-09 DIAGNOSIS — I25.10 ATHEROSCLEROSIS OF NATIVE CORONARY ARTERY OF NATIVE HEART WITHOUT ANGINA PECTORIS: ICD-10-CM

## 2023-11-09 DIAGNOSIS — Z12.11 COLON CANCER SCREENING: ICD-10-CM

## 2023-11-09 DIAGNOSIS — I25.2 HISTORY OF MI (MYOCARDIAL INFARCTION): ICD-10-CM

## 2023-11-09 DIAGNOSIS — Z09 HOSPITAL DISCHARGE FOLLOW-UP: Primary | ICD-10-CM

## 2023-11-09 PROCEDURE — 1126F PR PAIN SEVERITY QUANTIFIED, NO PAIN PRESENT: ICD-10-PCS | Mod: CPTII,S$GLB,, | Performed by: NURSE PRACTITIONER

## 2023-11-09 PROCEDURE — 1159F PR MEDICATION LIST DOCUMENTED IN MEDICAL RECORD: ICD-10-PCS | Mod: CPTII,S$GLB,, | Performed by: NURSE PRACTITIONER

## 2023-11-09 PROCEDURE — 1101F PT FALLS ASSESS-DOCD LE1/YR: CPT | Mod: CPTII,S$GLB,, | Performed by: NURSE PRACTITIONER

## 2023-11-09 PROCEDURE — 99999 PR PBB SHADOW E&M-EST. PATIENT-LVL IV: ICD-10-PCS | Mod: PBBFAC,,, | Performed by: NURSE PRACTITIONER

## 2023-11-09 PROCEDURE — 99999 PR PBB SHADOW E&M-EST. PATIENT-LVL IV: CPT | Mod: PBBFAC,,, | Performed by: NURSE PRACTITIONER

## 2023-11-09 PROCEDURE — 99214 OFFICE O/P EST MOD 30 MIN: CPT | Mod: S$GLB,,, | Performed by: NURSE PRACTITIONER

## 2023-11-09 PROCEDURE — 1159F MED LIST DOCD IN RCRD: CPT | Mod: CPTII,S$GLB,, | Performed by: NURSE PRACTITIONER

## 2023-11-09 PROCEDURE — 3008F PR BODY MASS INDEX (BMI) DOCUMENTED: ICD-10-PCS | Mod: CPTII,S$GLB,, | Performed by: NURSE PRACTITIONER

## 2023-11-09 PROCEDURE — 99214 PR OFFICE/OUTPT VISIT, EST, LEVL IV, 30-39 MIN: ICD-10-PCS | Mod: S$GLB,,, | Performed by: NURSE PRACTITIONER

## 2023-11-09 PROCEDURE — 1160F PR REVIEW ALL MEDS BY PRESCRIBER/CLIN PHARMACIST DOCUMENTED: ICD-10-PCS | Mod: CPTII,S$GLB,, | Performed by: NURSE PRACTITIONER

## 2023-11-09 PROCEDURE — 3080F DIAST BP >= 90 MM HG: CPT | Mod: CPTII,S$GLB,, | Performed by: NURSE PRACTITIONER

## 2023-11-09 PROCEDURE — 3008F BODY MASS INDEX DOCD: CPT | Mod: CPTII,S$GLB,, | Performed by: NURSE PRACTITIONER

## 2023-11-09 PROCEDURE — 3044F HG A1C LEVEL LT 7.0%: CPT | Mod: CPTII,S$GLB,, | Performed by: NURSE PRACTITIONER

## 2023-11-09 PROCEDURE — 3288F PR FALLS RISK ASSESSMENT DOCUMENTED: ICD-10-PCS | Mod: CPTII,S$GLB,, | Performed by: NURSE PRACTITIONER

## 2023-11-09 PROCEDURE — 4010F PR ACE/ARB THEARPY RXD/TAKEN: ICD-10-PCS | Mod: CPTII,S$GLB,, | Performed by: NURSE PRACTITIONER

## 2023-11-09 PROCEDURE — 3074F SYST BP LT 130 MM HG: CPT | Mod: CPTII,S$GLB,, | Performed by: NURSE PRACTITIONER

## 2023-11-09 PROCEDURE — 4010F ACE/ARB THERAPY RXD/TAKEN: CPT | Mod: CPTII,S$GLB,, | Performed by: NURSE PRACTITIONER

## 2023-11-09 PROCEDURE — 3074F PR MOST RECENT SYSTOLIC BLOOD PRESSURE < 130 MM HG: ICD-10-PCS | Mod: CPTII,S$GLB,, | Performed by: NURSE PRACTITIONER

## 2023-11-09 PROCEDURE — 1101F PR PT FALLS ASSESS DOC 0-1 FALLS W/OUT INJ PAST YR: ICD-10-PCS | Mod: CPTII,S$GLB,, | Performed by: NURSE PRACTITIONER

## 2023-11-09 PROCEDURE — 1126F AMNT PAIN NOTED NONE PRSNT: CPT | Mod: CPTII,S$GLB,, | Performed by: NURSE PRACTITIONER

## 2023-11-09 PROCEDURE — 1160F RVW MEDS BY RX/DR IN RCRD: CPT | Mod: CPTII,S$GLB,, | Performed by: NURSE PRACTITIONER

## 2023-11-09 PROCEDURE — 3080F PR MOST RECENT DIASTOLIC BLOOD PRESSURE >= 90 MM HG: ICD-10-PCS | Mod: CPTII,S$GLB,, | Performed by: NURSE PRACTITIONER

## 2023-11-09 PROCEDURE — 3044F PR MOST RECENT HEMOGLOBIN A1C LEVEL <7.0%: ICD-10-PCS | Mod: CPTII,S$GLB,, | Performed by: NURSE PRACTITIONER

## 2023-11-09 PROCEDURE — 3288F FALL RISK ASSESSMENT DOCD: CPT | Mod: CPTII,S$GLB,, | Performed by: NURSE PRACTITIONER

## 2023-11-09 RX ORDER — METHIMAZOLE 5 MG/1
5 TABLET ORAL DAILY
Qty: 90 TABLET | Refills: 3 | Status: SHIPPED | OUTPATIENT
Start: 2023-11-09

## 2023-11-09 RX ORDER — CETIRIZINE HYDROCHLORIDE 10 MG/1
10 TABLET ORAL DAILY
Qty: 90 TABLET | Refills: 3 | Status: SHIPPED | OUTPATIENT
Start: 2023-11-09

## 2023-11-09 RX ORDER — FUROSEMIDE 40 MG/1
40 TABLET ORAL DAILY
Qty: 90 TABLET | Refills: 3 | Status: SHIPPED | OUTPATIENT
Start: 2023-11-09 | End: 2024-11-08

## 2023-11-09 RX ORDER — METOPROLOL SUCCINATE 50 MG/1
50 TABLET, EXTENDED RELEASE ORAL DAILY
Qty: 90 TABLET | Refills: 3 | Status: ON HOLD | OUTPATIENT
Start: 2023-11-09 | End: 2023-12-19 | Stop reason: HOSPADM

## 2023-11-09 RX ORDER — METFORMIN HYDROCHLORIDE 500 MG/1
500 TABLET ORAL 2 TIMES DAILY WITH MEALS
Qty: 180 TABLET | Refills: 3 | Status: ON HOLD | OUTPATIENT
Start: 2023-11-09 | End: 2023-12-19 | Stop reason: HOSPADM

## 2023-11-09 RX ORDER — LISINOPRIL 20 MG/1
20 TABLET ORAL DAILY
Qty: 90 TABLET | Refills: 3 | Status: ON HOLD | OUTPATIENT
Start: 2023-11-09 | End: 2023-12-19 | Stop reason: HOSPADM

## 2023-11-09 RX ORDER — AMLODIPINE BESYLATE 10 MG/1
10 TABLET ORAL DAILY
Qty: 90 TABLET | Refills: 3 | Status: ON HOLD | OUTPATIENT
Start: 2023-11-09 | End: 2023-12-19 | Stop reason: HOSPADM

## 2023-11-09 NOTE — PROGRESS NOTES
Subjective     Patient ID: Roland Ng is a 68 y.o. male.    Chief Complaint: Follow-up (HOSP) and Insomnia    Patient presents for hospital follow up and establish care.  Went to [facility] on 11/01/2023 for SOB. Reports having work up and was told of fluid overload.  Received IV medications but no new prescriptions.      Last visit with cardiology Dr. Decker 09/2022.      Medical history: Edema, Hyperthyroidism, CHF, HTN, Obesity, Atherosclerosis of native Coronary artery of native heart without angina pectoris, A-fib, MI    Follow-up  Associated symptoms include arthralgias and joint swelling. Pertinent negatives include no abdominal pain, chest pain, chills, coughing, diaphoresis or fatigue.     Review of Systems   Constitutional:  Negative for chills, diaphoresis and fatigue.   Respiratory:  Negative for cough and shortness of breath.    Cardiovascular:  Positive for leg swelling. Negative for chest pain.   Gastrointestinal:  Negative for abdominal pain, constipation and diarrhea.   Musculoskeletal:  Positive for arthralgias, gait problem and joint swelling.   Psychiatric/Behavioral:  Negative for agitation and confusion.           Objective     Physical Exam  Vitals reviewed.   Constitutional:       Appearance: He is obese.   HENT:      Head: Normocephalic.      Right Ear: Tympanic membrane normal.      Left Ear: Tympanic membrane normal.   Cardiovascular:      Rate and Rhythm: Normal rate. Rhythm irregular.      Heart sounds: Murmur heard.   Pulmonary:      Effort: Pulmonary effort is normal.      Breath sounds: Normal breath sounds.   Abdominal:      General: Bowel sounds are normal. There is distension.   Musculoskeletal:         General: Swelling present.      Right lower leg: 3+ Edema present.      Left lower leg: 3+ Edema present.   Neurological:      General: No focal deficit present.      Mental Status: He is alert.   Psychiatric:         Mood and Affect: Mood normal.          Behavior: Behavior is cooperative.            Assessment and Plan     1. Hospital discharge follow-up    2. Encounter for medical examination to establish care    3. Chronic congestive heart failure, unspecified heart failure type  Comments:  Labs today.  Low/no sodium diet.  Weight daily.  Orders:  -     furosemide (LASIX) 40 MG tablet; Take 1 tablet (40 mg total) by mouth once daily.  Dispense: 90 tablet; Refill: 3  -     methIMAzole (TAPAZOLE) 5 MG Tab; Take 1 tablet (5 mg total) by mouth once daily.  Dispense: 90 tablet; Refill: 3  -     metoprolol succinate (TOPROL-XL) 50 MG 24 hr tablet; Take 1 tablet (50 mg total) by mouth once daily.  Dispense: 90 tablet; Refill: 3  -     B-TYPE NATRIURETIC PEPTIDE; Future; Expected date: 11/09/2023    4. Hypertension associated with diabetes  Comments:  Labs today. Low/no sodium diet. Weight daily.  Orders:  -     amLODIPine (NORVASC) 10 MG tablet; Take 1 tablet (10 mg total) by mouth once daily.  Dispense: 90 tablet; Refill: 3  -     lisinopriL (PRINIVIL,ZESTRIL) 20 MG tablet; Take 1 tablet (20 mg total) by mouth once daily.  Dispense: 90 tablet; Refill: 3  -     TSH; Future; Expected date: 11/09/2023  -     T3; Future; Expected date: 11/09/2023  -     T4, FREE; Future; Expected date: 11/09/2023  -     Foot Exam Performed    5. Morbid obesity with body mass index (BMI) of 40.0 to 44.9 in adult    6. Diabetes mellitus with no complication  Comments:  Labs today. Low/no sodium/diabetic diet. Weight daily.  Orders:  -     HEMOGLOBIN A1C; Future; Expected date: 11/09/2023  -     CBC Auto Differential; Future; Expected date: 11/09/2023  -     LIPID PANEL; Future; Expected date: 11/09/2023  -     Microalbumin/Creatinine Ratio, Urine; Future; Expected date: 11/09/2023  -     Foot Exam Performed    7. History of MI (myocardial infarction)  Overview:  2016       8. Atherosclerosis of native coronary artery of native heart without angina pectoris  Comments:  Labs today.  Continue  Eliquis.  Overview:  had MI 2014    Orders:  -     apixaban (ELIQUIS) 5 mg Tab; Take 1 tablet (5 mg total) by mouth 2 (two) times daily.  Dispense: 180 tablet; Refill: 3  -     LIPID PANEL; Future; Expected date: 11/09/2023    9. Colon cancer screening  -     Cologuard Screening (Multitarget Stool DNA); Future; Expected date: 11/09/2023    Other orders  -     cetirizine (ZYRTEC) 10 MG tablet; Take 1 tablet (10 mg total) by mouth once daily.  Dispense: 90 tablet; Refill: 3  -     metFORMIN (GLUCOPHAGE) 500 MG tablet; Take 1 tablet (500 mg total) by mouth 2 (two) times daily with meals.  Dispense: 180 tablet; Refill: 3        [place]-Jamil Fay (recent ER visit and labs/imaging     Due to see cardiology --Jeronimo (Brooke)    Schedule eye exam same day-Bereket     3 month follow up         Follow up in about 3 months (around 2/9/2024).

## 2023-11-22 DIAGNOSIS — I15.2 HYPERTENSION ASSOCIATED WITH DIABETES: ICD-10-CM

## 2023-11-22 DIAGNOSIS — E11.59 HYPERTENSION ASSOCIATED WITH DIABETES: ICD-10-CM

## 2023-12-09 ENCOUNTER — HOSPITAL ENCOUNTER (INPATIENT)
Facility: HOSPITAL | Age: 68
LOS: 9 days | Discharge: HOME HEALTH | DRG: 291 | End: 2023-12-19
Attending: FAMILY MEDICINE | Admitting: HOSPITALIST
Payer: COMMERCIAL

## 2023-12-09 DIAGNOSIS — I48.3 TYPICAL ATRIAL FLUTTER: ICD-10-CM

## 2023-12-09 DIAGNOSIS — N50.89 SCROTAL EDEMA: ICD-10-CM

## 2023-12-09 DIAGNOSIS — R06.82 TACHYPNEA: ICD-10-CM

## 2023-12-09 DIAGNOSIS — E66.01 MORBID OBESITY WITH BODY MASS INDEX (BMI) OF 40.0 TO 44.9 IN ADULT: ICD-10-CM

## 2023-12-09 DIAGNOSIS — R06.02 SOB (SHORTNESS OF BREATH): ICD-10-CM

## 2023-12-09 DIAGNOSIS — I48.92 ATRIAL FLUTTER, UNSPECIFIED TYPE: Primary | ICD-10-CM

## 2023-12-09 DIAGNOSIS — I10 PRIMARY HYPERTENSION: ICD-10-CM

## 2023-12-09 DIAGNOSIS — I50.9 CHF EXACERBATION: ICD-10-CM

## 2023-12-09 DIAGNOSIS — N17.9 AKI (ACUTE KIDNEY INJURY): ICD-10-CM

## 2023-12-09 DIAGNOSIS — I48.4 ATYPICAL ATRIAL FLUTTER: ICD-10-CM

## 2023-12-09 DIAGNOSIS — I50.43 ACUTE ON CHRONIC COMBINED SYSTOLIC AND DIASTOLIC CONGESTIVE HEART FAILURE: ICD-10-CM

## 2023-12-09 DIAGNOSIS — I50.20 SYSTOLIC CONGESTIVE HEART FAILURE, UNSPECIFIED HF CHRONICITY: ICD-10-CM

## 2023-12-09 DIAGNOSIS — R60.0 LOWER EXTREMITY EDEMA: ICD-10-CM

## 2023-12-09 DIAGNOSIS — I89.0 LYMPHEDEMA: ICD-10-CM

## 2023-12-09 DIAGNOSIS — I50.23 ACUTE ON CHRONIC SYSTOLIC CONGESTIVE HEART FAILURE: ICD-10-CM

## 2023-12-09 DIAGNOSIS — I48.0 PAROXYSMAL A-FIB: ICD-10-CM

## 2023-12-09 DIAGNOSIS — I48.92 ATRIAL FLUTTER: ICD-10-CM

## 2023-12-09 LAB
ALBUMIN SERPL BCP-MCNC: 3.6 G/DL (ref 3.5–5.2)
ALP SERPL-CCNC: 79 U/L (ref 55–135)
ALT SERPL W/O P-5'-P-CCNC: 102 U/L (ref 10–44)
ANION GAP SERPL CALC-SCNC: 16 MMOL/L (ref 8–16)
AST SERPL-CCNC: 72 U/L (ref 10–40)
BASOPHILS # BLD AUTO: 0.04 K/UL (ref 0–0.2)
BASOPHILS NFR BLD: 0.3 % (ref 0–1.9)
BILIRUB SERPL-MCNC: 1.6 MG/DL (ref 0.1–1)
BILIRUB UR QL STRIP: NEGATIVE
BNP SERPL-MCNC: 164 PG/ML (ref 0–99)
BUN SERPL-MCNC: 23 MG/DL (ref 8–23)
CALCIUM SERPL-MCNC: 9.3 MG/DL (ref 8.7–10.5)
CHLORIDE SERPL-SCNC: 101 MMOL/L (ref 95–110)
CLARITY UR: CLEAR
CO2 SERPL-SCNC: 24 MMOL/L (ref 23–29)
COLOR UR: YELLOW
CREAT SERPL-MCNC: 1.3 MG/DL (ref 0.5–1.4)
DIFFERENTIAL METHOD BLD: ABNORMAL
EOSINOPHIL # BLD AUTO: 0 K/UL (ref 0–0.5)
EOSINOPHIL NFR BLD: 0.2 % (ref 0–8)
ERYTHROCYTE [DISTWIDTH] IN BLOOD BY AUTOMATED COUNT: 14.8 % (ref 11.5–14.5)
EST. GFR  (NO RACE VARIABLE): >60 ML/MIN/1.73 M^2
GLUCOSE SERPL-MCNC: 116 MG/DL (ref 70–110)
GLUCOSE UR QL STRIP: NEGATIVE
HCT VFR BLD AUTO: 44.8 % (ref 40–54)
HGB BLD-MCNC: 15.2 G/DL (ref 14–18)
HGB UR QL STRIP: NEGATIVE
IMM GRANULOCYTES # BLD AUTO: 0.05 K/UL (ref 0–0.04)
IMM GRANULOCYTES NFR BLD AUTO: 0.4 % (ref 0–0.5)
KETONES UR QL STRIP: NEGATIVE
LEUKOCYTE ESTERASE UR QL STRIP: NEGATIVE
LYMPHOCYTES # BLD AUTO: 2.1 K/UL (ref 1–4.8)
LYMPHOCYTES NFR BLD: 15.8 % (ref 18–48)
MCH RBC QN AUTO: 26 PG (ref 27–31)
MCHC RBC AUTO-ENTMCNC: 33.9 G/DL (ref 32–36)
MCV RBC AUTO: 77 FL (ref 82–98)
MONOCYTES # BLD AUTO: 1.2 K/UL (ref 0.3–1)
MONOCYTES NFR BLD: 9.4 % (ref 4–15)
NEUTROPHILS # BLD AUTO: 9.8 K/UL (ref 1.8–7.7)
NEUTROPHILS NFR BLD: 73.9 % (ref 38–73)
NITRITE UR QL STRIP: NEGATIVE
NRBC BLD-RTO: 0 /100 WBC
PH UR STRIP: 7 [PH] (ref 5–8)
PLATELET # BLD AUTO: 217 K/UL (ref 150–450)
PMV BLD AUTO: 11.2 FL (ref 9.2–12.9)
POTASSIUM SERPL-SCNC: 3.4 MMOL/L (ref 3.5–5.1)
PROT SERPL-MCNC: 7.9 G/DL (ref 6–8.4)
PROT UR QL STRIP: NEGATIVE
RBC # BLD AUTO: 5.84 M/UL (ref 4.6–6.2)
SODIUM SERPL-SCNC: 141 MMOL/L (ref 136–145)
SP GR UR STRIP: 1.01 (ref 1–1.03)
TROPONIN I SERPL DL<=0.01 NG/ML-MCNC: 0.03 NG/ML (ref 0–0.03)
URN SPEC COLLECT METH UR: NORMAL
UROBILINOGEN UR STRIP-ACNC: 1 EU/DL
WBC # BLD AUTO: 13.19 K/UL (ref 3.9–12.7)

## 2023-12-09 PROCEDURE — 81003 URINALYSIS AUTO W/O SCOPE: CPT | Performed by: FAMILY MEDICINE

## 2023-12-09 PROCEDURE — 80053 COMPREHEN METABOLIC PANEL: CPT | Performed by: FAMILY MEDICINE

## 2023-12-09 PROCEDURE — 84484 ASSAY OF TROPONIN QUANT: CPT | Performed by: FAMILY MEDICINE

## 2023-12-09 PROCEDURE — 85025 COMPLETE CBC W/AUTO DIFF WBC: CPT | Performed by: FAMILY MEDICINE

## 2023-12-09 PROCEDURE — 93010 ELECTROCARDIOGRAM REPORT: CPT | Mod: ,,, | Performed by: STUDENT IN AN ORGANIZED HEALTH CARE EDUCATION/TRAINING PROGRAM

## 2023-12-09 PROCEDURE — 99291 CRITICAL CARE FIRST HOUR: CPT

## 2023-12-09 PROCEDURE — 83880 ASSAY OF NATRIURETIC PEPTIDE: CPT | Performed by: FAMILY MEDICINE

## 2023-12-09 PROCEDURE — 93005 ELECTROCARDIOGRAM TRACING: CPT

## 2023-12-09 RX ORDER — FUROSEMIDE 10 MG/ML
80 INJECTION INTRAMUSCULAR; INTRAVENOUS
Status: COMPLETED | OUTPATIENT
Start: 2023-12-10 | End: 2023-12-10

## 2023-12-10 PROBLEM — R06.00 DYSPNEA: Status: ACTIVE | Noted: 2023-12-10

## 2023-12-10 PROBLEM — I10 HYPERTENSION: Status: ACTIVE | Noted: 2022-03-04

## 2023-12-10 PROBLEM — N50.89 SCROTAL SWELLING: Status: ACTIVE | Noted: 2023-12-10

## 2023-12-10 PROBLEM — R60.9 EDEMA: Status: RESOLVED | Noted: 2021-01-13 | Resolved: 2023-12-10

## 2023-12-10 PROBLEM — I48.92 ATRIAL FLUTTER: Status: ACTIVE | Noted: 2023-12-10

## 2023-12-10 PROBLEM — R60.0 BILATERAL LOWER EXTREMITY EDEMA: Status: ACTIVE | Noted: 2023-12-10

## 2023-12-10 LAB
AORTIC ROOT ANNULUS: 3.22 CM
APTT PPP: 28.5 SEC (ref 21–32)
ASCENDING AORTA: 3.27 CM
AV INDEX (PROSTH): 0.74
AV MEAN GRADIENT: 3 MMHG
AV PEAK GRADIENT: 4 MMHG
AV VALVE AREA BY VELOCITY RATIO: 2.2 CM²
AV VALVE AREA: 2.5 CM²
AV VELOCITY RATIO: 0.65
BSA FOR ECHO PROCEDURE: 2.62 M2
CV ECHO LV RWT: 0.49 CM
DOP CALC AO PEAK VEL: 1.04 M/S
DOP CALC AO VTI: 17.9 CM
DOP CALC LVOT AREA: 3.4 CM2
DOP CALC LVOT DIAMETER: 2.07 CM
DOP CALC LVOT PEAK VEL: 0.68 M/S
DOP CALC LVOT STROKE VOLUME: 44.74 CM3
DOP CALC RVOT PEAK VEL: 0.56 M/S
DOP CALC RVOT VTI: 9.8 CM
DOP CALCLVOT PEAK VEL VTI: 13.3 CM
E WAVE DECELERATION TIME: 167.06 MSEC
E/A RATIO: 4.7
E/E' RATIO: 11.37 M/S
ECHO LV POSTERIOR WALL: 1.29 CM (ref 0.6–1.1)
EJECTION FRACTION: 40 %
ESTIMATED AVG GLUCOSE: 140 MG/DL (ref 68–131)
FRACTIONAL SHORTENING: 20 % (ref 28–44)
HBA1C MFR BLD: 6.5 % (ref 4–5.6)
INR PPP: 1.4 (ref 0.8–1.2)
INTERVENTRICULAR SEPTUM: 1.28 CM (ref 0.6–1.1)
IVC DIAMETER: 1.93 CM
IVRT: 31.4 MSEC
LA MAJOR: 6.56 CM
LA WIDTH: 4.7 CM
LEFT ATRIUM SIZE: 5.35 CM
LEFT INTERNAL DIMENSION IN SYSTOLE: 4.27 CM (ref 2.1–4)
LEFT VENTRICLE DIASTOLIC VOLUME INDEX: 53.91 ML/M2
LEFT VENTRICLE DIASTOLIC VOLUME: 135.86 ML
LEFT VENTRICLE MASS INDEX: 112 G/M2
LEFT VENTRICLE SYSTOLIC VOLUME INDEX: 32.5 ML/M2
LEFT VENTRICLE SYSTOLIC VOLUME: 81.84 ML
LEFT VENTRICULAR INTERNAL DIMENSION IN DIASTOLE: 5.31 CM (ref 3.5–6)
LEFT VENTRICULAR MASS: 283.13 G
LV LATERAL E/E' RATIO: 9.82 M/S
LV SEPTAL E/E' RATIO: 13.5 M/S
LVOT MG: 0.91 MMHG
LVOT MV: 0.44 CM/S
MAGNESIUM SERPL-MCNC: 1.9 MG/DL (ref 1.6–2.6)
MV PEAK A VEL: 0.23 M/S
MV PEAK E VEL: 1.08 M/S
MV STENOSIS PRESSURE HALF TIME: 48.45 MS
MV VALVE AREA P 1/2 METHOD: 4.54 CM2
PISA MRMAX VEL: 4.52 M/S
PISA TR MAX VEL: 2.78 M/S
POCT GLUCOSE: 107 MG/DL (ref 70–110)
PROTHROMBIN TIME: 14.2 SEC (ref 9–12.5)
PV MEAN GRADIENT: 1 MMHG
RA MAJOR: 5.76 CM
RA PRESSURE ESTIMATED: 8 MMHG
RA WIDTH: 4.4 CM
RV MID DIAMA: 4.02 CM
RV TB RVSP: 11 MMHG
STJ: 3.2 CM
T4 FREE SERPL-MCNC: 1.5 NG/DL (ref 0.71–1.51)
TDI LATERAL: 0.11 M/S
TDI SEPTAL: 0.08 M/S
TDI: 0.1 M/S
TR MAX PG: 31 MMHG
TRICUSPID ANNULAR PLANE SYSTOLIC EXCURSION: 1.7 CM
TSH SERPL DL<=0.005 MIU/L-ACNC: 0.08 UIU/ML (ref 0.4–4)
TV REST PULMONARY ARTERY PRESSURE: 39 MMHG
Z-SCORE OF LEFT VENTRICULAR DIMENSION IN END DIASTOLE: -10.17
Z-SCORE OF LEFT VENTRICULAR DIMENSION IN END SYSTOLE: -5.62

## 2023-12-10 PROCEDURE — 85610 PROTHROMBIN TIME: CPT | Performed by: STUDENT IN AN ORGANIZED HEALTH CARE EDUCATION/TRAINING PROGRAM

## 2023-12-10 PROCEDURE — 36415 COLL VENOUS BLD VENIPUNCTURE: CPT | Performed by: HOSPITALIST

## 2023-12-10 PROCEDURE — 85730 THROMBOPLASTIN TIME PARTIAL: CPT | Performed by: STUDENT IN AN ORGANIZED HEALTH CARE EDUCATION/TRAINING PROGRAM

## 2023-12-10 PROCEDURE — 63600175 PHARM REV CODE 636 W HCPCS: Performed by: STUDENT IN AN ORGANIZED HEALTH CARE EDUCATION/TRAINING PROGRAM

## 2023-12-10 PROCEDURE — 96375 TX/PRO/DX INJ NEW DRUG ADDON: CPT

## 2023-12-10 PROCEDURE — 11000001 HC ACUTE MED/SURG PRIVATE ROOM

## 2023-12-10 PROCEDURE — 84443 ASSAY THYROID STIM HORMONE: CPT | Performed by: HOSPITALIST

## 2023-12-10 PROCEDURE — 25000003 PHARM REV CODE 250: Performed by: HOSPITALIST

## 2023-12-10 PROCEDURE — 25000003 PHARM REV CODE 250: Performed by: FAMILY MEDICINE

## 2023-12-10 PROCEDURE — 36415 COLL VENOUS BLD VENIPUNCTURE: CPT | Performed by: STUDENT IN AN ORGANIZED HEALTH CARE EDUCATION/TRAINING PROGRAM

## 2023-12-10 PROCEDURE — 83036 HEMOGLOBIN GLYCOSYLATED A1C: CPT | Performed by: HOSPITALIST

## 2023-12-10 PROCEDURE — 84439 ASSAY OF FREE THYROXINE: CPT | Performed by: HOSPITALIST

## 2023-12-10 PROCEDURE — 25000003 PHARM REV CODE 250: Performed by: STUDENT IN AN ORGANIZED HEALTH CARE EDUCATION/TRAINING PROGRAM

## 2023-12-10 PROCEDURE — 99233 SBSQ HOSP IP/OBS HIGH 50: CPT | Mod: 25,,, | Performed by: STUDENT IN AN ORGANIZED HEALTH CARE EDUCATION/TRAINING PROGRAM

## 2023-12-10 PROCEDURE — 96374 THER/PROPH/DIAG INJ IV PUSH: CPT

## 2023-12-10 PROCEDURE — 63600175 PHARM REV CODE 636 W HCPCS: Performed by: FAMILY MEDICINE

## 2023-12-10 PROCEDURE — 83735 ASSAY OF MAGNESIUM: CPT | Performed by: HOSPITALIST

## 2023-12-10 RX ORDER — METOPROLOL SUCCINATE 50 MG/1
50 TABLET, EXTENDED RELEASE ORAL 2 TIMES DAILY
Status: DISCONTINUED | OUTPATIENT
Start: 2023-12-10 | End: 2023-12-19 | Stop reason: HOSPADM

## 2023-12-10 RX ORDER — ONDANSETRON 2 MG/ML
4 INJECTION INTRAMUSCULAR; INTRAVENOUS EVERY 8 HOURS PRN
Status: DISCONTINUED | OUTPATIENT
Start: 2023-12-10 | End: 2023-12-19 | Stop reason: HOSPADM

## 2023-12-10 RX ORDER — POLYETHYLENE GLYCOL 3350 17 G/17G
17 POWDER, FOR SOLUTION ORAL DAILY
Status: DISCONTINUED | OUTPATIENT
Start: 2023-12-11 | End: 2023-12-19 | Stop reason: HOSPADM

## 2023-12-10 RX ORDER — DILTIAZEM HYDROCHLORIDE 5 MG/ML
10 INJECTION INTRAVENOUS
Status: COMPLETED | OUTPATIENT
Start: 2023-12-10 | End: 2023-12-10

## 2023-12-10 RX ORDER — METHIMAZOLE 5 MG/1
5 TABLET ORAL DAILY
Status: DISCONTINUED | OUTPATIENT
Start: 2023-12-10 | End: 2023-12-19 | Stop reason: HOSPADM

## 2023-12-10 RX ORDER — CETIRIZINE HYDROCHLORIDE 10 MG/1
10 TABLET ORAL DAILY
Status: DISCONTINUED | OUTPATIENT
Start: 2023-12-10 | End: 2023-12-19 | Stop reason: HOSPADM

## 2023-12-10 RX ORDER — METOPROLOL SUCCINATE 50 MG/1
50 TABLET, EXTENDED RELEASE ORAL DAILY
Status: DISCONTINUED | OUTPATIENT
Start: 2023-12-10 | End: 2023-12-10

## 2023-12-10 RX ORDER — SODIUM CHLORIDE 0.9 % (FLUSH) 0.9 %
10 SYRINGE (ML) INJECTION
Status: DISCONTINUED | OUTPATIENT
Start: 2023-12-10 | End: 2023-12-19 | Stop reason: HOSPADM

## 2023-12-10 RX ORDER — GUAIFENESIN 100 MG/5ML
81 LIQUID (ML) ORAL DAILY
Status: DISCONTINUED | OUTPATIENT
Start: 2023-12-11 | End: 2023-12-19 | Stop reason: HOSPADM

## 2023-12-10 RX ORDER — HEPARIN SODIUM,PORCINE/D5W 25000/250
0-40 INTRAVENOUS SOLUTION INTRAVENOUS CONTINUOUS
Status: DISCONTINUED | OUTPATIENT
Start: 2023-12-10 | End: 2023-12-19

## 2023-12-10 RX ORDER — LISINOPRIL 20 MG/1
20 TABLET ORAL DAILY
Status: DISCONTINUED | OUTPATIENT
Start: 2023-12-10 | End: 2023-12-17

## 2023-12-10 RX ORDER — POLYETHYLENE GLYCOL 3350 17 G/17G
17 POWDER, FOR SOLUTION ORAL DAILY PRN
Status: DISCONTINUED | OUTPATIENT
Start: 2023-12-10 | End: 2023-12-10

## 2023-12-10 RX ORDER — FUROSEMIDE 10 MG/ML
60 INJECTION INTRAMUSCULAR; INTRAVENOUS
Status: DISCONTINUED | OUTPATIENT
Start: 2023-12-10 | End: 2023-12-16

## 2023-12-10 RX ADMIN — METOPROLOL SUCCINATE 50 MG: 50 TABLET, EXTENDED RELEASE ORAL at 08:12

## 2023-12-10 RX ADMIN — FUROSEMIDE 60 MG: 10 INJECTION, SOLUTION INTRAMUSCULAR; INTRAVENOUS at 09:12

## 2023-12-10 RX ADMIN — FUROSEMIDE 80 MG: 10 INJECTION, SOLUTION INTRAMUSCULAR; INTRAVENOUS at 12:12

## 2023-12-10 RX ADMIN — CETIRIZINE HYDROCHLORIDE 10 MG: 10 TABLET, FILM COATED ORAL at 08:12

## 2023-12-10 RX ADMIN — APIXABAN 5 MG: 2.5 TABLET, FILM COATED ORAL at 08:12

## 2023-12-10 RX ADMIN — METHIMAZOLE 5 MG: 5 TABLET ORAL at 09:12

## 2023-12-10 RX ADMIN — LISINOPRIL 20 MG: 20 TABLET ORAL at 08:12

## 2023-12-10 RX ADMIN — HEPARIN SODIUM 12 UNITS/KG/HR: 10000 INJECTION, SOLUTION INTRAVENOUS at 09:12

## 2023-12-10 RX ADMIN — DILTIAZEM HYDROCHLORIDE 10 MG: 5 INJECTION INTRAVENOUS at 12:12

## 2023-12-10 RX ADMIN — FUROSEMIDE 60 MG: 10 INJECTION, SOLUTION INTRAMUSCULAR; INTRAVENOUS at 11:12

## 2023-12-10 RX ADMIN — METOPROLOL SUCCINATE 50 MG: 50 TABLET, EXTENDED RELEASE ORAL at 09:12

## 2023-12-10 NOTE — ASSESSMENT & PLAN NOTE
Patient with Paroxysmal (<7 days) atrial fibrillation which is uncontrolled currently with Beta Blocker and Calcium Channel Blocker. Patient is currently in atrial fibrillation.[id number] Score: 3. Anticoagulation indicated. Anticoagulation done with Eliquis .

## 2023-12-10 NOTE — CARE UPDATE
Care uptake patient is a 68-year-old male with a history of hypertension, hyperthyroidism, CHF reduced ejection fraction, morbid obesity, coronary artery disease, AFib who presented to the emergency department with worsening swelling and shortness a breath.  Patient was seen and examined and admitted to the hospital for diuresis, evaluation of CHF.  Thus far today he is diuresed 1300 cc.  We will leave current diuretics and patient will be seen by Cardiology in a.m..  Echo reveals global hypokinesis which seemingly was not there in his last echo approximately 1 year ago.  Discussed the importance of diet and fluid restriction with patient and we will consult nutrition to give further instruction and ideas.

## 2023-12-10 NOTE — HOSPITAL COURSE
12/11/23-Patient seen and examined today, sitting up in bed. Feels ok. Remains SOB, needs continued IV diuresis. HR controlled. Labs reviewed.     12/12/23-Patient seen and examined today, sitting up in bed. Diuresing well. SOB improving but still significantly overloaded. HR variable. Labs reviewed. Creatinine 1.0, K 3.4. Needs ZOE/DCCV once compensated.    12/13/23-Patient seen and examined today, sitting up in bedside chair. Diuresing well. States he was able to ambulate down the hallway today. Still overloaded, continue diuretics. Labs reviewed/stable.    12/14/23-Patient seen and examined today, resting in bed. Feels ok. Diuresing well. SOB/edema improving. Creatinine stable. Plans for ZOE/DCCV once compensated.     12/15/23-Patient seen and examined today, sitting up in bedside chair. Continues to improve. Starting to get wrinkles in BLE. SOB better. Creatinine 1.3, BUN up-trending.    12/18/2023 patient is stable plan for ZOE cardioversion    12/19/23-Patient seen and examined. Stable and discharge today

## 2023-12-10 NOTE — SUBJECTIVE & OBJECTIVE
Past Medical History:   Diagnosis Date    CAD (coronary artery disease)     had MI 2014    Chronic congestive heart failure 01/13/2021    Diabetes mellitus with no complication     Hypertension associated with diabetes     Hyperthyroidism 01/13/2021    Morbid obesity with body mass index (BMI) of 40.0 to 44.9 in adult     Paroxysmal A-fib        Past Surgical History:   Procedure Laterality Date    APPENDECTOMY      Twice       Review of patient's allergies indicates:  No Known Allergies    No current facility-administered medications on file prior to encounter.     Current Outpatient Medications on File Prior to Encounter   Medication Sig    amLODIPine (NORVASC) 10 MG tablet Take 1 tablet (10 mg total) by mouth once daily.    apixaban (ELIQUIS) 5 mg Tab Take 1 tablet (5 mg total) by mouth 2 (two) times daily.    cetirizine (ZYRTEC) 10 MG tablet Take 1 tablet (10 mg total) by mouth once daily.    furosemide (LASIX) 40 MG tablet Take 1 tablet (40 mg total) by mouth once daily.    lisinopriL (PRINIVIL,ZESTRIL) 20 MG tablet Take 1 tablet (20 mg total) by mouth once daily.    metFORMIN (GLUCOPHAGE) 500 MG tablet Take 1 tablet (500 mg total) by mouth 2 (two) times daily with meals.    methIMAzole (TAPAZOLE) 5 MG Tab Take 1 tablet (5 mg total) by mouth once daily.    metoprolol succinate (TOPROL-XL) 50 MG 24 hr tablet Take 1 tablet (50 mg total) by mouth once daily.     Family History       Problem Relation (Age of Onset)    Cataracts Paternal Grandmother    Glaucoma Paternal Grandmother    Hypertension Mother, Father    Uterine cancer Mother          Tobacco Use    Smoking status: Never     Passive exposure: Never    Smokeless tobacco: Never   Substance and Sexual Activity    Alcohol use: Not Currently    Drug use: Never    Sexual activity: Yes     Partners: Female     Review of Systems   All other systems reviewed and are negative.    Objective:     Vital Signs (Most Recent):  Temp: 97.9 °F (36.6 °C) (12/09/23  2204)  Pulse: (S) (!) 153 (12/10/23 0015)  Resp: (!) 22 (12/10/23 0000)  BP: 124/71 (12/10/23 0047)  SpO2: 99 % (12/10/23 0000) Vital Signs (24h Range):  Temp:  [97.9 °F (36.6 °C)] 97.9 °F (36.6 °C)  Pulse:  [] 153  Resp:  [22-24] 22  SpO2:  [95 %-99 %] 99 %  BP: (121-152)/() 124/71     Weight: (!) 137.4 kg (303 lb)  Body mass index is 42.26 kg/m².     Physical Exam  Vitals reviewed.   Constitutional:       General: He is not in acute distress.     Appearance: He is obese. He is not ill-appearing, toxic-appearing or diaphoretic.   HENT:      Head: Normocephalic and atraumatic.      Right Ear: External ear normal.      Left Ear: External ear normal.      Nose: Nose normal. No congestion or rhinorrhea.      Mouth/Throat:      Mouth: Mucous membranes are moist.      Pharynx: Oropharynx is clear. No oropharyngeal exudate or posterior oropharyngeal erythema.   Eyes:      General: No scleral icterus.     Extraocular Movements: Extraocular movements intact.      Conjunctiva/sclera: Conjunctivae normal.      Pupils: Pupils are equal, round, and reactive to light.   Neck:      Vascular: No carotid bruit.   Cardiovascular:      Rate and Rhythm: Tachycardia present. Rhythm irregular.      Pulses: Normal pulses.      Heart sounds: Normal heart sounds. No murmur heard.     No friction rub. No gallop.   Pulmonary:      Effort: Pulmonary effort is normal. No respiratory distress.      Breath sounds: Normal breath sounds. No stridor. No wheezing, rhonchi or rales.   Chest:      Chest wall: No tenderness.   Abdominal:      General: Bowel sounds are normal. There is no distension.      Palpations: There is no mass.      Tenderness: There is no abdominal tenderness. There is no guarding or rebound.      Hernia: No hernia is present.      Comments: Obese abdomen without any palpable tenderness or masses noted   Genitourinary:     Comments: Diffuse penile and scrotal swelling/edema noted with mild TTP of  scrotum  Musculoskeletal:         General: Swelling present. No tenderness, deformity or signs of injury. Normal range of motion.      Cervical back: Normal range of motion and neck supple. No rigidity or tenderness.      Right lower leg: Edema present.      Left lower leg: Edema present.   Lymphadenopathy:      Cervical: No cervical adenopathy.   Skin:     General: Skin is warm and dry.      Capillary Refill: Capillary refill takes less than 2 seconds.      Coloration: Skin is not jaundiced or pale.      Findings: No bruising, erythema, lesion or rash.      Comments: Bilateral chronic venous stasis noted   Neurological:      General: No focal deficit present.      Mental Status: He is alert and oriented to person, place, and time. Mental status is at baseline.      Cranial Nerves: No cranial nerve deficit.      Sensory: No sensory deficit.      Motor: No weakness.      Coordination: Coordination normal.   Psychiatric:         Mood and Affect: Mood normal.         Behavior: Behavior normal.         Thought Content: Thought content normal.         Judgment: Judgment normal.              CRANIAL NERVES     CN III, IV, VI   Pupils are equal, round, and reactive to light.       Significant Labs: All pertinent labs within the past 24 hours have been reviewed.    Significant Imaging: I have reviewed all pertinent imaging results/findings within the past 24 hours.    LABS:  Recent Results (from the past 24 hour(s))   Urinalysis - Clean Catch    Collection Time: 12/09/23 10:44 PM   Result Value Ref Range    Specimen UA Urine, Clean Catch     Color, UA Yellow Yellow, Straw, Leigh    Appearance, UA Clear Clear    pH, UA 7.0 5.0 - 8.0    Specific Gravity, UA 1.010 1.005 - 1.030    Protein, UA Negative Negative    Glucose, UA Negative Negative    Ketones, UA Negative Negative    Bilirubin (UA) Negative Negative    Occult Blood UA Negative Negative    Nitrite, UA Negative Negative    Urobilinogen, UA 1.0 <2.0 EU/dL     Leukocytes, UA Negative Negative   CBC auto differential    Collection Time: 12/09/23 11:02 PM   Result Value Ref Range    WBC 13.19 (H) 3.90 - 12.70 K/uL    RBC 5.84 4.60 - 6.20 M/uL    Hemoglobin 15.2 14.0 - 18.0 g/dL    Hematocrit 44.8 40.0 - 54.0 %    MCV 77 (L) 82 - 98 fL    MCH 26.0 (L) 27.0 - 31.0 pg    MCHC 33.9 32.0 - 36.0 g/dL    RDW 14.8 (H) 11.5 - 14.5 %    Platelets 217 150 - 450 K/uL    MPV 11.2 9.2 - 12.9 fL    Immature Granulocytes 0.4 0.0 - 0.5 %    Gran # (ANC) 9.8 (H) 1.8 - 7.7 K/uL    Immature Grans (Abs) 0.05 (H) 0.00 - 0.04 K/uL    Lymph # 2.1 1.0 - 4.8 K/uL    Mono # 1.2 (H) 0.3 - 1.0 K/uL    Eos # 0.0 0.0 - 0.5 K/uL    Baso # 0.04 0.00 - 0.20 K/uL    nRBC 0 0 /100 WBC    Gran % 73.9 (H) 38.0 - 73.0 %    Lymph % 15.8 (L) 18.0 - 48.0 %    Mono % 9.4 4.0 - 15.0 %    Eosinophil % 0.2 0.0 - 8.0 %    Basophil % 0.3 0.0 - 1.9 %    Differential Method Automated    Comprehensive metabolic panel    Collection Time: 12/09/23 11:02 PM   Result Value Ref Range    Sodium 141 136 - 145 mmol/L    Potassium 3.4 (L) 3.5 - 5.1 mmol/L    Chloride 101 95 - 110 mmol/L    CO2 24 23 - 29 mmol/L    Glucose 116 (H) 70 - 110 mg/dL    BUN 23 8 - 23 mg/dL    Creatinine 1.3 0.5 - 1.4 mg/dL    Calcium 9.3 8.7 - 10.5 mg/dL    Total Protein 7.9 6.0 - 8.4 g/dL    Albumin 3.6 3.5 - 5.2 g/dL    Total Bilirubin 1.6 (H) 0.1 - 1.0 mg/dL    Alkaline Phosphatase 79 55 - 135 U/L    AST 72 (H) 10 - 40 U/L     (H) 10 - 44 U/L    eGFR >60 >60 mL/min/1.73 m^2    Anion Gap 16 8 - 16 mmol/L   B-Type natriuretic peptide (BNP)    Collection Time: 12/09/23 11:02 PM   Result Value Ref Range     (H) 0 - 99 pg/mL   Troponin I    Collection Time: 12/09/23 11:02 PM   Result Value Ref Range    Troponin I 0.031 (H) 0.000 - 0.026 ng/mL       RADIOLOGY  X-Ray Chest AP Portable    Result Date: 12/9/2023  EXAMINATION: XR CHEST AP PORTABLE CLINICAL HISTORY: Chest Pain; TECHNIQUE: Single frontal view of the chest was performed. COMPARISON:  None FINDINGS: Low lung volumes.  Prominent cardiac silhouette.  No acute process seen Bones are intact.     No acute abnormality. Electronically signed by: Hunter Munoz Date:    12/09/2023 Time:    23:00    X-Ray Chest 1 View    Result Date: 12/5/2023  XR CHEST 1 VIEW Indication: SOB Comparison: None Findings: Interstitial perihilar peribronchial thickening. No pleural effusion or pneumothorax. Mild cardiac silhouette enlargement. Redistribution pulmonary vasculature. No gross bony deformity.    1. CHF      EKG    MICROBIOLOGY    MDM

## 2023-12-10 NOTE — ASSESSMENT & PLAN NOTE
New onset CHF with EF 40%  Needs ischemia evaluation once euvolemic  Monitor I/Os  Continue IV diuresis

## 2023-12-10 NOTE — ASSESSMENT & PLAN NOTE
Patient with known CAD s/p  LHC without stent placement , which is controlled Will continue  Eliquis, ASA, and Statin and monitor for S/Sx of angina/ACS. Continue to monitor on telemetry.

## 2023-12-10 NOTE — ASSESSMENT & PLAN NOTE
Chronic, controlled. Latest blood pressure and vitals reviewed-     Temp:  [97.9 °F (36.6 °C)]   Pulse:  []   Resp:  [20-28]   BP: (102-152)/()   SpO2:  [90 %-99 %] .   Home meds for hypertension were reviewed and noted below.   Hypertension Medications               amLODIPine (NORVASC) 10 MG tablet Take 1 tablet (10 mg total) by mouth once daily.    furosemide (LASIX) 40 MG tablet Take 1 tablet (40 mg total) by mouth once daily.    lisinopriL (PRINIVIL,ZESTRIL) 20 MG tablet Take 1 tablet (20 mg total) by mouth once daily.    metoprolol succinate (TOPROL-XL) 50 MG 24 hr tablet Take 1 tablet (50 mg total) by mouth once daily.          While in the hospital, will manage blood pressure as follows; Continue home antihypertensive regimen    Will utilize p.r.n. blood pressure medication only if patient's blood pressure greater than 160/100 and he develops symptoms such as worsening chest pain or shortness of breath.

## 2023-12-10 NOTE — PHARMACY MED REC
"Admission Medication History     The home medication history was taken by Juan Miguel Silverman.    You may go to "Admission" then "Reconcile Home Medications" tabs to review and/or act upon these items.     The home medication list has been updated by the Pharmacy department.   Please read ALL comments highlighted in yellow.   Please address this information as you see fit.    Feel free to contact us if you have any questions or require assistance.      Medications listed below were obtained from: [employer]- [email or web address] and Medical records  (Not in a hospital admission)      Juan Miguel Silverman  [id number]    Current Outpatient Medications on File Prior to Encounter   Medication Sig Dispense Refill Last Dose    amLODIPine (NORVASC) 10 MG tablet Take 1 tablet (10 mg total) by mouth once daily. 90 tablet 3 12/9/2023    apixaban (ELIQUIS) 5 mg Tab Take 1 tablet (5 mg total) by mouth 2 (two) times daily. 180 tablet 3 12/9/2023    cetirizine (ZYRTEC) 10 MG tablet Take 1 tablet (10 mg total) by mouth once daily. 90 tablet 3 12/9/2023    furosemide (LASIX) 40 MG tablet Take 1 tablet (40 mg total) by mouth once daily. 90 tablet 3 12/9/2023    lisinopriL (PRINIVIL,ZESTRIL) 20 MG tablet Take 1 tablet (20 mg total) by mouth once daily. 90 tablet 3 12/9/2023    metFORMIN (GLUCOPHAGE) 500 MG tablet Take 1 tablet (500 mg total) by mouth 2 (two) times daily with meals. 180 tablet 3 12/9/2023    methIMAzole (TAPAZOLE) 5 MG Tab Take 1 tablet (5 mg total) by mouth once daily. 90 tablet 3 12/9/2023    metoprolol succinate (TOPROL-XL) 50 MG 24 hr tablet Take 1 tablet (50 mg total) by mouth once daily. 90 tablet 3 12/9/2023                           .          "

## 2023-12-10 NOTE — CONSULTS
[facility]  Cardiology  Consult Note    Patient Name: Roland Ng  MRN: [id number]  Admission Date: 12/9/2023  Hospital Length of Stay: 0 days  Code Status: Full Code   Attending Provider: Verena Kothari MD   Consulting Provider: Phuc Marshall MD  Primary Care Physician: Janis Green NP  Principal Problem:Dyspnea    Patient information was obtained from patient and ER records.     Inpatient consult to Cardiology  Consult performed by: Phuc Marshall MD  Consult ordered by: Tommy Fair MD  Reason for consult: CHF exacerbation        Subjective:       HPI:   68 year old  male with PMHX of CHF and atrial fibrillation presented  to ED with worsening constant SOB x 1 month. Pt  reports sleeping with four pillows and waking up with SOB in the night.  Associated sxs include  intermittent sharp chest pain,  dizziness,  abdominal swelling and BLE  swelling. Pt states he is on a fluid  pill he takes BID since BLE  swelling and  venous stasis onset in 2016. Pt states abdominal swelling is new. Pt was seen in [facility]  ED on 12/523 for SOB and was instructed  to double Lasix dose. Noted HR  was in 130s in aflutter on 12/5, was seen at [facility].      Troponin was 0.031 yesterday. . Potassium  3.4. LFTs are elevated. LST 72/ WBC is 13.   EKG shows atrial flutter with variable AV block, Right bundle branch block, Septal infarct.   EF down to 40%, prior was 60% in March 2022        Review of Systems   Constitutional: Negative for diaphoresis, malaise/fatigue, weight gain and weight loss.   HENT:  Negative for congestion and nosebleeds.    Cardiovascular:  Positive for chest pain and leg swelling (BLE). Negative for claudication, cyanosis, dyspnea on exertion, irregular heartbeat, near-syncope, orthopnea, palpitations, paroxysmal nocturnal dyspnea and syncope.   Respiratory:  Positive for shortness of breath. Negative for cough, hemoptysis, sleep disturbances due to breathing, snoring,  sputum production and wheezing.    Hematologic/Lymphatic: Negative for bleeding problem. Does not bruise/bleed easily.   Skin:  Negative for rash.   Musculoskeletal:  Negative for arthritis, back pain, falls, joint pain, muscle cramps and muscle weakness.   Gastrointestinal:  Positive for bloating. Negative for abdominal pain, constipation, diarrhea, heartburn, hematemesis, hematochezia, melena, nausea and vomiting.   Genitourinary:  Negative for dysuria, hematuria and nocturia.   Neurological:  Negative for excessive daytime sleepiness, dizziness, headaches, light-headedness, loss of balance, numbness, vertigo and weakness.     Objective:     Vital Signs (Most Recent):  Temp: 98.2 °F (36.8 °C) (12/10/23 1424)  Pulse: 86 (12/10/23 1424)  Resp: 20 (12/10/23 1424)  BP: 134/62 (12/10/23 1424)  SpO2: 97 % (12/10/23 1424) Vital Signs (24h Range):  Temp:  [97.9 °F (36.6 °C)-98.2 °F (36.8 °C)] 98.2 °F (36.8 °C)  Pulse:  [] 86  Resp:  [20-28] 20  SpO2:  [90 %-99 %] 97 %  BP: (100-152)/() 134/62     Weight: (!) 137.4 kg (303 lb)  Body mass index is 42.26 kg/m².     SpO2: 97 %         Intake/Output Summary (Last 24 hours) at 12/10/2023 1450  Last data filed at 12/10/2023 1323  Gross per 24 hour   Intake --   Output 650 ml   Net -650 ml       Lines/Drains/Airways       Peripheral Intravenous Line  Duration                  Peripheral IV - Single Lumen 12/10/23 0241 20 G Right Antecubital <1 day                       Physical Exam  Vitals and nursing note reviewed.   Constitutional:       Appearance: Normal appearance. He is well-developed.   HENT:      Head: Normocephalic.      Mouth/Throat:      Mouth: Mucous membranes are moist.   Neck:      Vascular: No carotid bruit or JVD.   Cardiovascular:      Rate and Rhythm: Normal rate and regular rhythm.      Pulses: Normal pulses.      Heart sounds: Normal heart sounds. No murmur heard.     No friction rub.   Pulmonary:      Effort: Pulmonary effort is normal. No  respiratory distress.      Breath sounds: Rales present. No wheezing.      Comments: (+) Low volume at lung bases     Abdominal:      General: Bowel sounds are normal. There is distension.      Palpations: Abdomen is soft.      Tenderness: There is no abdominal tenderness. There is no guarding.      Comments: Abdominal swelling.   Musculoskeletal:         General: No swelling or tenderness.      Cervical back: Neck supple. No tenderness.      Right lower leg: No edema.      Left lower leg: No edema.   Skin:     General: Skin is warm and dry.      Capillary Refill: Capillary refill takes less than 2 seconds.      Findings: No rash.   Neurological:      General: No focal deficit present.      Mental Status: He is alert and oriented to person, place, and time.   Psychiatric:         Mood and Affect: Mood normal.         Behavior: Behavior normal.         Thought Content: Thought content normal.            Significant Labs: All pertinent lab results from the last 24 hours have been reviewed. and   Recent Lab Results         12/10/23  0854   12/10/23  0240   12/09/23  2302   12/09/23  2244        Albumin     3.6         ALP     79         ALT     102         Anion Gap     16         Appearance, UA       Clear       AST     72         Baso #     0.04         Basophil %     0.3         Bilirubin (UA)       Negative       BILIRUBIN TOTAL     1.6  Comment: For infants and newborns, interpretation of results should be based  on gestational age, weight and in agreement with clinical  observations.    Premature Infant recommended reference ranges:  Up to 24 hours.............<8.0 mg/dL  Up to 48 hours............<12.0 mg/dL  3-5 days..................<15.0 mg/dL  6-29 days.................<15.0 mg/dL           BNP     164  Comment: Values of less than 100 pg/ml are consistent with non-CHF populations.         BUN     23         Calcium     9.3         Chloride     101         CO2     24         Color, UA       Yellow        Creatinine     1.3         Differential Method     Automated         eGFR     >60         Eos #     0.0         Eosinophil %     0.2         Estimated Avg Glucose   140           Free T4   1.50           Glucose     116         Glucose, UA       Negative       Gran # (ANC)     9.8         Gran %     73.9         Hematocrit     44.8         Hemoglobin     15.2         Hemoglobin A1C External   6.5  Comment: ADA Screening Guidelines:  5.7-6.4%  Consistent with prediabetes  >or=6.5%  Consistent with diabetes    High levels of fetal hemoglobin interfere with the HbA1C  assay. Heterozygous hemoglobin variants (HbS, HgC, etc)do  not significantly interfere with this assay.   However, presence of multiple variants may affect accuracy.             Immature Grans (Abs)     0.05  Comment: Mild elevation in immature granulocytes is non specific and   can be seen in a variety of conditions including stress response,   acute inflammation, trauma and pregnancy. Correlation with other   laboratory and clinical findings is essential.           Immature Granulocytes     0.4         Ketones, UA       Negative       Leukocytes, UA       Negative       Lymph #     2.1         Lymph %     15.8         Magnesium    1.9           MCH     26.0         MCHC     33.9         MCV     77         Mono #     1.2         Mono %     9.4         MPV     11.2         NITRITE UA       Negative       nRBC     0         Occult Blood UA       Negative       pH, UA       7.0       Platelet Count     217         POCT Glucose 107             Potassium     3.4         PROTEIN TOTAL     7.9         Protein, UA       Negative  Comment: Recommend a 24 hour urine protein or a urine   protein/creatinine ratio if globulin induced proteinuria is  clinically suspected.         RBC     5.84         RDW     14.8         Sodium     141         Specific [place], UA       1.010       Specimen UA       Urine, Clean Catch       Troponin I     0.031  Comment: The reference  interval for Troponin I represents the 99th percentile   cutoff   for our facility and is consistent with 3rd generation assay   performance.           TSH   0.083           UROBILINOGEN UA       1.0       WBC     13.19                 Significant Imaging: Echocardiogram: 2D echo with color flow doppler: No results found for this or any previous visit. and Transthoracic echo (TTE) complete (Cupid Only):   Results for orders placed or performed during the hospital encounter of 12/09/23   Echo   Result Value Ref Range    BSA 2.62 m2    LVOT stroke volume 44.74 cm3    LVIDd 5.31 3.5 - 6.0 cm    LV Systolic Volume 81.84 mL    LV Systolic Volume Index 32.5 mL/m2    LVIDs 4.27 (A) 2.1 - 4.0 cm    LV Diastolic Volume 135.86 mL    LV Diastolic Volume Index 53.91 mL/m2    IVS 1.28 (A) 0.6 - 1.1 cm    LVOT diameter 2.07 cm    LVOT area 3.4 cm2    FS 20 (A) 28 - 44 %    Left Ventricle Relative Wall Thickness 0.49 cm    Posterior Wall 1.29 (A) 0.6 - 1.1 cm    LV mass 283.13 g    LV Mass Index 112 g/m2    MV Peak E Ishmael 1.08 m/s    TDI LATERAL 0.11 m/s    TDI SEPTAL 0.08 m/s    E/E' ratio 11.37 m/s    MV Peak A Ishmael 0.23 m/s    TR Max Ishmael 2.78 m/s    E/A ratio 4.70     IVRT 31.40 msec    E wave deceleration time 167.06 msec    LV SEPTAL E/E' RATIO 13.50 m/s    LV LATERAL E/E' RATIO 9.82 m/s    LVOT peak ishmael 0.68 m/s    Left Ventricular Outflow Tract Mean Velocity 0.44 cm/s    Left Ventricular Outflow Tract Mean Gradient 0.91 mmHg    RV mid diameter 4.02 cm    RVOT peak VTI 9.8 cm    TAPSE 1.70 cm    LA size 5.35 cm    Left Atrium Major Axis 6.56 cm    RA Major Axis 5.76 cm    AV mean gradient 3 mmHg    AV peak gradient 4 mmHg    Ao peak ishmael 1.04 m/s    Ao VTI 17.90 cm    LVOT peak VTI 13.30 cm    AV valve area 2.50 cm²    AV Velocity Ratio 0.65     AV index (prosthetic) 0.74     MARILYN by Velocity Ratio 2.20 cm²    Mr max ishmael 4.52 m/s    MV stenosis pressure 1/2 time 48.45 ms    MV valve area p 1/2 method 4.54 cm2    Triscuspid Valve  Regurgitation Peak Gradient 31 mmHg    PV mean gradient 1 mmHg    PV peak gradient 1     RVOT peak ria 0.56 m/s    Ao root annulus 3.22 cm    STJ 3.20 cm    Ascending aorta 3.27 cm    IVC diameter 1.93 cm    Mean e' 0.10 m/s    ZLVIDS -5.62     ZLVIDD -10.17     LA WIDTH 4.7 cm    RA Width 4.4 cm    and EKG:   Results for orders placed or performed during the hospital encounter of 12/09/23   EKG 12-lead    Narrative    Test Reason : R06.02,    Vent. Rate : 109 BPM     Atrial Rate : 256 BPM     P-R Int : 000 ms          QRS Dur : 136 ms      QT Int : 412 ms       P-R-T Axes : 000 -02 016 degrees     QTc Int : 554 ms    Atrial flutter with variable A-V block  Right bundle branch block  Septal infarct ,age undetermined  Inferior infarct ,age undetermined  Abnormal ECG  When compared with ECG of 04-MAR-2022 15:43,  Atrial flutter has replaced Sinus rhythm  Septal infarct is now Present  QT has lengthened    Referred By: [email or web address]   SELF           Confirmed By:      Results for orders placed during the hospital encounter of 12/09/23    Echo    Interpretation Summary    Left Ventricle: The left ventricle is normal in size. Normal wall thickness. There is concentric remodeling. Mild global hyperkinesis present. There is reduced systolic function. Ejection fraction by visual approximation is 40%. There is normal diastolic function.    Right Ventricle: Moderate right ventricular enlargement. Wall thickness is normal. Systolic function is moderately reduced.    Mitral Valve: There is mild regurgitation.    Tricuspid Valve: There is moderate regurgitation.    Pulmonary Artery: The estimated pulmonary artery systolic pressure is 39 mmHg.    IVC/SVC: Intermediate venous pressure at 8 mmHg.        Assessment and Plan:     Atrial flutter  Rate controlled with toprol xl 50 bid  Once euvolemic, if still symptomatic with SOB, will consider ZOE/Cardioversion vs EP evaluation for flutter/fib ablation    Paroxysmal A-fib  Continue  anticoagulation and toprol xl    Hypertension  Titrate medications     Acute exacerbation of CHF (congestive heart failure)  New onset CHF with EF 40%  Needs ischemia evaluation once euvolemic  Monitor I/Os  Continue IV diuresis         VTE Risk Mitigation (From admission, onward)           Ordered     heparin 25,000 units in dextrose 5% 250 mL (100 units/mL) infusion LOW INTENSITY nomogram - [facility]  Continuous        Question:  Begin at (units/kg/hr)  Answer:  12    12/10/23 1752     heparin 25,000 units in dextrose 5% (100 units/ml) IV bolus from bag - ADDITIONAL PRN BOLUS - 60 units/kg  As needed (PRN)        Question:  Heparin Infusion Adjustment (DO NOT MODIFY ANSWER)  Answer:  \\[email or web address]\[email or web address]\Images\[email or web address]\HeparinInfusions\heparin LOW INTENSITY nomogram for [facility] [id number]    12/10/23 1752     heparin 25,000 units in dextrose 5% (100 units/ml) IV bolus from bag - ADDITIONAL PRN BOLUS - 30 units/kg  As needed (PRN)        Question:  Heparin Infusion Adjustment (DO NOT MODIFY ANSWER)  Answer:  \[email or web address]\Images\[email or web address]\HeparinInfusions\heparin LOW INTENSITY nomogram for [facility] [email or web address]    12/10/23 1752     Reason for No Pharmacological VTE Prophylaxis  Once        Question:  Reasons:  Answer:  Already adequately anticoagulated on oral Anticoagulants    12/10/23 0102     IP VTE HIGH RISK PATIENT  Once         12/10/23 0102     Place sequential compression device  Until discontinued         12/10/23 0102                    Thank you for your consult.     Phuc Marshall MD  Cardiology   [facility]

## 2023-12-10 NOTE — ED PROVIDER NOTES
SCRIBE #1 NOTE: I, Josette Guerra, am scribing for, and in the presence of, Floresita Tracey MD. I have scribed the entire note.       History     Chief Complaint   Patient presents with    Shortness of Breath     Review of patient's allergies indicates:  No Known Allergies      History of Present Illness     HPI    12/9/2023, 11:21 PM  History obtained from the patient      History of Present Illness: Roland Ng is a 68 y.o. male patient with a PMHx of CHF, hyperthyroidism, CAD, HTN, DM, and paroxysmal AFIB who presents to the Emergency Department for evaluation of SOB which onset gradually a month ago. Pt says that he has been having issues with SOB and leg and groin swelling for the last month. He has been compliant with his fluid pills and got his dosage increased, but it only helped his sxs temporarily. Symptoms are constant and moderate in severity. No mitigating or exacerbating factors reported. Associated sxs include wheezing, penile swelling, scrotal swelling, and leg swelling. Patient denies any CP, n/v, dysuria, weakness, and all other sxs at this time. Pt's lasix was increased to 80mg a day. Pt is not on @home O2. No further complaints or concerns at this time.       Arrival mode: Personal vehicle    PCP: Janis Green NP        Past Medical History:  Past Medical History:   Diagnosis Date    CAD (coronary artery disease)     had MI 2014    Chronic congestive heart failure 01/13/2021    Diabetes mellitus with no complication     Hypertension associated with diabetes     Hyperthyroidism 01/13/2021    Morbid obesity with body mass index (BMI) of 40.0 to 44.9 in adult     Paroxysmal A-fib        Past Surgical History:  Past Surgical History:   Procedure Laterality Date    APPENDECTOMY      Twice         Family History:  Family History   Problem Relation Age of Onset    Hypertension Mother     Uterine cancer Mother     Hypertension Father     Glaucoma Paternal Grandmother     Cataracts  Paternal Grandmother        Social History:  Social History     Tobacco Use    Smoking status: Never     Passive exposure: Never    Smokeless tobacco: Never   Substance and Sexual Activity    Alcohol use: Not Currently    Drug use: Never    Sexual activity: Yes     Partners: Female        Review of Systems     Review of Systems   Constitutional:  Negative for fever.   HENT:  Negative for sore throat.    Respiratory:  Positive for shortness of breath and wheezing.    Cardiovascular:  Positive for leg swelling. Negative for chest pain.   Gastrointestinal:  Negative for nausea.   Genitourinary:  Positive for penile swelling and scrotal swelling. Negative for dysuria.   Musculoskeletal:  Negative for back pain.   Skin:  Negative for rash.   Neurological:  Negative for weakness.   Hematological:  Does not bruise/bleed easily.   All other systems reviewed and are negative.       Physical Exam     Initial Vitals [12/09/23 2204]   BP Pulse Resp Temp SpO2   121/65 109 (!) 24 97.9 °F (36.6 °C) 97 %      MAP       --          Physical Exam  Nursing Notes and Vital Signs Reviewed.  Constitutional: Patient is in no acute distress. Well-developed and well-nourished.  Head: Atraumatic. Normocephalic.  Eyes: PERRL. EOM intact. Conjunctivae are not pale. No scleral icterus.  ENT: Mucous membranes are moist. Oropharynx is clear and symmetric.    Neck: Supple. Full ROM. No lymphadenopathy.  Cardiovascular: Atrial flutter. No murmurs, rubs, or gallops. Distal pulses are 2+ and symmetric.  Pulmonary/Chest: No respiratory distress. Clear to auscultation bilaterally. No wheezing or rales.  Abdominal: Soft and non-distended.  There is no tenderness.  No rebound, guarding, or rigidity. Good bowel sounds.  Genitourinary: No CVA tenderness. Extensive penile and scrotal edema.  Musculoskeletal: Moves all extremities. No obvious deformities. 3+ edema bilateral lower extremities with venous stasis skin changes. No calf tenderness.  Skin: Warm  and dry.  Neurological:  Alert, awake, and appropriate.  Normal speech.  No acute focal neurological deficits are appreciated.  Psychiatric: Normal affect. Good eye contact. Appropriate in content.     ED Course   Critical Care    Date/Time: 12/10/2023 12:11 AM    Performed by: Floresita Tracey MD  Authorized by: Floresita Tracey MD  Direct patient critical care time: 15 minutes  Additional history critical care time: 10 minutes  Ordering / reviewing critical care time: 5 minutes  Documentation critical care time: 5 minutes  Consulting other physicians critical care time: 5 minutes  Consult with family critical care time: 5 minutes  Total critical care time (exclusive of procedural time) : 45 minutes  Critical care time was exclusive of separately billable procedures and treating other patients and teaching time.  Critical care was necessary to treat or prevent imminent or life-threatening deterioration of the following conditions: CHF and atrial flutter.  Critical care was time spent personally by me on the following activities: blood draw for specimens, development of treatment plan with patient or surrogate, discussions with consultants, interpretation of cardiac output measurements, evaluation of patient's response to treatment, examination of patient, obtaining history from patient or surrogate, ordering and performing treatments and interventions, ordering and review of laboratory studies, ordering and review of radiographic studies, pulse oximetry, re-evaluation of patient's condition and review of old charts.        ED Vital Signs:  Vitals:    12/11/23 1903 12/11/23 2000 12/11/23 2100 12/11/23 2121   BP:    138/71   Pulse:  (!) 112 84 93   Resp:    18   Temp:    98.2 °F (36.8 °C)   TempSrc:    Oral   SpO2:    99%   Weight: (!) 146.3 kg (322 lb 8.5 oz)      Height:        12/11/23 2300 12/12/23 0001 12/12/23 0100 12/12/23 0300   BP:  120/73     Pulse: 110 87 78 84   Resp:  20     Temp:  98 °F (36.7 °C)      TempSrc:  Oral     SpO2:  97%     Weight:       Height:        12/12/23 0400 12/12/23 0422 12/12/23 0500 12/12/23 0519   BP:  119/62     Pulse: 84 84 84    Resp:  19     Temp:  98.3 °F (36.8 °C)     TempSrc:  Oral     SpO2:  95%     Weight:    (!) 145.1 kg (319 lb 14.2 oz)   Height:        12/12/23 0738 12/12/23 1205 12/12/23 1625   BP: 133/74 131/73 109/65   Pulse: (!) 43 85 84   Resp: 18 15 16   Temp: 98.1 °F (36.7 °C) 98.4 °F (36.9 °C) 98.3 °F (36.8 °C)   TempSrc: Oral Oral Oral   SpO2: 98% 97% 98%   Weight:      Height:          Abnormal Lab Results:  Labs Reviewed   CBC W/ AUTO DIFFERENTIAL - Abnormal; Notable for the following components:       Result Value    WBC 13.19 (*)     MCV 77 (*)     MCH 26.0 (*)     RDW 14.8 (*)     Gran # (ANC) 9.8 (*)     Immature Grans (Abs) 0.05 (*)     Mono # 1.2 (*)     Gran % 73.9 (*)     Lymph % 15.8 (*)     All other components within normal limits   COMPREHENSIVE METABOLIC PANEL - Abnormal; Notable for the following components:    Potassium 3.4 (*)     Glucose 116 (*)     Total Bilirubin 1.6 (*)     AST 72 (*)      (*)     All other components within normal limits   B-TYPE NATRIURETIC PEPTIDE - Abnormal; Notable for the following components:     (*)     All other components within normal limits   TROPONIN I - Abnormal; Notable for the following components:    Troponin I 0.031 (*)     All other components within normal limits   HEMOGLOBIN A1C - Abnormal; Notable for the following components:    Hemoglobin A1C 6.5 (*)     Estimated Avg Glucose 140 (*)     All other components within normal limits   TSH - Abnormal; Notable for the following components:    TSH 0.083 (*)     All other components within normal limits   URINALYSIS   MAGNESIUM   T4, FREE   TSH   T4, FREE   POCT GLUCOSE        All Lab Results:  Results for orders placed or performed during the hospital encounter of 12/09/23   CBC auto differential   Result Value Ref Range    WBC 13.19 (H) 3.90 -  12.70 K/uL    RBC 5.84 4.60 - 6.20 M/uL    Hemoglobin 15.2 14.0 - 18.0 g/dL    Hematocrit 44.8 40.0 - 54.0 %    MCV 77 (L) 82 - 98 fL    MCH 26.0 (L) 27.0 - 31.0 pg    MCHC 33.9 32.0 - 36.0 g/dL    RDW 14.8 (H) 11.5 - 14.5 %    Platelets 217 150 - 450 K/uL    MPV 11.2 9.2 - 12.9 fL    Immature Granulocytes 0.4 0.0 - 0.5 %    Gran # (ANC) 9.8 (H) 1.8 - 7.7 K/uL    Immature Grans (Abs) 0.05 (H) 0.00 - 0.04 K/uL    Lymph # 2.1 1.0 - 4.8 K/uL    Mono # 1.2 (H) 0.3 - 1.0 K/uL    Eos # 0.0 0.0 - 0.5 K/uL    Baso # 0.04 0.00 - 0.20 K/uL    nRBC 0 0 /100 WBC    Gran % 73.9 (H) 38.0 - 73.0 %    Lymph % 15.8 (L) 18.0 - 48.0 %    Mono % 9.4 4.0 - 15.0 %    Eosinophil % 0.2 0.0 - 8.0 %    Basophil % 0.3 0.0 - 1.9 %    Differential Method Automated    Comprehensive metabolic panel   Result Value Ref Range    Sodium 141 136 - 145 mmol/L    Potassium 3.4 (L) 3.5 - 5.1 mmol/L    Chloride 101 95 - 110 mmol/L    CO2 24 23 - 29 mmol/L    Glucose 116 (H) 70 - 110 mg/dL    BUN 23 8 - 23 mg/dL    Creatinine 1.3 0.5 - 1.4 mg/dL    Calcium 9.3 8.7 - 10.5 mg/dL    Total Protein 7.9 6.0 - 8.4 g/dL    Albumin 3.6 3.5 - 5.2 g/dL    Total Bilirubin 1.6 (H) 0.1 - 1.0 mg/dL    Alkaline Phosphatase 79 55 - 135 U/L    AST 72 (H) 10 - 40 U/L     (H) 10 - 44 U/L    eGFR >60 >60 mL/min/1.73 m^2    Anion Gap 16 8 - 16 mmol/L   Urinalysis - Clean Catch   Result Value Ref Range    Specimen UA Urine, Clean Catch     Color, UA Yellow Yellow, Straw, Leigh    Appearance, UA Clear Clear    pH, UA 7.0 5.0 - 8.0    Specific Gravity, UA 1.010 1.005 - 1.030    Protein, UA Negative Negative    Glucose, UA Negative Negative    Ketones, UA Negative Negative    Bilirubin (UA) Negative Negative    Occult Blood UA Negative Negative    Nitrite, UA Negative Negative    Urobilinogen, UA 1.0 <2.0 EU/dL    Leukocytes, UA Negative Negative   B-Type natriuretic peptide (BNP)   Result Value Ref Range     (H) 0 - 99 pg/mL   Troponin I   Result Value Ref Range     Troponin I 0.031 (H) 0.000 - 0.026 ng/mL   Hemoglobin A1c   Result Value Ref Range    Hemoglobin A1C 6.5 (H) 4.0 - 5.6 %    Estimated Avg Glucose 140 (H) 68 - 131 mg/dL   Magnesium   Result Value Ref Range    Magnesium 1.9 1.6 - 2.6 mg/dL   TSH   Result Value Ref Range    TSH 0.083 (L) 0.400 - 4.000 uIU/mL   T4, Free   Result Value Ref Range    Free T4 1.50 0.71 - 1.51 ng/dL   APTT   Result Value Ref Range    aPTT 28.5 21.0 - 32.0 sec   Protime-INR   Result Value Ref Range    Prothrombin Time 14.2 (H) 9.0 - 12.5 sec    INR 1.4 (H) 0.8 - 1.2   Basic metabolic panel   Result Value Ref Range    Sodium 142 136 - 145 mmol/L    Potassium 3.4 (L) 3.5 - 5.1 mmol/L    Chloride 104 95 - 110 mmol/L    CO2 25 23 - 29 mmol/L    Glucose 124 (H) 70 - 110 mg/dL    BUN 22 8 - 23 mg/dL    Creatinine 1.1 0.5 - 1.4 mg/dL    Calcium 8.3 (L) 8.7 - 10.5 mg/dL    Anion Gap 13 8 - 16 mmol/L    eGFR >60 >60 mL/min/1.73 m^2   CBC Auto Differential   Result Value Ref Range    WBC 11.15 3.90 - 12.70 K/uL    RBC 5.39 4.60 - 6.20 M/uL    Hemoglobin 13.8 (L) 14.0 - 18.0 g/dL    Hematocrit 41.7 40.0 - 54.0 %    MCV 77 (L) 82 - 98 fL    MCH 25.6 (L) 27.0 - 31.0 pg    MCHC 33.1 32.0 - 36.0 g/dL    RDW 15.2 (H) 11.5 - 14.5 %    Platelets 177 150 - 450 K/uL    MPV 11.4 9.2 - 12.9 fL    Immature Granulocytes 0.4 0.0 - 0.5 %    Gran # (ANC) 7.7 1.8 - 7.7 K/uL    Immature Grans (Abs) 0.04 0.00 - 0.04 K/uL    Lymph # 2.1 1.0 - 4.8 K/uL    Mono # 1.2 (H) 0.3 - 1.0 K/uL    Eos # 0.1 0.0 - 0.5 K/uL    Baso # 0.02 0.00 - 0.20 K/uL    nRBC 0 0 /100 WBC    Gran % 69.1 38.0 - 73.0 %    Lymph % 18.8 18.0 - 48.0 %    Mono % 10.7 4.0 - 15.0 %    Eosinophil % 0.8 0.0 - 8.0 %    Basophil % 0.2 0.0 - 1.9 %    Differential Method Automated    Magnesium   Result Value Ref Range    Magnesium 2.0 1.6 - 2.6 mg/dL   APTT   Result Value Ref Range    aPTT 31.4 21.0 - 32.0 sec   APTT   Result Value Ref Range    aPTT 43.9 (H) 21.0 - 32.0 sec   APTT   Result Value Ref Range     aPTT 34.3 (H) 21.0 - 32.0 sec   APTT   Result Value Ref Range    aPTT 49.4 (H) 21.0 - 32.0 sec   Basic metabolic panel   Result Value Ref Range    Sodium 143 136 - 145 mmol/L    Potassium 3.4 (L) 3.5 - 5.1 mmol/L    Chloride 104 95 - 110 mmol/L    CO2 29 23 - 29 mmol/L    Glucose 124 (H) 70 - 110 mg/dL    BUN 17 8 - 23 mg/dL    Creatinine 1.0 0.5 - 1.4 mg/dL    Calcium 8.7 8.7 - 10.5 mg/dL    Anion Gap 10 8 - 16 mmol/L    eGFR >60 >60 mL/min/1.73 m^2   APTT   Result Value Ref Range    aPTT 49.2 (H) 21.0 - 32.0 sec   CBC auto differential   Result Value Ref Range    WBC 12.01 3.90 - 12.70 K/uL    RBC 5.77 4.60 - 6.20 M/uL    Hemoglobin 15.0 14.0 - 18.0 g/dL    Hematocrit 45.6 40.0 - 54.0 %    MCV 79 (L) 82 - 98 fL    MCH 26.0 (L) 27.0 - 31.0 pg    MCHC 32.9 32.0 - 36.0 g/dL    RDW 16.2 (H) 11.5 - 14.5 %    Platelets 194 150 - 450 K/uL    MPV 11.6 9.2 - 12.9 fL    Immature Granulocytes 0.4 0.0 - 0.5 %    Gran # (ANC) 8.8 (H) 1.8 - 7.7 K/uL    Immature Grans (Abs) 0.05 (H) 0.00 - 0.04 K/uL    Lymph # 1.9 1.0 - 4.8 K/uL    Mono # 1.2 (H) 0.3 - 1.0 K/uL    Eos # 0.1 0.0 - 0.5 K/uL    Baso # 0.02 0.00 - 0.20 K/uL    nRBC 0 0 /100 WBC    Gran % 73.6 (H) 38.0 - 73.0 %    Lymph % 15.4 (L) 18.0 - 48.0 %    Mono % 9.7 4.0 - 15.0 %    Eosinophil % 0.7 0.0 - 8.0 %    Basophil % 0.2 0.0 - 1.9 %    Differential Method Automated    Echo   Result Value Ref Range    BSA 2.62 m2    LVOT stroke volume 44.74 cm3    LVIDd 5.31 3.5 - 6.0 cm    LV Systolic Volume 81.84 mL    LV Systolic Volume Index 32.5 mL/m2    LVIDs 4.27 (A) 2.1 - 4.0 cm    LV Diastolic Volume 135.86 mL    LV Diastolic Volume Index 53.91 mL/m2    IVS 1.28 (A) 0.6 - 1.1 cm    LVOT diameter 2.07 cm    LVOT area 3.4 cm2    FS 20 (A) 28 - 44 %    Left Ventricle Relative Wall Thickness 0.49 cm    Posterior Wall 1.29 (A) 0.6 - 1.1 cm    LV mass 283.13 g    LV Mass Index 112 g/m2    MV Peak E Ishmael 1.08 m/s    TDI LATERAL 0.11 m/s    TDI SEPTAL 0.08 m/s    E/E' ratio 11.37 m/s     MV Peak A Ishmael 0.23 m/s    TR Max Ishmael 2.78 m/s    E/A ratio 4.70     IVRT 31.40 msec    E wave deceleration time 167.06 msec    LV SEPTAL E/E' RATIO 13.50 m/s    LV LATERAL E/E' RATIO 9.82 m/s    LVOT peak ishmael 0.68 m/s    Left Ventricular Outflow Tract Mean Velocity 0.44 cm/s    Left Ventricular Outflow Tract Mean Gradient 0.91 mmHg    RV mid diameter 4.02 cm    RVOT peak VTI 9.8 cm    TAPSE 1.70 cm    LA size 5.35 cm    Left Atrium Major Axis 6.56 cm    RA Major Axis 5.76 cm    AV mean gradient 3 mmHg    AV peak gradient 4 mmHg    Ao peak ishmael 1.04 m/s    Ao VTI 17.90 cm    LVOT peak VTI 13.30 cm    AV valve area 2.50 cm²    AV Velocity Ratio 0.65     AV index (prosthetic) 0.74     MARILYN by Velocity Ratio 2.20 cm²    Mr max ishmael 4.52 m/s    MV stenosis pressure 1/2 time 48.45 ms    MV valve area p 1/2 method 4.54 cm2    Triscuspid Valve Regurgitation Peak Gradient 31 mmHg    PV mean gradient 1 mmHg    RVOT peak ishmael 0.56 m/s    Ao root annulus 3.22 cm    STJ 3.20 cm    Ascending aorta 3.27 cm    IVC diameter 1.93 cm    Mean e' 0.10 m/s    ZLVIDS -5.62     ZLVIDD -10.17     LA WIDTH 4.7 cm    RA Width 4.4 cm    EF 40 %    TV resting pulmonary artery pressure 39 mmHg    RV TB RVSP 11 mmHg    Est. RA pres 8 mmHg   POCT glucose   Result Value Ref Range    POCT Glucose 107 70 - 110 mg/dL         Imaging Results:  Imaging Results              US Scrotum And Testicles (Final result)  Result time 12/10/23 08:15:56      Final result by Cole Cintron MD (12/10/23 08:15:56)                   Impression:      Large bilateral hydrocele.  Extensive scrotal wall thickening or edema.  No definite abscess.  Negative testicular ultrasound with no torsion identified.      Electronically signed by: Cole Cintron MD  Date:    12/10/2023  Time:    08:15               Narrative:    EXAMINATION:  US SCROTUM AND TESTICLES    CLINICAL HISTORY:  scrotal pain and swelling;    COMPARISON:  None    FINDINGS:  The right testicle is 2.8  x 3.4 x 4.9cm.  Right epididymal head is9 x 16mm.  8.6 mm right epididymal head cyst.  Normal blood flow.  Large hydrocele.    The left testicle is 2.8 x 3.6 x 4.3cm.  Left epididymal head is 15 x 20mm.  8.2 mm left epididymal head cyst is present.  Normal blood flow.  Very large hydrocele.    Extensive scrotal wall thickening or edema is present.  No abscess.                                       X-Ray Chest AP Portable (Final result)  Result time 12/09/23 23:00:15      Final result by Hunter Munoz MD (12/09/23 23:00:15)                   Impression:      No acute abnormality.      Electronically signed by: Hunter Munoz  Date:    12/09/2023  Time:    23:00               Narrative:    EXAMINATION:  XR CHEST AP PORTABLE    CLINICAL HISTORY:  Chest Pain;    TECHNIQUE:  Single frontal view of the chest was performed.    COMPARISON:  None    FINDINGS:  Low lung volumes.  Prominent cardiac silhouette.  No acute process seen    Bones are intact.                                       The EKG was ordered, reviewed, and independently interpreted by the ED provider.  Interpretation time: 22:39  Rate: 109 BPM  Rhythm:  Atrial flutter with variable A-V block  Interpretation: Right bundle branch block. Septal infarct ,age undetermined. Inferior infarct ,age undetermined. No STEMI.             The Emergency Provider reviewed the vital signs and test results, which are outlined above.     ED Discussion       1:01 AM: Discussed case with Dr. Fair ([facility]). Dr. Fair agrees with current care and management of pt and accepts admission.   Admitting Service:   Admitting Physician: Dr. Fair  Admit to: IP med/tele  1:01 AM: Re-evaluated pt. I have discussed test results, shared treatment plan, and the need for admission with patient and family at bedside. Pt and family express understanding at this time and agree with all information. All questions answered. Pt and family have no further questions or concerns at  this time. Pt is ready for admit.         Medical Decision Making  Amount and/or Complexity of Data Reviewed  Labs: ordered. Decision-making details documented in ED Course.  Radiology: ordered. Decision-making details documented in ED Course.  ECG/medicine tests: ordered. Decision-making details documented in ED Course.    Risk  Prescription drug management.  Decision regarding hospitalization.                ED Medication(s):  Medications   sodium chloride 0.9% flush 10 mL (has no administration in time range)   ondansetron injection 4 mg (has no administration in time range)   cetirizine tablet 10 mg (10 mg Oral Given 12/12/23 0914)   lisinopriL tablet 20 mg (20 mg Oral Given 12/12/23 0914)   methIMAzole tablet 5 mg (5 mg Oral Given 12/12/23 0914)   furosemide injection 60 mg (60 mg Intravenous Given 12/12/23 1024)   metoprolol succinate (TOPROL-XL) 24 hr tablet 50 mg (50 mg Oral Given 12/12/23 1024)   aspirin chewable tablet 81 mg (81 mg Oral Given 12/12/23 0914)   polyethylene glycol packet 17 g (17 g Oral Given 12/12/23 0914)   heparin 25,000 units in dextrose 5% 250 mL (100 units/mL) infusion LOW INTENSITY nomogram - [facility] (17 Units/kg/hr × 100.1 kg (Adjusted) Intravenous Verify Only 12/12/23 1809)   heparin 25,000 units in dextrose 5% (100 units/ml) IV bolus from bag - ADDITIONAL PRN BOLUS - 60 units/kg (6,000 Units Intravenous Bolus from Bag 12/11/23 0424)   heparin 25,000 units in dextrose 5% (100 units/ml) IV bolus from bag - ADDITIONAL PRN BOLUS - 30 units/kg (3,000 Units Intravenous Bolus from Bag 12/11/23 1602)   metOLazone tablet 5 mg (5 mg Oral Given 12/12/23 1130)   potassium chloride SA CR tablet 40 mEq (40 mEq Oral Given 12/12/23 1130)   magnesium oxide tablet 400 mg (400 mg Oral Given 12/12/23 1130)   senna-docusate 8.6-50 mg per tablet 2 tablet (has no administration in time range)   bisacodyL suppository 10 mg (has no administration in time range)   furosemide injection 80 mg (80 mg Intravenous  Given 12/10/23 0012)   diltiaZEM injection 10 mg (10 mg Intravenous Given 12/10/23 0047)   potassium bicarbonate disintegrating tablet 50 mEq (50 mEq Oral Given 12/11/23 1750)   potassium chloride SA CR tablet 40 mEq (40 mEq Oral Given 12/12/23 0914)   bisacodyL EC tablet 5 mg (5 mg Oral Given 12/12/23 1733)       Current Discharge Medication List                  Scribe Attestation:   Scribe #1: I performed the above scribed service and the documentation accurately describes the services I performed. I attest to the accuracy of the note.     Attending:   Physician Attestation Statement for Scribe #1: I, Floresita Tracey MD, personally performed the services described in this documentation, as scribed by Josette Guerra, in my presence, and it is both accurate and complete.           Clinical Impression       ICD-10-CM ICD-9-CM   1. Atrial flutter, unspecified type  I48.92 427.32   2. SOB (shortness of breath)  R06.02 786.05   3. Systolic congestive heart failure, unspecified HF chronicity  I50.20 428.20     428.0   4. Lower extremity edema  R60.0 782.3   5. Scrotal edema  N50.89 608.86   6. Tachypnea  R06.82 786.06   7. CHF exacerbation  I50.9 428.0   8. Atrial flutter  I48.92 427.32   9. Lymphedema  I89.0 457.1   10. Primary hypertension  I10 401.9   11. Morbid obesity with body mass index (BMI) of 40.0 to 44.9 in adult  E66.01 278.01    Z68.41 V85.41   12. Paroxysmal A-fib  I48.0 427.31   13. Typical atrial flutter  I48.3 427.32   14. Acute on chronic combined systolic and diastolic congestive heart failure  I50.43 428.43     428.0       Disposition:   Disposition: Admitted  Condition: Floresita Bailey MD  12/12/23 6722

## 2023-12-10 NOTE — SUBJECTIVE & OBJECTIVE
Review of Systems   Constitutional: Negative for diaphoresis, malaise/fatigue, weight gain and weight loss.   HENT:  Negative for congestion and nosebleeds.    Cardiovascular:  Positive for chest pain and leg swelling (BLE). Negative for claudication, cyanosis, dyspnea on exertion, irregular heartbeat, near-syncope, orthopnea, palpitations, paroxysmal nocturnal dyspnea and syncope.   Respiratory:  Positive for shortness of breath. Negative for cough, hemoptysis, sleep disturbances due to breathing, snoring, sputum production and wheezing.    Hematologic/Lymphatic: Negative for bleeding problem. Does not bruise/bleed easily.   Skin:  Negative for rash.   Musculoskeletal:  Negative for arthritis, back pain, falls, joint pain, muscle cramps and muscle weakness.   Gastrointestinal:  Positive for bloating. Negative for abdominal pain, constipation, diarrhea, heartburn, hematemesis, hematochezia, melena, nausea and vomiting.   Genitourinary:  Negative for dysuria, hematuria and nocturia.   Neurological:  Negative for excessive daytime sleepiness, dizziness, headaches, light-headedness, loss of balance, numbness, vertigo and weakness.     Objective:     Vital Signs (Most Recent):  Temp: 98.2 °F (36.8 °C) (12/10/23 1424)  Pulse: 86 (12/10/23 1424)  Resp: 20 (12/10/23 1424)  BP: 134/62 (12/10/23 1424)  SpO2: 97 % (12/10/23 1424) Vital Signs (24h Range):  Temp:  [97.9 °F (36.6 °C)-98.2 °F (36.8 °C)] 98.2 °F (36.8 °C)  Pulse:  [] 86  Resp:  [20-28] 20  SpO2:  [90 %-99 %] 97 %  BP: (100-152)/() 134/62     Weight: (!) 137.4 kg (303 lb)  Body mass index is 42.26 kg/m².     SpO2: 97 %         Intake/Output Summary (Last 24 hours) at 12/10/2023 1450  Last data filed at 12/10/2023 1323  Gross per 24 hour   Intake --   Output 650 ml   Net -650 ml       Lines/Drains/Airways       Peripheral Intravenous Line  Duration                  Peripheral IV - Single Lumen 12/10/23 0241 20 G Right Antecubital <1 day                        Physical Exam  Vitals and nursing note reviewed.   Constitutional:       Appearance: Normal appearance. He is well-developed.   HENT:      Head: Normocephalic.      Mouth/Throat:      Mouth: Mucous membranes are moist.   Neck:      Vascular: No carotid bruit or JVD.   Cardiovascular:      Rate and Rhythm: Normal rate and regular rhythm.      Pulses: Normal pulses.      Heart sounds: Normal heart sounds. No murmur heard.     No friction rub.   Pulmonary:      Effort: Pulmonary effort is normal. No respiratory distress.      Breath sounds: Rales present. No wheezing.      Comments: (+) Low volume at lung bases     Abdominal:      General: Bowel sounds are normal. There is distension.      Palpations: Abdomen is soft.      Tenderness: There is no abdominal tenderness. There is no guarding.      Comments: Abdominal swelling.   Musculoskeletal:         General: No swelling or tenderness.      Cervical back: Neck supple. No tenderness.      Right lower leg: No edema.      Left lower leg: No edema.   Skin:     General: Skin is warm and dry.      Capillary Refill: Capillary refill takes less than 2 seconds.      Findings: No rash.   Neurological:      General: No focal deficit present.      Mental Status: He is alert and oriented to person, place, and time.   Psychiatric:         Mood and Affect: Mood normal.         Behavior: Behavior normal.         Thought Content: Thought content normal.            Significant Labs: All pertinent lab results from the last 24 hours have been reviewed. and   Recent Lab Results         12/10/23  0854   12/10/23  0240   12/09/23  2302   12/09/23  2244        Albumin     3.6         ALP     79         ALT     102         Anion Gap     16         Appearance, UA       Clear       AST     72         Baso #     0.04         Basophil %     0.3         Bilirubin (UA)       Negative       BILIRUBIN TOTAL     1.6  Comment: For infants and newborns, interpretation of results should be  based  on gestational age, weight and in agreement with clinical  observations.    Premature Infant recommended reference ranges:  Up to 24 hours.............<8.0 mg/dL  Up to 48 hours............<12.0 mg/dL  3-5 days..................<15.0 mg/dL  6-29 days.................<15.0 mg/dL           BNP     164  Comment: Values of less than 100 pg/ml are consistent with non-CHF populations.         BUN     23         Calcium     9.3         Chloride     101         CO2     24         Color, UA       Yellow       Creatinine     1.3         Differential Method     Automated         eGFR     >60         Eos #     0.0         Eosinophil %     0.2         Estimated Avg Glucose   140           Free T4   1.50           Glucose     116         Glucose, UA       Negative       Gran # (ANC)     9.8         Gran %     73.9         Hematocrit     44.8         Hemoglobin     15.2         Hemoglobin A1C External   6.5  Comment: ADA Screening Guidelines:  5.7-6.4%  Consistent with prediabetes  >or=6.5%  Consistent with diabetes    High levels of fetal hemoglobin interfere with the HbA1C  assay. Heterozygous hemoglobin variants (HbS, HgC, etc)do  not significantly interfere with this assay.   However, presence of multiple variants may affect accuracy.             Immature Grans (Abs)     0.05  Comment: Mild elevation in immature granulocytes is non specific and   can be seen in a variety of conditions including stress response,   acute inflammation, trauma and pregnancy. Correlation with other   laboratory and clinical findings is essential.           Immature Granulocytes     0.4         Ketones, UA       Negative       Leukocytes, UA       Negative       Lymph #     2.1         Lymph %     15.8         Magnesium    1.9           MCH     26.0         MCHC     33.9         MCV     77         Mono #     1.2         Mono %     9.4         MPV     11.2         NITRITE UA       Negative       nRBC     0         Occult Blood UA        Negative       pH, UA       7.0       Platelet Count     217         POCT Glucose 107             Potassium     3.4         PROTEIN TOTAL     7.9         Protein, UA       Negative  Comment: Recommend a 24 hour urine protein or a urine   protein/creatinine ratio if globulin induced proteinuria is  clinically suspected.         RBC     5.84         RDW     14.8         Sodium     141         Specific [place], UA       1.010       Specimen UA       Urine, Clean Catch       Troponin I     0.031  Comment: The reference interval for Troponin I represents the 99th percentile   cutoff   for our facility and is consistent with 3rd generation assay   performance.           TSH   0.083           UROBILINOGEN UA       1.0       WBC     13.19                 Significant Imaging: Echocardiogram: 2D echo with color flow doppler: No results found for this or any previous visit. and Transthoracic echo (TTE) complete (Cupid Only):   Results for orders placed or performed during the hospital encounter of 12/09/23   Echo   Result Value Ref Range    BSA 2.62 m2    LVOT stroke volume 44.74 cm3    LVIDd 5.31 3.5 - 6.0 cm    LV Systolic Volume 81.84 mL    LV Systolic Volume Index 32.5 mL/m2    LVIDs 4.27 (A) 2.1 - 4.0 cm    LV Diastolic Volume 135.86 mL    LV Diastolic Volume Index 53.91 mL/m2    IVS 1.28 (A) 0.6 - 1.1 cm    LVOT diameter 2.07 cm    LVOT area 3.4 cm2    FS 20 (A) 28 - 44 %    Left Ventricle Relative Wall Thickness 0.49 cm    Posterior Wall 1.29 (A) 0.6 - 1.1 cm    LV mass 283.13 g    LV Mass Index 112 g/m2    MV Peak E Ishmael 1.08 m/s    TDI LATERAL 0.11 m/s    TDI SEPTAL 0.08 m/s    E/E' ratio 11.37 m/s    MV Peak A Ishmael 0.23 m/s    TR Max Ishmael 2.78 m/s    E/A ratio 4.70     IVRT 31.40 msec    E wave deceleration time 167.06 msec    LV SEPTAL E/E' RATIO 13.50 m/s    LV LATERAL E/E' RATIO 9.82 m/s    LVOT peak ishmael 0.68 m/s    Left Ventricular Outflow Tract Mean Velocity 0.44 cm/s    Left Ventricular Outflow Tract Mean Gradient  0.91 mmHg    RV mid diameter 4.02 cm    RVOT peak VTI 9.8 cm    TAPSE 1.70 cm    LA size 5.35 cm    Left Atrium Major Axis 6.56 cm    RA Major Axis 5.76 cm    AV mean gradient 3 mmHg    AV peak gradient 4 mmHg    Ao peak ria 1.04 m/s    Ao VTI 17.90 cm    LVOT peak VTI 13.30 cm    AV valve area 2.50 cm²    AV Velocity Ratio 0.65     AV index (prosthetic) 0.74     MARILYN by Velocity Ratio 2.20 cm²    Mr max ria 4.52 m/s    MV stenosis pressure 1/2 time 48.45 ms    MV valve area p 1/2 method 4.54 cm2    Triscuspid Valve Regurgitation Peak Gradient 31 mmHg    PV mean gradient 1 mmHg    PV peak gradient 1     RVOT peak ria 0.56 m/s    Ao root annulus 3.22 cm    STJ 3.20 cm    Ascending aorta 3.27 cm    IVC diameter 1.93 cm    Mean e' 0.10 m/s    ZLVIDS -5.62     ZLVIDD -10.17     LA WIDTH 4.7 cm    RA Width 4.4 cm    and EKG:   Results for orders placed or performed during the hospital encounter of 12/09/23   EKG 12-lead    Narrative    Test Reason : R06.02,    Vent. Rate : 109 BPM     Atrial Rate : 256 BPM     P-R Int : 000 ms          QRS Dur : 136 ms      QT Int : 412 ms       P-R-T Axes : 000 -02 016 degrees     QTc Int : 554 ms    Atrial flutter with variable A-V block  Right bundle branch block  Septal infarct ,age undetermined  Inferior infarct ,age undetermined  Abnormal ECG  When compared with ECG of 04-MAR-2022 15:43,  Atrial flutter has replaced Sinus rhythm  Septal infarct is now Present  QT has lengthened    Referred By: [email or web address]   SELF           Confirmed By:      Results for orders placed during the hospital encounter of 12/09/23    Echo    Interpretation Summary    Left Ventricle: The left ventricle is normal in size. Normal wall thickness. There is concentric remodeling. Mild global hyperkinesis present. There is reduced systolic function. Ejection fraction by visual approximation is 40%. There is normal diastolic function.    Right Ventricle: Moderate right ventricular enlargement. Wall thickness is  normal. Systolic function is moderately reduced.    Mitral Valve: There is mild regurgitation.    Tricuspid Valve: There is moderate regurgitation.    Pulmonary Artery: The estimated pulmonary artery systolic pressure is 39 mmHg.    IVC/SVC: Intermediate venous pressure at 8 mmHg.

## 2023-12-10 NOTE — ASSESSMENT & PLAN NOTE
Body mass index is 42.26 kg/m². Morbid obesity complicates all aspects of disease management from diagnostic modalities to treatment. Weight loss encouraged and health benefits explained to patient.

## 2023-12-10 NOTE — ASSESSMENT & PLAN NOTE
Patient has chronic hyperthyroidism. TFTs reviewed-   Lab Results   Component Value Date    TSH 0.205 (L) 11/09/2023   Plan:  -Will continue chronic methimazole and adjust for and clinical changes

## 2023-12-10 NOTE — ASSESSMENT & PLAN NOTE
Patient is identified as having Diastolic (HFpEF) heart failure that is Acute on chronic. CHF is currently uncontrolled due to Continued edema of extremities and Dyspnea not returned to baseline after single doses of IV diuretic. Latest ECHO performed and demonstrates- Results for orders placed during the hospital encounter of 03/08/22    Echo    Interpretation Summary  · The left ventricle is normal in size with concentric hypertrophy and normal systolic function.  · Mild left atrial enlargement.  · Indeterminate left ventricular diastolic function.  · The estimated PA systolic pressure is 18 mmHg.  · Normal right ventricular size with normal right ventricular systolic function.  · Intermediate central venous pressure (8 mmHg).  · The estimated ejection fraction is 65%.  · Mild pulmonic regurgitation.  · Mild mitral regurgitation.  Continue Beta Blocker, ACE/ARB, and Furosemide and monitor clinical status closely. Monitor on telemetry. Patient is on CHF pathway.  Monitor strict Is&Os and daily weights.  Place on fluid restriction of 1.5 L. Cardiology has been consulted. Continue to stress to patient importance of self efficacy and  on diet for CHF. Last BNP reviewed- and noted below   Recent Labs   Lab 12/09/23  2302   *     Plan:  -telemetry  -continue home medications  -bipap qhs prn  -titrate oxygen therapy as needed   -continue treatment of CHF as noted above  -f/u echo and scrotal US  -f/u cardiology

## 2023-12-10 NOTE — HPI
Roland Ng is a 68 y.o. male with a PMH  has a past medical history of CAD (coronary artery disease), Chronic congestive heart failure (01/13/2021), Diabetes mellitus with no complication, Hypertension associated with diabetes, Hyperthyroidism (01/13/2021), Morbid obesity with body mass index (BMI) of 40.0 to 44.9 in adult, and Paroxysmal A-fib. who presented to the ED for further evaluation of progressively worsening dyspnea/shortness a breath was well as bilateral lower extremity, scrotal, and penile swelling/edema.  Patient reports significant history of heart failure and was previously followed by Dr. Decker but stated he is not seen him in quite some time.  Patient also reported being off his home medications for approximately 3-4 months prior to getting back on them due to insurance related issues and has been compliant over the past 3 months.  Patient also reports non adherence to fluid/dietary restrictions and states he drinks approximately 20 bottles of water daily.  He reported no known alleviating or aggravating factors noted and reported associated symptoms included dyspnea both at rest and on exertion, orthopnea, PND, and wheezing in addition to reported swelling as noted above.  Patient reports negative history of asthma/COPD, does not use home oxygen, and does not use home CPAP.  All other review of systems negative except as noted above.  Initial workup in the ED concerning for signs/symptoms of acute CHF exacerbation with patient being admitted to Hospital Medicine inpatient for continued medical management.      PCP: Janis Green

## 2023-12-10 NOTE — ASSESSMENT & PLAN NOTE
Rate controlled with toprol xl 50 bid  Once euvolemic, if still symptomatic with SOB, will consider ZOE/Cardioversion vs EP evaluation for flutter/fib ablation

## 2023-12-10 NOTE — H&P
[facility] - Emergency Dept.  [facility] Medicine  History & Physical    Patient Name: Roland Ng  MRN: [id number]  Patient Class: IP- Inpatient  Admission Date: 12/9/2023  Attending Physician: Tommy Fair MD   Primary Care Provider: Janis Green NP         Patient information was obtained from patient, past medical records, and ER records.     Subjective:     Principal Problem:Dyspnea    Chief Complaint:   Chief Complaint   Patient presents with    Shortness of Breath        HPI: Roland Ng is a 68 y.o. male with a PMH  has a past medical history of CAD (coronary artery disease), Chronic congestive heart failure (01/13/2021), Diabetes mellitus with no complication, Hypertension associated with diabetes, Hyperthyroidism (01/13/2021), Morbid obesity with body mass index (BMI) of 40.0 to 44.9 in adult, and Paroxysmal A-fib. who presented to the ED for further evaluation of progressively worsening dyspnea/shortness a breath was well as bilateral lower extremity, scrotal, and penile swelling/edema.  Patient reports significant history of heart failure and was previously followed by Dr. Decker but stated he is not seen him in quite some time.  Patient also reported being off his home medications for approximately 3-4 months prior to getting back on them due to insurance related issues and has been compliant over the past 3 months.  Patient also reports non adherence to fluid/dietary restrictions and states he drinks approximately 20 bottles of water daily.  He reported no known alleviating or aggravating factors noted and reported associated symptoms included dyspnea both at rest and on exertion, orthopnea, PND, and wheezing in addition to reported swelling as noted above.  Patient reports negative history of asthma/COPD, does not use home oxygen, and does not use home CPAP.  All other review of systems negative except as noted above.  Initial workup in the ED concerning for signs/symptoms  of acute CHF exacerbation with patient being admitted to Hospital Medicine inpatient for continued medical management.      PCP: Janis Green      Past Medical History:   Diagnosis Date    CAD (coronary artery disease)     had MI 2014    Chronic congestive heart failure 01/13/2021    Diabetes mellitus with no complication     Hypertension associated with diabetes     Hyperthyroidism 01/13/2021    Morbid obesity with body mass index (BMI) of 40.0 to 44.9 in adult     Paroxysmal A-fib        Past Surgical History:   Procedure Laterality Date    APPENDECTOMY      Twice       Review of patient's allergies indicates:  No Known Allergies    No current facility-administered medications on file prior to encounter.     Current Outpatient Medications on File Prior to Encounter   Medication Sig    amLODIPine (NORVASC) 10 MG tablet Take 1 tablet (10 mg total) by mouth once daily.    apixaban (ELIQUIS) 5 mg Tab Take 1 tablet (5 mg total) by mouth 2 (two) times daily.    cetirizine (ZYRTEC) 10 MG tablet Take 1 tablet (10 mg total) by mouth once daily.    furosemide (LASIX) 40 MG tablet Take 1 tablet (40 mg total) by mouth once daily.    lisinopriL (PRINIVIL,ZESTRIL) 20 MG tablet Take 1 tablet (20 mg total) by mouth once daily.    metFORMIN (GLUCOPHAGE) 500 MG tablet Take 1 tablet (500 mg total) by mouth 2 (two) times daily with meals.    methIMAzole (TAPAZOLE) 5 MG Tab Take 1 tablet (5 mg total) by mouth once daily.    metoprolol succinate (TOPROL-XL) 50 MG 24 hr tablet Take 1 tablet (50 mg total) by mouth once daily.     Family History       Problem Relation (Age of Onset)    Cataracts Paternal Grandmother    Glaucoma Paternal Grandmother    Hypertension Mother, Father    Uterine cancer Mother          Tobacco Use    Smoking status: Never     Passive exposure: Never    Smokeless tobacco: Never   Substance and Sexual Activity    Alcohol use: Not Currently    Drug use: Never    Sexual activity: Yes     Partners: Female      Review of Systems   All other systems reviewed and are negative.    Objective:     Vital Signs (Most Recent):  Temp: 97.9 °F (36.6 °C) (12/09/23 2204)  Pulse: (S) (!) 153 (12/10/23 0015)  Resp: (!) 22 (12/10/23 0000)  BP: 124/71 (12/10/23 0047)  SpO2: 99 % (12/10/23 0000) Vital Signs (24h Range):  Temp:  [97.9 °F (36.6 °C)] 97.9 °F (36.6 °C)  Pulse:  [] 153  Resp:  [22-24] 22  SpO2:  [95 %-99 %] 99 %  BP: (121-152)/() 124/71     Weight: (!) 137.4 kg (303 lb)  Body mass index is 42.26 kg/m².     Physical Exam  Vitals reviewed.   Constitutional:       General: He is not in acute distress.     Appearance: He is obese. He is not ill-appearing, toxic-appearing or diaphoretic.   HENT:      Head: Normocephalic and atraumatic.      Right Ear: External ear normal.      Left Ear: External ear normal.      Nose: Nose normal. No congestion or rhinorrhea.      Mouth/Throat:      Mouth: Mucous membranes are moist.      Pharynx: Oropharynx is clear. No oropharyngeal exudate or posterior oropharyngeal erythema.   Eyes:      General: No scleral icterus.     Extraocular Movements: Extraocular movements intact.      Conjunctiva/sclera: Conjunctivae normal.      Pupils: Pupils are equal, round, and reactive to light.   Neck:      Vascular: No carotid bruit.   Cardiovascular:      Rate and Rhythm: Tachycardia present. Rhythm irregular.      Pulses: Normal pulses.      Heart sounds: Normal heart sounds. No murmur heard.     No friction rub. No gallop.   Pulmonary:      Effort: Pulmonary effort is normal. No respiratory distress.      Breath sounds: Normal breath sounds. No stridor. No wheezing, rhonchi or rales.   Chest:      Chest wall: No tenderness.   Abdominal:      General: Bowel sounds are normal. There is no distension.      Palpations: There is no mass.      Tenderness: There is no abdominal tenderness. There is no guarding or rebound.      Hernia: No hernia is present.      Comments: Obese abdomen without any  palpable tenderness or masses noted   Genitourinary:     Comments: Diffuse penile and scrotal swelling/edema noted with mild TTP of scrotum  Musculoskeletal:         General: Swelling present. No tenderness, deformity or signs of injury. Normal range of motion.      Cervical back: Normal range of motion and neck supple. No rigidity or tenderness.      Right lower leg: Edema present.      Left lower leg: Edema present.   Lymphadenopathy:      Cervical: No cervical adenopathy.   Skin:     General: Skin is warm and dry.      Capillary Refill: Capillary refill takes less than 2 seconds.      Coloration: Skin is not jaundiced or pale.      Findings: No bruising, erythema, lesion or rash.      Comments: Bilateral chronic venous stasis noted   Neurological:      General: No focal deficit present.      Mental Status: He is alert and oriented to person, place, and time. Mental status is at baseline.      Cranial Nerves: No cranial nerve deficit.      Sensory: No sensory deficit.      Motor: No weakness.      Coordination: Coordination normal.   Psychiatric:         Mood and Affect: Mood normal.         Behavior: Behavior normal.         Thought Content: Thought content normal.         Judgment: Judgment normal.              CRANIAL NERVES     CN III, IV, VI   Pupils are equal, round, and reactive to light.       Significant Labs: All pertinent labs within the past 24 hours have been reviewed.    Significant Imaging: I have reviewed all pertinent imaging results/findings within the past 24 hours.    LABS:  Recent Results (from the past 24 hour(s))   Urinalysis - Clean Catch    Collection Time: 12/09/23 10:44 PM   Result Value Ref Range    Specimen UA Urine, Clean Catch     Color, UA Yellow Yellow, Straw, Leigh    Appearance, UA Clear Clear    pH, UA 7.0 5.0 - 8.0    Specific Gravity, UA 1.010 1.005 - 1.030    Protein, UA Negative Negative    Glucose, UA Negative Negative    Ketones, UA Negative Negative    Bilirubin (UA)  Negative Negative    Occult Blood UA Negative Negative    Nitrite, UA Negative Negative    Urobilinogen, UA 1.0 <2.0 EU/dL    Leukocytes, UA Negative Negative   CBC auto differential    Collection Time: 12/09/23 11:02 PM   Result Value Ref Range    WBC 13.19 (H) 3.90 - 12.70 K/uL    RBC 5.84 4.60 - 6.20 M/uL    Hemoglobin 15.2 14.0 - 18.0 g/dL    Hematocrit 44.8 40.0 - 54.0 %    MCV 77 (L) 82 - 98 fL    MCH 26.0 (L) 27.0 - 31.0 pg    MCHC 33.9 32.0 - 36.0 g/dL    RDW 14.8 (H) 11.5 - 14.5 %    Platelets 217 150 - 450 K/uL    MPV 11.2 9.2 - 12.9 fL    Immature Granulocytes 0.4 0.0 - 0.5 %    Gran # (ANC) 9.8 (H) 1.8 - 7.7 K/uL    Immature Grans (Abs) 0.05 (H) 0.00 - 0.04 K/uL    Lymph # 2.1 1.0 - 4.8 K/uL    Mono # 1.2 (H) 0.3 - 1.0 K/uL    Eos # 0.0 0.0 - 0.5 K/uL    Baso # 0.04 0.00 - 0.20 K/uL    nRBC 0 0 /100 WBC    Gran % 73.9 (H) 38.0 - 73.0 %    Lymph % 15.8 (L) 18.0 - 48.0 %    Mono % 9.4 4.0 - 15.0 %    Eosinophil % 0.2 0.0 - 8.0 %    Basophil % 0.3 0.0 - 1.9 %    Differential Method Automated    Comprehensive metabolic panel    Collection Time: 12/09/23 11:02 PM   Result Value Ref Range    Sodium 141 136 - 145 mmol/L    Potassium 3.4 (L) 3.5 - 5.1 mmol/L    Chloride 101 95 - 110 mmol/L    CO2 24 23 - 29 mmol/L    Glucose 116 (H) 70 - 110 mg/dL    BUN 23 8 - 23 mg/dL    Creatinine 1.3 0.5 - 1.4 mg/dL    Calcium 9.3 8.7 - 10.5 mg/dL    Total Protein 7.9 6.0 - 8.4 g/dL    Albumin 3.6 3.5 - 5.2 g/dL    Total Bilirubin 1.6 (H) 0.1 - 1.0 mg/dL    Alkaline Phosphatase 79 55 - 135 U/L    AST 72 (H) 10 - 40 U/L     (H) 10 - 44 U/L    eGFR >60 >60 mL/min/1.73 m^2    Anion Gap 16 8 - 16 mmol/L   B-Type natriuretic peptide (BNP)    Collection Time: 12/09/23 11:02 PM   Result Value Ref Range     (H) 0 - 99 pg/mL   Troponin I    Collection Time: 12/09/23 11:02 PM   Result Value Ref Range    Troponin I 0.031 (H) 0.000 - 0.026 ng/mL       RADIOLOGY  X-Ray Chest AP Portable    Result Date:  12/9/2023  EXAMINATION: XR CHEST AP PORTABLE CLINICAL HISTORY: Chest Pain; TECHNIQUE: Single frontal view of the chest was performed. COMPARISON: None FINDINGS: Low lung volumes.  Prominent cardiac silhouette.  No acute process seen Bones are intact.     No acute abnormality. Electronically signed by: Hunter Munoz Date:    12/09/2023 Time:    23:00    X-Ray Chest 1 View    Result Date: 12/5/2023  XR CHEST 1 VIEW Indication: SOB Comparison: None Findings: Interstitial perihilar peribronchial thickening. No pleural effusion or pneumothorax. Mild cardiac silhouette enlargement. Redistribution pulmonary vasculature. No gross bony deformity.    1. CHF      EKG    MICROBIOLOGY    MDM    Assessment/Plan:     * Dyspnea    Scrotal swelling    Bilateral lower extremity edema    Acute exacerbation of CHF (congestive heart failure)  Patient is identified as having Diastolic (HFpEF) heart failure that is Acute on chronic. CHF is currently uncontrolled due to Continued edema of extremities and Dyspnea not returned to baseline after single doses of IV diuretic. Latest ECHO performed and demonstrates- Results for orders placed during the hospital encounter of 03/08/22    Echo    Interpretation Summary  · The left ventricle is normal in size with concentric hypertrophy and normal systolic function.  · Mild left atrial enlargement.  · Indeterminate left ventricular diastolic function.  · The estimated PA systolic pressure is 18 mmHg.  · Normal right ventricular size with normal right ventricular systolic function.  · Intermediate central venous pressure (8 mmHg).  · The estimated ejection fraction is 65%.  · Mild pulmonic regurgitation.  · Mild mitral regurgitation.  Continue Beta Blocker, ACE/ARB, and Furosemide and monitor clinical status closely. Monitor on telemetry. Patient is on CHF pathway.  Monitor strict Is&Os and daily weights.  Place on fluid restriction of 1.5 L. Cardiology has been consulted. Continue to stress to  patient importance of self efficacy and  on diet for CHF. Last BNP reviewed- and noted below   Recent Labs   Lab 12/09/23  2302   *     Plan:  -telemetry  -continue home medications  -bipap qhs prn  -titrate oxygen therapy as needed   -continue treatment of CHF as noted above  -f/u echo and scrotal US  -f/u cardiology      Paroxysmal A-fib  Patient with Paroxysmal (<7 days) atrial fibrillation which is uncontrolled currently with Beta Blocker and Calcium Channel Blocker. Patient is currently in atrial fibrillation.[id number] Score: 3. Anticoagulation indicated. Anticoagulation done with Eliquis .      Atherosclerosis of native coronary artery of native heart without angina pectoris  Patient with known CAD s/p  LHC without stent placement , which is controlled Will continue  Eliquis, ASA, and Statin and monitor for S/Sx of angina/ACS. Continue to monitor on telemetry.       Hypertension  Chronic, controlled. Latest blood pressure and vitals reviewed-     Temp:  [97.9 °F (36.6 °C)]   Pulse:  []   Resp:  [20-28]   BP: (102-152)/()   SpO2:  [90 %-99 %] .   Home meds for hypertension were reviewed and noted below.   Hypertension Medications               amLODIPine (NORVASC) 10 MG tablet Take 1 tablet (10 mg total) by mouth once daily.    furosemide (LASIX) 40 MG tablet Take 1 tablet (40 mg total) by mouth once daily.    lisinopriL (PRINIVIL,ZESTRIL) 20 MG tablet Take 1 tablet (20 mg total) by mouth once daily.    metoprolol succinate (TOPROL-XL) 50 MG 24 hr tablet Take 1 tablet (50 mg total) by mouth once daily.          While in the hospital, will manage blood pressure as follows; Continue home antihypertensive regimen    Will utilize p.r.n. blood pressure medication only if patient's blood pressure greater than 160/100 and he develops symptoms such as worsening chest pain or shortness of breath.      Hyperthyroidism  Patient has chronic hyperthyroidism. TFTs reviewed-   Lab Results    Component Value Date    TSH 0.205 (L) 11/09/2023   Plan:  -Will continue chronic methimazole and adjust for and clinical changes      Morbid obesity with body mass index (BMI) of 40.0 to 44.9 in adult  Body mass index is 42.26 kg/m². Morbid obesity complicates all aspects of disease management from diagnostic modalities to treatment. Weight loss encouraged and health benefits explained to patient.         VTE Risk Mitigation (From admission, onward)           Ordered     apixaban tablet 5 mg  2 times daily         12/10/23 0449     Reason for No Pharmacological VTE Prophylaxis  Once        Question:  Reasons:  Answer:  Already adequately anticoagulated on oral Anticoagulants    12/10/23 0102     IP VTE HIGH RISK PATIENT  Once         12/10/23 0102     Place sequential compression device  Until discontinued         12/10/23 0102                  //Core Measures   -DVT proph: SCDs, Eliquis   -Code status: Full    -Surrogate: none provided       Components of this note were documented using a voice recognition system and are subject to errors not corrected at the time the document was proof read. Please contact the author for any clarifications.        Tommy Fair MD  Department of Hospital Medicine  '[place] - Emergency Dept.

## 2023-12-11 ENCOUNTER — DOCUMENTATION ONLY (OUTPATIENT)
Dept: CARDIOLOGY | Facility: CLINIC | Age: 68
End: 2023-12-11
Payer: COMMERCIAL

## 2023-12-11 PROBLEM — I50.20 SYSTOLIC CONGESTIVE HEART FAILURE: Status: ACTIVE | Noted: 2023-12-11

## 2023-12-11 PROBLEM — R06.02 SOB (SHORTNESS OF BREATH): Status: ACTIVE | Noted: 2023-12-10

## 2023-12-11 LAB
ANION GAP SERPL CALC-SCNC: 13 MMOL/L (ref 8–16)
APTT PPP: 31.4 SEC (ref 21–32)
APTT PPP: 34.3 SEC (ref 21–32)
APTT PPP: 43.9 SEC (ref 21–32)
APTT PPP: 49.4 SEC (ref 21–32)
BASOPHILS # BLD AUTO: 0.02 K/UL (ref 0–0.2)
BASOPHILS NFR BLD: 0.2 % (ref 0–1.9)
BUN SERPL-MCNC: 22 MG/DL (ref 8–23)
CALCIUM SERPL-MCNC: 8.3 MG/DL (ref 8.7–10.5)
CHLORIDE SERPL-SCNC: 104 MMOL/L (ref 95–110)
CO2 SERPL-SCNC: 25 MMOL/L (ref 23–29)
CREAT SERPL-MCNC: 1.1 MG/DL (ref 0.5–1.4)
DIFFERENTIAL METHOD BLD: ABNORMAL
EOSINOPHIL # BLD AUTO: 0.1 K/UL (ref 0–0.5)
EOSINOPHIL NFR BLD: 0.8 % (ref 0–8)
ERYTHROCYTE [DISTWIDTH] IN BLOOD BY AUTOMATED COUNT: 15.2 % (ref 11.5–14.5)
EST. GFR  (NO RACE VARIABLE): >60 ML/MIN/1.73 M^2
GLUCOSE SERPL-MCNC: 124 MG/DL (ref 70–110)
HCT VFR BLD AUTO: 41.7 % (ref 40–54)
HGB BLD-MCNC: 13.8 G/DL (ref 14–18)
IMM GRANULOCYTES # BLD AUTO: 0.04 K/UL (ref 0–0.04)
IMM GRANULOCYTES NFR BLD AUTO: 0.4 % (ref 0–0.5)
LYMPHOCYTES # BLD AUTO: 2.1 K/UL (ref 1–4.8)
LYMPHOCYTES NFR BLD: 18.8 % (ref 18–48)
MAGNESIUM SERPL-MCNC: 2 MG/DL (ref 1.6–2.6)
MCH RBC QN AUTO: 25.6 PG (ref 27–31)
MCHC RBC AUTO-ENTMCNC: 33.1 G/DL (ref 32–36)
MCV RBC AUTO: 77 FL (ref 82–98)
MONOCYTES # BLD AUTO: 1.2 K/UL (ref 0.3–1)
MONOCYTES NFR BLD: 10.7 % (ref 4–15)
NEUTROPHILS # BLD AUTO: 7.7 K/UL (ref 1.8–7.7)
NEUTROPHILS NFR BLD: 69.1 % (ref 38–73)
NRBC BLD-RTO: 0 /100 WBC
PLATELET # BLD AUTO: 177 K/UL (ref 150–450)
PMV BLD AUTO: 11.4 FL (ref 9.2–12.9)
POTASSIUM SERPL-SCNC: 3.4 MMOL/L (ref 3.5–5.1)
RBC # BLD AUTO: 5.39 M/UL (ref 4.6–6.2)
SODIUM SERPL-SCNC: 142 MMOL/L (ref 136–145)
WBC # BLD AUTO: 11.15 K/UL (ref 3.9–12.7)

## 2023-12-11 PROCEDURE — 25000003 PHARM REV CODE 250: Performed by: STUDENT IN AN ORGANIZED HEALTH CARE EDUCATION/TRAINING PROGRAM

## 2023-12-11 PROCEDURE — 97162 PT EVAL MOD COMPLEX 30 MIN: CPT

## 2023-12-11 PROCEDURE — 85025 COMPLETE CBC W/AUTO DIFF WBC: CPT | Performed by: INTERNAL MEDICINE

## 2023-12-11 PROCEDURE — 97530 THERAPEUTIC ACTIVITIES: CPT

## 2023-12-11 PROCEDURE — 99900035 HC TECH TIME PER 15 MIN (STAT)

## 2023-12-11 PROCEDURE — 93005 ELECTROCARDIOGRAM TRACING: CPT

## 2023-12-11 PROCEDURE — 63600175 PHARM REV CODE 636 W HCPCS: Performed by: STUDENT IN AN ORGANIZED HEALTH CARE EDUCATION/TRAINING PROGRAM

## 2023-12-11 PROCEDURE — 36415 COLL VENOUS BLD VENIPUNCTURE: CPT | Performed by: INTERNAL MEDICINE

## 2023-12-11 PROCEDURE — 99232 SBSQ HOSP IP/OBS MODERATE 35: CPT | Mod: ,,, | Performed by: PHYSICIAN ASSISTANT

## 2023-12-11 PROCEDURE — 25000003 PHARM REV CODE 250: Performed by: INTERNAL MEDICINE

## 2023-12-11 PROCEDURE — 94618 PULMONARY STRESS TESTING: CPT

## 2023-12-11 PROCEDURE — 11000001 HC ACUTE MED/SURG PRIVATE ROOM

## 2023-12-11 PROCEDURE — 80048 BASIC METABOLIC PNL TOTAL CA: CPT | Performed by: HOSPITALIST

## 2023-12-11 PROCEDURE — 93010 ELECTROCARDIOGRAM REPORT: CPT | Mod: ,,, | Performed by: INTERNAL MEDICINE

## 2023-12-11 PROCEDURE — 85730 THROMBOPLASTIN TIME PARTIAL: CPT | Mod: 91 | Performed by: INTERNAL MEDICINE

## 2023-12-11 PROCEDURE — 83735 ASSAY OF MAGNESIUM: CPT | Performed by: INTERNAL MEDICINE

## 2023-12-11 PROCEDURE — 85730 THROMBOPLASTIN TIME PARTIAL: CPT | Performed by: INTERNAL MEDICINE

## 2023-12-11 PROCEDURE — 97166 OT EVAL MOD COMPLEX 45 MIN: CPT

## 2023-12-11 PROCEDURE — 25000003 PHARM REV CODE 250: Performed by: HOSPITALIST

## 2023-12-11 RX ADMIN — METOPROLOL SUCCINATE 50 MG: 50 TABLET, EXTENDED RELEASE ORAL at 09:12

## 2023-12-11 RX ADMIN — HEPARIN SODIUM 15 UNITS/KG/HR: 10000 INJECTION, SOLUTION INTRAVENOUS at 02:12

## 2023-12-11 RX ADMIN — POTASSIUM BICARBONATE 50 MEQ: 978 TABLET, EFFERVESCENT ORAL at 05:12

## 2023-12-11 RX ADMIN — ASPIRIN 81 MG CHEWABLE TABLET 81 MG: 81 TABLET CHEWABLE at 09:12

## 2023-12-11 RX ADMIN — CETIRIZINE HYDROCHLORIDE 10 MG: 10 TABLET, FILM COATED ORAL at 09:12

## 2023-12-11 RX ADMIN — METHIMAZOLE 5 MG: 5 TABLET ORAL at 09:12

## 2023-12-11 RX ADMIN — FUROSEMIDE 60 MG: 10 INJECTION, SOLUTION INTRAMUSCULAR; INTRAVENOUS at 10:12

## 2023-12-11 RX ADMIN — LISINOPRIL 20 MG: 20 TABLET ORAL at 09:12

## 2023-12-11 NOTE — ASSESSMENT & PLAN NOTE
New onset CHF with EF 40%  Needs ischemia evaluation once euvolemic  Monitor I/Os  Continue IV diuresis     12/11/23  -Still overloaded  -Continue IV diuresis  -Continue ACEi, BB  -Strict I's/O's  -Ischemic workup once volume status improved

## 2023-12-11 NOTE — CONSULTS
"Food & Nutrition Education    Diet Education: Heart Failure  Time Spent: 15 minutes.    Learners: Pt    Nutrition Education provided with handouts:  Healthy-Heart Nutrition Therapy, Fluid-Restricted Diet, Low-Sodium Nutrition Therapy ([email or web address])    Comments:  69 y/o  male with significant medical history of CHF and atrial fibrillation presented  to ED with worsening constant SOB x 1 month. Pt  reports sleeping with four pillows and waking up with SOB in the night.  Associated sxs include  intermittent sharp chest pain,  dizziness,  abdominal swelling and BLE  swelling. Pt states he is on a fluid  pill he takes BID since BLE  swelling and  venous stasis onset in 2016. Pt states abdominal swelling is new. Pt was seen in [facility]  ED on 12/523 for SOB and was instructed  to double Lasix dose. Dietitian consulted to provide heart failure and healthy heart nutrition education    Dietitian educated patient on low sodium diet and fluid restriction related to hospital diagnosis. Discussed the importance of limiting sodium to 2,000 mg per day and reading food labels to avoid further complications of CHF. Discussed using salt free seasonings (Mrs. Jefry and [employer]ave[employer]'s Chachere "No Salt") and other herbs and spices in meals to enhance flavor without additional sodium. Discussed 1500 ml fluid restriction per MD and dietary sources of fluid. Dietitian recommended using a cup with measurements for fluids and to try to consume small sips spread throughout the day rather than a lot at one time.    Discussed dietary sources of cholesterol, the importance of incorporating healthy fats into the diet, and avoiding saturated and trans fats for heart health. For a generally healthy diet, aim for total fat less than 25-35% of calories. Discussed alternative vegetable protein options for some of her meal throughout the week (Beans, peas, and lentils)      Discussed the importance of fiber (especially soluble fiber), dietary sources, " and a goal intake of >20-30g/day.    The pt attempted to remain engaged throughout entire nutrition education. However, pt begun going in and out of sleep and had to be re-focused back to education multiple times. The pt states he has followed fluid restrictions prior to today however, states is not sure of specific amount he was recommended. Discussed the 1500 mL restriction the pt is currently recommended. Pt states he usually consumes 4 ounces of fluid at a time to ensure he does not consume excess amounts. Pt states that he consumes majority of meals from outside sources. Discussed the importance of preparing meals at home so he is able to better control the nutrient content of meals. Pt will likely require additional nutrition education. Pt had no additional questions nor concerns. Dietitian will continue to monitor.    NFPE not performed, pt appears well nourished.    All questions and concerns answered.    Provided handout with dietitian's contact information.    *Please re-consult as needed.    Thank You!   Wm Suárez, Registration Eligible, Provisional LDN

## 2023-12-11 NOTE — SUBJECTIVE & OBJECTIVE
Review of Systems   Constitutional: Positive for malaise/fatigue.   HENT: Negative.     Eyes: Negative.    Cardiovascular:  Positive for dyspnea on exertion and leg swelling.   Respiratory:  Positive for shortness of breath.    Endocrine: Negative.    Hematologic/Lymphatic: Negative.    Skin: Negative.    Musculoskeletal: Negative.    Gastrointestinal: Negative.    Genitourinary: Negative.    Neurological: Negative.    Psychiatric/Behavioral: Negative.     Allergic/Immunologic: Negative.      Objective:     Vital Signs (Most Recent):  Temp: 98 °F (36.7 °C) (12/11/23 1212)  Pulse: 74 (12/11/23 1212)  Resp: 13 (12/11/23 1212)  BP: 98/62 (12/11/23 1212)  SpO2: 95 % (12/11/23 1212) Vital Signs (24h Range):  Temp:  [97.7 °F (36.5 °C)-98.3 °F (36.8 °C)] 98 °F (36.7 °C)  Pulse:  [63-90] 74  Resp:  [13-20] 13  SpO2:  [94 %-98 %] 95 %  BP: ()/(62-77) 98/62     Weight: (!) 137.4 kg (303 lb)  Body mass index is 42.26 kg/m².     SpO2: 95 %         Intake/Output Summary (Last 24 hours) at 12/11/2023 1418  Last data filed at 12/11/2023 1212  Gross per 24 hour   Intake 0.58 ml   Output 2575 ml   Net -2574.42 ml       Lines/Drains/Airways       Peripheral Intravenous Line  Duration                  Peripheral IV - Single Lumen 12/11/23 0120 20 G Anterior;Left Forearm <1 day                       Physical Exam  Vitals and nursing note reviewed.   Constitutional:       General: He is not in acute distress.     Appearance: He is well-developed. He is obese. He is not diaphoretic.   HENT:      Head: Normocephalic and atraumatic.   Eyes:      General:         Right eye: No discharge.         Left eye: No discharge.      Pupils: Pupils are equal, round, and reactive to light.   Neck:      Vascular: JVD present.   Cardiovascular:      Rate and Rhythm: Normal rate. Rhythm irregularly irregular.      Heart sounds: Normal heart sounds, S1 normal and S2 normal. No murmur heard.  Pulmonary:      Effort: Pulmonary effort is normal. No  respiratory distress.      Breath sounds: Rales present. No wheezing.   Abdominal:      General: There is no distension.   Musculoskeletal:      Right lower leg: Edema present.      Left lower leg: Edema present.   Skin:     General: Skin is warm and dry.      Findings: No erythema.   Neurological:      Mental Status: He is alert and oriented to person, place, and time.   Psychiatric:         Mood and Affect: Mood normal.         Behavior: Behavior normal.         Thought Content: Thought content normal.            Significant Labs: CMP   Recent Labs   Lab 12/09/23 2302 12/11/23 0310    142   K 3.4* 3.4*    104   CO2 24 25   * 124*   BUN 23 22   CREATININE 1.3 1.1   CALCIUM 9.3 8.3*   PROT 7.9  --    ALBUMIN 3.6  --    BILITOT 1.6*  --    ALKPHOS 79  --    AST 72*  --    *  --    ANIONGAP 16 13   , CBC   Recent Labs   Lab 12/09/23 2302 12/11/23 0310   WBC 13.19* 11.15   HGB 15.2 13.8*   HCT 44.8 41.7    177   , Troponin   Recent Labs   Lab 12/09/23 2302   TROPONINI 0.031*   , and All pertinent lab results from the last 24 hours have been reviewed.    Significant Imaging: Echocardiogram: Transthoracic echo (TTE) complete (Cupid Only):   Results for orders placed or performed during the hospital encounter of 12/09/23   Echo   Result Value Ref Range    BSA 2.62 m2    LVOT stroke volume 44.74 cm3    LVIDd 5.31 3.5 - 6.0 cm    LV Systolic Volume 81.84 mL    LV Systolic Volume Index 32.5 mL/m2    LVIDs 4.27 (A) 2.1 - 4.0 cm    LV Diastolic Volume 135.86 mL    LV Diastolic Volume Index 53.91 mL/m2    IVS 1.28 (A) 0.6 - 1.1 cm    LVOT diameter 2.07 cm    LVOT area 3.4 cm2    FS 20 (A) 28 - 44 %    Left Ventricle Relative Wall Thickness 0.49 cm    Posterior Wall 1.29 (A) 0.6 - 1.1 cm    LV mass 283.13 g    LV Mass Index 112 g/m2    MV Peak E Ishmael 1.08 m/s    TDI LATERAL 0.11 m/s    TDI SEPTAL 0.08 m/s    E/E' ratio 11.37 m/s    MV Peak A Ishmael 0.23 m/s    TR Max Ishmael 2.78 m/s    E/A ratio  4.70     IVRT 31.40 msec    E wave deceleration time 167.06 msec    LV SEPTAL E/E' RATIO 13.50 m/s    LV LATERAL E/E' RATIO 9.82 m/s    LVOT peak ria 0.68 m/s    Left Ventricular Outflow Tract Mean Velocity 0.44 cm/s    Left Ventricular Outflow Tract Mean Gradient 0.91 mmHg    RV mid diameter 4.02 cm    RVOT peak VTI 9.8 cm    TAPSE 1.70 cm    LA size 5.35 cm    Left Atrium Major Axis 6.56 cm    RA Major Axis 5.76 cm    AV mean gradient 3 mmHg    AV peak gradient 4 mmHg    Ao peak ria 1.04 m/s    Ao VTI 17.90 cm    LVOT peak VTI 13.30 cm    AV valve area 2.50 cm²    AV Velocity Ratio 0.65     AV index (prosthetic) 0.74     MARILYN by Velocity Ratio 2.20 cm²    Mr max ria 4.52 m/s    MV stenosis pressure 1/2 time 48.45 ms    MV valve area p 1/2 method 4.54 cm2    Triscuspid Valve Regurgitation Peak Gradient 31 mmHg    PV mean gradient 1 mmHg    RVOT peak ria 0.56 m/s    Ao root annulus 3.22 cm    STJ 3.20 cm    Ascending aorta 3.27 cm    IVC diameter 1.93 cm    Mean e' 0.10 m/s    ZLVIDS -5.62     ZLVIDD -10.17     LA WIDTH 4.7 cm    RA Width 4.4 cm    EF 40 %    TV resting pulmonary artery pressure 39 mmHg    RV TB RVSP 11 mmHg    Est. RA pres 8 mmHg    Narrative      Left Ventricle: The left ventricle is normal in size. Normal wall   thickness. There is concentric remodeling. Mild global hyperkinesis   present. There is reduced systolic function. Ejection fraction by visual   approximation is 40%. There is normal diastolic function.    Right Ventricle: Moderate right ventricular enlargement. Wall thickness   is normal. Systolic function is moderately reduced.    Mitral Valve: There is mild regurgitation.    Tricuspid Valve: There is moderate regurgitation.    Pulmonary Artery: The estimated pulmonary artery systolic pressure is   39 mmHg.    IVC/SVC: Intermediate venous pressure at 8 mmHg.     , EKG: Reviewed, and X-Ray: CXR: X-Ray Chest PA and Lateral (CXR): No results found for this visit on 12/09/23.

## 2023-12-11 NOTE — PT/OT/SLP EVAL
"Physical Therapy Evaluation and Treatment    Patient Name: Roland Ng   MRN: [id number]  Recent Surgery: * No surgery found *      Recommendations:     Discharge Recommendations: Moderate Intensity Therapy   Discharge Equipment Recommendations: to be determined by next level of care   Barriers to discharge: Increased level of assist and Decreased caregiver support    Assessment:     Roland Ng is a 68 y.o. male admitted with a medical diagnosis of Dyspnea. He presents with the following impairments/functional limitations: weakness, impaired endurance, impaired functional mobility, gait instability, impaired balance, pain, decreased safety awareness, decreased lower extremity function, impaired cardiopulmonary response to activity, edema, decreased ROM, decreased coordination.    Rehab Prognosis: Good; patient would benefit from acute PT services to address these deficits and reach maximum level of function.    Plan:     During this hospitalization, patient to be seen 3 x/week to address the above listed problems via gait training, therapeutic activities, therapeutic exercises    Plan of Care Expires: 12/25/23    Subjective     Chief Complaint: Pt is motivated to participate  Patient Comments/Goals: to return to PLOF  Pain/Comfort:  Pain Rating 1: 0/10    Social History:  Living Environment: Patient lives with their son in a 2 story home with number of outside stair(s): 3, number of inside stair(s): 1 flight, and bed/bath on 1st floor. Son works during the day.  Prior Level of Function: Prior to admission, patient requires assistance with ADLs including bathing and dressing, driving, and ambulated household and community distances using straight cane but increased difficulty recently, reports being INDEP previously but has needed assistance for "a while"  Equipment Used at Home: cane, straight  DME owned (not currently used): none  Assistance Upon Discharge: family    Objective:     Communicated " "with nurse Mariola and epic chart review prior to session. Patient found HOB elevated with peripheral IV, telemetry upon PT entry to room.    General Precautions: Standard, fall   Orthopedic Precautions: N/A   Braces: N/A    Respiratory Status: Room air    Exams:  Cognition: Patient is oriented to Person, Place, Time, Situation  RLE ROM: WFL  RLE Strength:  Grossly 4-/5  LLE ROM: WFL  LLE Strength:  Grossly 4-/5  Sensation:    -       Intact  Skin Integrity/Edema:     -       Skin integrity: Visible skin intact  -       Edema: Moderate B LE    Functional Mobility:  Gait belt applied - Yes  Bed Mobility  Rolling Left: maximal assistance and of 2 persons  Scooting: maximal assistance and of 2 persons  Supine to Sit: maximal assistance and of 2 persons for LE management and trunk management  Transfers  Sit to Stand: moderate assistance with rolling walker  Bed to Chair: minimum assistance with rolling walker using Stand Pivot  Gait  Patient took 4 small steps to pivot from bed to chair with rolling walker and minimum assistance. Patient demonstrates occasionally unsteady. No c/o dizzines, increased SOB with exertion, educated about pursed lip breathing technique and cued for use with mobility. All lines remained intact throughout ambulation trail.  Balance  Sitting: contact guard assistance  Standing: minimum assistance  SpO2  Supine 94%  Sitting in chair 97%  MAX A to change soiled gown, total A to don socks, linens changed    Therapeutic Activities and Exercises:   Pt educated on role of PT in acute care and POC. Educated on importance of OOB activities, activity pacing, and HEP (marching/hip flex, hip abd, heel slides/LAQ, quad sets, ankle pumps) in order to maintain/regain strength. Encouraged to sit up in chair for all meals. Educated on proper use of RW for safety and to reduce risk of falling. Educated on "call don't fall" policy and increased risk of falling due to weakness, instructed to utilize call bell for " assistance with all transfers. Pt agreeable to all requests.    AM-PAC 6 CLICK MOBILITY  Total Score:11    Patient left up in chair with all lines intact and call button in reach.    GOALS:   Multidisciplinary Problems       Physical Therapy Goals          Problem: Physical Therapy    Goal Priority Disciplines Outcome Goal Variances Interventions   Physical Therapy Goal     PT, PT/OT      Description: Goals to be met by 12/25/23.  1. Pt will complete bed mobility MIN A.  2. Pt will complete sit to stand MIN A.  3. Pt will ambulate 50ft CGA using RW.  4. Pt will increase AMPAC score by 2 points to progress functional mobility.                       History:     Past Medical History:   Diagnosis Date    CAD (coronary artery disease)     had MI 2014    Chronic congestive heart failure 01/13/2021    Diabetes mellitus with no complication     Hypertension associated with diabetes     Hyperthyroidism 01/13/2021    Morbid obesity with body mass index (BMI) of 40.0 to 44.9 in adult     Paroxysmal A-fib        Past Surgical History:   Procedure Laterality Date    APPENDECTOMY      Twice       Time Tracking:     PT Received On: 12/11/23  PT Start Time: 1200  PT Stop Time: 1230  PT Total Time (min): 30 min     Billable Minutes: Evaluation 15min and Therapeutic Activity 15min    12/11/2023

## 2023-12-11 NOTE — PLAN OF CARE
OT ember completed. Recommends mod intensity therapy.  Max A x2 for sup>sit. Mod A for STS with RW. Min A for pivot t/f with RW.

## 2023-12-11 NOTE — PROGRESS NOTES
"[facility]) nurse navigator notified of HFTCC candidate in need of education and introduction to 4-6 week program.      PT aao x 3 while lying in bed. Introduced self to pt as HFTCC nurse navigator.     Patient given "Home Care Guide for Heart Failure Patients" , "Heart Failure [facility]itional [facility]" flyer and "Daily weight and symptom tracker".  Encouraged pt  to review information.      Reviewed the following key points of HFTCC program with pt and family:   1.) Take your medications as directed.    2.) Weight yourself daily   3.) Follow low salt and limited fluid diet.    4.) Stop smoking and start exercising   5.) Go to your appointments and call your team.      Pt reminded to follow Symptom tracker and to call at the onset of symptoms according to tracker.     Reviewed plan for follow up once discharged to include phone calls, in person and virtual visits to assist pt optimizing their heart failure medication regimen and encouraging healthy lifestyle modifications.  Reminded pt that program will assist them over the next 4-6 weeks and then patient will be transferred to long term care provider .  Reminded pt how to contact HFTCC navigator via phone and or via TASCET.     Pt instructed appointment will be printed on hospital discharge paperwork.     Pt also reminded HF nurse will call 48-72 hours after discharge to check on them.     PT verbalize read back of information given.  Encouraged pt and family to read over information often and contact team with any questions or concerns.      "

## 2023-12-11 NOTE — ASSESSMENT & PLAN NOTE
Multifactorial secondary to lymphedema and CHF exacerbation   Wound care consulted, recommended outpatient referral to lymphedema clinic

## 2023-12-11 NOTE — PLAN OF CARE
Bed locked, in lowest position. Call bell in reach. Purposeful rounding done. Cardiac monitoring in use. Chart check complete. Will continue with plan of care.

## 2023-12-11 NOTE — CONSULTS
Consulted on Mr. Ng due to chronic lymphedema of BLE. He is awake and alert, admitted due to dyspnea. He reports he has been followed in OP lymphedema clinic at [facility] in past, but not currently. He has had BLE compression stockings, but they no longer fit.  BLE assessed. Brauny edema and skin changes consistent with chronic lymphedema, no open ulcerations. Moisturizer cream applied. Discussed recommendations with patient - he should f/u with OP lymphedema clinic for ongoing management and get fitted for Farrow style compression wraps. Will consult case management to assist with setting this up - can be at [facility] or [facility] where he has been seen before.

## 2023-12-11 NOTE — PROGRESS NOTES
[facility]  Cardiology  Progress Note    Patient Name: Roland Ng  MRN: [id number]  Admission Date: 12/9/2023  Hospital Length of Stay: 1 days  Code Status: Full Code   Attending Physician: Solange Blanchard DO   Primary Care Physician: Janis Green NP  Expected Discharge Date:   Principal Problem:Dyspnea    Subjective:   HPI:  68 year old  male with PMHX of CHF and atrial fibrillation presented  to ED with worsening constant SOB x 1 month. Pt  reports sleeping with four pillows and waking up with SOB in the night.  Associated sxs include  intermittent sharp chest pain,  dizziness,  abdominal swelling and BLE  swelling. Pt states he is on a fluid  pill he takes BID since BLE  swelling and  venous stasis onset in 2016. Pt states abdominal swelling is new. Pt was seen in [facility]  ED on 12/523 for SOB and was instructed  to double Lasix dose. Noted HR  was in 130s in aflutter on 12/5, was seen at [facility].       Troponin was 0.031 yesterday. . Potassium  3.4. LFTs are elevated. LST 72/ WBC is 13.   EKG shows atrial flutter with variable AV block, Right bundle branch block, Septal infarct.   EF down to 40%, prior was 60% in March 2022             Hospital Course:   12/11/23-Patient seen and examined today, sitting up in bed. Feels ok. Remains SOB, needs continued IV diuresis. HR controlled. Labs reviewed.       Review of Systems   Constitutional: Positive for malaise/fatigue.   HENT: Negative.     Eyes: Negative.    Cardiovascular:  Positive for dyspnea on exertion and leg swelling.   Respiratory:  Positive for shortness of breath.    Endocrine: Negative.    Hematologic/Lymphatic: Negative.    Skin: Negative.    Musculoskeletal: Negative.    Gastrointestinal: Negative.    Genitourinary: Negative.    Neurological: Negative.    Psychiatric/Behavioral: Negative.     Allergic/Immunologic: Negative.      Objective:     Vital Signs (Most Recent):  Temp: 98 °F (36.7 °C) (12/11/23 1212)  Pulse: 74 (12/11/23  1212)  Resp: 13 (12/11/23 1212)  BP: 98/62 (12/11/23 1212)  SpO2: 95 % (12/11/23 1212) Vital Signs (24h Range):  Temp:  [97.7 °F (36.5 °C)-98.3 °F (36.8 °C)] 98 °F (36.7 °C)  Pulse:  [63-90] 74  Resp:  [13-20] 13  SpO2:  [94 %-98 %] 95 %  BP: ()/(62-77) 98/62     Weight: (!) 137.4 kg (303 lb)  Body mass index is 42.26 kg/m².     SpO2: 95 %         Intake/Output Summary (Last 24 hours) at 12/11/2023 1418  Last data filed at 12/11/2023 1212  Gross per 24 hour   Intake 0.58 ml   Output 2575 ml   Net -2574.42 ml       Lines/Drains/Airways       Peripheral Intravenous Line  Duration                  Peripheral IV - Single Lumen 12/11/23 0120 20 G Anterior;Left Forearm <1 day                       Physical Exam  Vitals and nursing note reviewed.   Constitutional:       General: He is not in acute distress.     Appearance: He is well-developed. He is obese. He is not diaphoretic.   HENT:      Head: Normocephalic and atraumatic.   Eyes:      General:         Right eye: No discharge.         Left eye: No discharge.      Pupils: Pupils are equal, round, and reactive to light.   Neck:      Vascular: JVD present.   Cardiovascular:      Rate and Rhythm: Normal rate. Rhythm irregularly irregular.      Heart sounds: Normal heart sounds, S1 normal and S2 normal. No murmur heard.  Pulmonary:      Effort: Pulmonary effort is normal. No respiratory distress.      Breath sounds: Rales present. No wheezing.   Abdominal:      General: There is no distension.   Musculoskeletal:      Right lower leg: Edema present.      Left lower leg: Edema present.   Skin:     General: Skin is warm and dry.      Findings: No erythema.   Neurological:      Mental Status: He is alert and oriented to person, place, and time.   Psychiatric:         Mood and Affect: Mood normal.         Behavior: Behavior normal.         Thought Content: Thought content normal.            Significant Labs: CMP   Recent Labs   Lab 12/09/23  2302 12/11/23  0310     142   K 3.4* 3.4*    104   CO2 24 25   * 124*   BUN 23 22   CREATININE 1.3 1.1   CALCIUM 9.3 8.3*   PROT 7.9  --    ALBUMIN 3.6  --    BILITOT 1.6*  --    ALKPHOS 79  --    AST 72*  --    *  --    ANIONGAP 16 13   , CBC   Recent Labs   Lab 12/09/23  2302 12/11/23  0310   WBC 13.19* 11.15   HGB 15.2 13.8*   HCT 44.8 41.7    177   , Troponin   Recent Labs   Lab 12/09/23  2302   TROPONINI 0.031*   , and All pertinent lab results from the last 24 hours have been reviewed.    Significant Imaging: Echocardiogram: Transthoracic echo (TTE) complete (Cupid Only):   Results for orders placed or performed during the hospital encounter of 12/09/23   Echo   Result Value Ref Range    BSA 2.62 m2    LVOT stroke volume 44.74 cm3    LVIDd 5.31 3.5 - 6.0 cm    LV Systolic Volume 81.84 mL    LV Systolic Volume Index 32.5 mL/m2    LVIDs 4.27 (A) 2.1 - 4.0 cm    LV Diastolic Volume 135.86 mL    LV Diastolic Volume Index 53.91 mL/m2    IVS 1.28 (A) 0.6 - 1.1 cm    LVOT diameter 2.07 cm    LVOT area 3.4 cm2    FS 20 (A) 28 - 44 %    Left Ventricle Relative Wall Thickness 0.49 cm    Posterior Wall 1.29 (A) 0.6 - 1.1 cm    LV mass 283.13 g    LV Mass Index 112 g/m2    MV Peak E Ishmael 1.08 m/s    TDI LATERAL 0.11 m/s    TDI SEPTAL 0.08 m/s    E/E' ratio 11.37 m/s    MV Peak A Ishmael 0.23 m/s    TR Max Ishmael 2.78 m/s    E/A ratio 4.70     IVRT 31.40 msec    E wave deceleration time 167.06 msec    LV SEPTAL E/E' RATIO 13.50 m/s    LV LATERAL E/E' RATIO 9.82 m/s    LVOT peak ishmael 0.68 m/s    Left Ventricular Outflow Tract Mean Velocity 0.44 cm/s    Left Ventricular Outflow Tract Mean Gradient 0.91 mmHg    RV mid diameter 4.02 cm    RVOT peak VTI 9.8 cm    TAPSE 1.70 cm    LA size 5.35 cm    Left Atrium Major Axis 6.56 cm    RA Major Axis 5.76 cm    AV mean gradient 3 mmHg    AV peak gradient 4 mmHg    Ao peak ishmael 1.04 m/s    Ao VTI 17.90 cm    LVOT peak VTI 13.30 cm    AV valve area 2.50 cm²    AV Velocity Ratio 0.65     AV  index (prosthetic) 0.74     MARILYN by Velocity Ratio 2.20 cm²    Mr max ria 4.52 m/s    MV stenosis pressure 1/2 time 48.45 ms    MV valve area p 1/2 method 4.54 cm2    Triscuspid Valve Regurgitation Peak Gradient 31 mmHg    PV mean gradient 1 mmHg    RVOT peak ria 0.56 m/s    Ao root annulus 3.22 cm    STJ 3.20 cm    Ascending aorta 3.27 cm    IVC diameter 1.93 cm    Mean e' 0.10 m/s    ZLVIDS -5.62     ZLVIDD -10.17     LA WIDTH 4.7 cm    RA Width 4.4 cm    EF 40 %    TV resting pulmonary artery pressure 39 mmHg    RV TB RVSP 11 mmHg    Est. RA pres 8 mmHg    Narrative      Left Ventricle: The left ventricle is normal in size. Normal wall   thickness. There is concentric remodeling. Mild global hyperkinesis   present. There is reduced systolic function. Ejection fraction by visual   approximation is 40%. There is normal diastolic function.    Right Ventricle: Moderate right ventricular enlargement. Wall thickness   is normal. Systolic function is moderately reduced.    Mitral Valve: There is mild regurgitation.    Tricuspid Valve: There is moderate regurgitation.    Pulmonary Artery: The estimated pulmonary artery systolic pressure is   39 mmHg.    IVC/SVC: Intermediate venous pressure at 8 mmHg.     , EKG: Reviewed, and X-Ray: CXR: X-Ray Chest PA and Lateral (CXR): No results found for this visit on 12/09/23.  Assessment and Plan:   Patient who presents with atrial flutter and decompensated CHF. HR controlled. Needs additional diuresis. Keep NPO after MN for possible ZOE/DCCV tmw. Ischemic workup once compensated/improved.     Atrial flutter  Rate controlled with toprol xl 50 bid  Once euvolemic, if still symptomatic with SOB, will consider ZOE/Cardioversion vs EP evaluation for flutter/fib ablation    12/11/23  -Continue Toprol XL  -Continue heparin gtt  -Keep NPO after MN for possible ZOE/DCCV tmw    Bilateral lower extremity edema  -Assess response to IV diuresis  -CVI also contributing    Paroxysmal  A-fib  -Continue anticoagulation and toprol xl    Morbid obesity with body mass index (BMI) of 40.0 to 44.9 in adult  -weight loss    Hypertension  Titrate medications     Acute exacerbation of CHF (congestive heart failure)  New onset CHF with EF 40%  Needs ischemia evaluation once euvolemic  Monitor I/Os  Continue IV diuresis     12/11/23  -Still overloaded  -Continue IV diuresis  -Continue ACEi, BB  -Strict I's/O's  -Ischemic workup once volume status improved        VTE Risk Mitigation (From admission, onward)           Ordered     heparin 25,000 units in dextrose 5% 250 mL (100 units/mL) infusion LOW INTENSITY nomogram - [facility]  Continuous        Question:  Begin at (units/kg/hr)  Answer:  12    12/10/23 1752     heparin 25,000 units in dextrose 5% (100 units/ml) IV bolus from bag - ADDITIONAL PRN BOLUS - 60 units/kg  As needed (PRN)        Question:  Heparin Infusion Adjustment (DO NOT MODIFY ANSWER)  Answer:  [email or web address]\heparin LOW INTENSITY nomogram for [facility] [id number]    12/10/23 1752     heparin 25,000 units in dextrose 5% (100 units/ml) IV bolus from bag - ADDITIONAL PRN BOLUS - 30 units/kg  As needed (PRN)        Question:  Heparin Infusion Adjustment (DO NOT MODIFY ANSWER)  Answer:  \[email or web address]\[email or web address]\heparin LOW INTENSITY nomogram for [facility] [id number]    12/10/23 1752     Reason for No Pharmacological VTE Prophylaxis  Once        Question:  Reasons:  Answer:  Already adequately anticoagulated on oral Anticoagulants    12/10/23 0102     IP VTE HIGH RISK PATIENT  Once         12/10/23 0102     Place sequential compression device  Until discontinued         12/10/23 0102                    Marisa Pizano PA-C  Cardiology  [facility]

## 2023-12-11 NOTE — PLAN OF CARE
[facility]urg  Initial Discharge Assessment       Primary Care Provider: Janis Green NP    Admission Diagnosis: Tachypnea [R06.82]  Lower extremity edema [R60.0]  SOB (shortness of breath) [R06.02]  Scrotal edema [N50.89]  CHF exacerbation [I50.9]  Atrial flutter, unspecified type [I48.92]  Systolic congestive heart failure, unspecified HF chronicity [I50.20]    Admission Date: 12/9/2023  Expected Discharge Date: per atending         Payor: [email or web address] BLUE MEDICARE ADVANTAGE / Plan: [employer] DUAL ADVANTAGE / Product Type: Medicare Advantage /     Extended Emergency Contact Information  Primary Emergency Contact: Melania Rosa  Mobile Phone: [phone]  Relation: Sister  Preferred language: English   needed? No    Discharge Plan A:  (tbd)         [facility]  [address] [place]  [facility] [address]  Phone: [phone] Fax: [phone]    [facility] [address] [place] [id number]  Phone: [phone] Fax: [phone]      Initial Assessment (most recent)       Adult Discharge Assessment - 12/11/23 1131          Discharge Assessment    Assessment Type Discharge Planning Assessment     Confirmed/corrected address, phone number and insurance Yes     Confirmed Demographics Correct on Facesheet     Source of Information patient     When was your last doctors appointment? --   last month    Communicated RABIA with patient/caregiver Date not available/Unable to determine     Reason For Admission tachypnea     People in Home child(rai), adult     Do you expect to return to your current living situation? Yes     Do you have help at home or someone to help you manage your care at home? No     Prior to hospitilization cognitive status: Alert/Oriented     Current cognitive status: Alert/Oriented     Equipment Currently Used at Home cane, straight     Readmission within 30 days? No     Patient currently being followed by outpatient  case management? No     Do you currently have service(s) that help you manage your care at home? No     Do you take prescription medications? Yes     Do you have prescription coverage? Yes     Coverage healthy blue     Do you have any problems affording any of your prescribed medications? No     Is the patient taking medications as prescribed? yes     Who is going to help you get home at discharge? son     How do you get to doctors appointments? car, drives self     Are you on dialysis? No     Do you take coumadin? No     Discharge Plan A --   tbd                  Pt said son can be added to emergency contact. Kvng [phone].

## 2023-12-11 NOTE — PLAN OF CARE
PT EVAL complete. Required MAX A of 2 for bed mobility, MOD A for STS, MIN A for transfer. Recommending moderate intensity therapy upon d/c.

## 2023-12-11 NOTE — ASSESSMENT & PLAN NOTE
Patient has chronic hyperthyroidism. TFTs reviewed-   Lab Results   Component Value Date    TSH 0.083 (L) 12/10/2023     Although TSH is slightly low, free T4 is within normal limits  Will continue home methimazole  Outpatient follow-up for further monitoring and medication titration as needed

## 2023-12-11 NOTE — RESPIRATORY THERAPY
Home Oxygen Evaluation - [facility] - Cardiopulmonary Department      Date Performed: 12/11/2023      1) Patient's Home O2 Sat on room air, while at rest: Room Air SpO2 At Rest: 91 %        If O2 sats on room air at rest are 88% or below, patient qualifies.  Document O2 liter flow needed in Step 2.  If O2 sats are 89% or above, complete Step 3.        2)  If patient is not ambulated and O2 sats are <88%, what is the O2 liter flow required to meet ordered saturation?      If O2 sats on room air while exercising remain 89% or above patient does not qualify, no further testing needed Document N/A in step 3. If O2 sats on room air while exercising are 88% or below, continue to Step 4.    3) Patient's O2 Sat on room air while exercising: Room Air SpO2 During Ambulation: 98 %        4) Patient's O2 Sat while exercising on O2:   at Ambulation O2 LPM: 0 LPM         (Must show improvement from #4 for patients to qualify)

## 2023-12-11 NOTE — ASSESSMENT & PLAN NOTE
Ultrasound showed large bilateral hydrocele with extensive scrotal wall thickening or edema, no abscess   Likely secondary to excessive volume in setting of CHF exacerbation

## 2023-12-11 NOTE — ASSESSMENT & PLAN NOTE
Patient is identified as having Diastolic (HFpEF) heart failure that is Acute on chronic. CHF is currently uncontrolled due to Continued edema of extremities and Dyspnea not returned to baseline after single doses of IV diuretic. Latest ECHO performed and demonstrates- Results for orders placed during the hospital encounter of 03/08/22    Echo    Interpretation Summary  · The left ventricle is normal in size with concentric hypertrophy and normal systolic function.  · Mild left atrial enlargement.  · Indeterminate left ventricular diastolic function.  · The estimated PA systolic pressure is 18 mmHg.  · Normal right ventricular size with normal right ventricular systolic function.  · Intermediate central venous pressure (8 mmHg).  · The estimated ejection fraction is 65%.  · Mild pulmonic regurgitation.  · Mild mitral regurgitation.  Continue Beta Blocker, ACE/ARB, and Furosemide and monitor clinical status closely. Monitor on telemetry. Patient is on CHF pathway.  Monitor strict Is&Os and daily weights.  Place on fluid restriction of 1.5 L. Cardiology has been consulted. Continue to stress to patient importance of self efficacy and  on diet for CHF. Last BNP reviewed- and noted below   Recent Labs   Lab 12/09/23  2302   *       -telemetry  -continue IV diuretics  -bipap qhs prn  -titrate oxygen therapy as needed

## 2023-12-11 NOTE — ASSESSMENT & PLAN NOTE
Rate controlled with toprol xl 50 bid  Once euvolemic, if still symptomatic with SOB, will consider ZOE/Cardioversion vs EP evaluation for flutter/fib ablation    12/11/23  -Continue Toprol XL  -Continue heparin gtt  -Keep NPO after MN for possible ZOE/DCCV tmw

## 2023-12-11 NOTE — PROGRESS NOTES
[facility] Medicine  Progress Note    Patient Name: Roland Ng  MRN: [id number]  Patient Class: IP- Inpatient   Admission Date: 12/9/2023  Length of Stay: 1 days  Attending Physician: Solange Blanchard DO  Primary Care Provider: Janis Green NP        Subjective:     Principal Problem:SOB (shortness of breath)        HPI:  Roland Ng is a 68 y.o. male with a PMH  has a past medical history of CAD (coronary artery disease), Chronic congestive heart failure (01/13/2021), Diabetes mellitus with no complication, Hypertension associated with diabetes, Hyperthyroidism (01/13/2021), Morbid obesity with body mass index (BMI) of 40.0 to 44.9 in adult, and Paroxysmal A-fib. who presented to the ED for further evaluation of progressively worsening dyspnea/shortness a breath was well as bilateral lower extremity, scrotal, and penile swelling/edema.  Patient reports significant history of heart failure and was previously followed by Dr. Decker but stated he is not seen him in quite some time.  Patient also reported being off his home medications for approximately 3-4 months prior to getting back on them due to insurance related issues and has been compliant over the past 3 months.  Patient also reports non adherence to fluid/dietary restrictions and states he drinks approximately 20 bottles of water daily.  He reported no known alleviating or aggravating factors noted and reported associated symptoms included dyspnea both at rest and on exertion, orthopnea, PND, and wheezing in addition to reported swelling as noted above.  Patient reports negative history of asthma/COPD, does not use home oxygen, and does not use home CPAP.  All other review of systems negative except as noted above.  Initial workup in the ED concerning for signs/symptoms of acute CHF exacerbation with patient being admitted to Hospital Medicine inpatient for continued medical management.      PCP: Janis Green  N.      Overview/Hospital Course:  Cardiology consulted.  Patient currently being treated for CHF exacerbation with IV diuretics.  Given persistent atrial flutter, planned for ZOE/cardioversion once euvolemic.    PT/OT recommends moderate intensity therapy on discharge. SW consulted for SNF placement    Interval History: No acute events overnight.  Seen and examined without any family present.  Shortness of breath and swelling of lower extremities improving.  No new complaints.      Review of Systems  Objective:     Vital Signs (Most Recent):  Temp: 98.1 °F (36.7 °C) (12/11/23 1614)  Pulse: (!) 50 (12/11/23 1614)  Resp: 15 (12/11/23 1614)  BP: (!) 146/65 (12/11/23 1614)  SpO2: 100 % (12/11/23 1614) Vital Signs (24h Range):  Temp:  [97.7 °F (36.5 °C)-98.3 °F (36.8 °C)] 98.1 °F (36.7 °C)  Pulse:  [50-90] 50  Resp:  [13-20] 15  SpO2:  [94 %-100 %] 100 %  BP: ()/(62-77) 146/65     Weight: (!) 137.4 kg (303 lb)  Body mass index is 42.26 kg/m².    Intake/Output Summary (Last 24 hours) at 12/11/2023 1631  Last data filed at 12/11/2023 1212  Gross per 24 hour   Intake 0.58 ml   Output 2575 ml   Net -2574.42 ml         Physical Exam  Vitals and nursing note reviewed.   Constitutional:       General: He is not in acute distress.     Appearance: He is obese. He is not ill-appearing or diaphoretic.   HENT:      Head: Normocephalic and atraumatic.      Right Ear: External ear normal.      Left Ear: External ear normal.      Nose: Nose normal.      Mouth/Throat:      Mouth: Mucous membranes are moist.   Eyes:      Extraocular Movements: Extraocular movements intact.      Pupils: Pupils are equal, round, and reactive to light.   Cardiovascular:      Rate and Rhythm: Normal rate. Rhythm irregular.   Pulmonary:      Effort: Pulmonary effort is normal. No respiratory distress.      Breath sounds: No wheezing.      Comments: On room air  Abdominal:      General: Bowel sounds are normal.      Palpations: Abdomen is soft.       Tenderness: There is no abdominal tenderness. There is no guarding or rebound.   Musculoskeletal:      Cervical back: Neck supple.      Right lower leg: Edema present.      Left lower leg: Edema present.   Skin:     General: Skin is warm and dry.   Neurological:      General: No focal deficit present.      Mental Status: He is alert and oriented to person, place, and time.   Psychiatric:         Mood and Affect: Mood normal.             Significant Labs: All pertinent labs within the past 24 hours have been reviewed.  CBC:   Recent Labs   Lab 12/09/23  2302 12/11/23  0310   WBC 13.19* 11.15   HGB 15.2 13.8*   HCT 44.8 41.7    177     CMP:   Recent Labs   Lab 12/09/23  2302 12/11/23  0310    142   K 3.4* 3.4*    104   CO2 24 25   * 124*   BUN 23 22   CREATININE 1.3 1.1   CALCIUM 9.3 8.3*   PROT 7.9  --    ALBUMIN 3.6  --    BILITOT 1.6*  --    ALKPHOS 79  --    AST 72*  --    *  --    ANIONGAP 16 13     Magnesium:   Recent Labs   Lab 12/10/23  0240 12/11/23  0310   MG 1.9 2.0       Significant Imaging: I have reviewed all pertinent imaging results/findings within the past 24 hours.    Assessment/Plan:      * SOB (shortness of breath)  In setting of CHF exacerbation  Improving      Atrial flutter  Remains on heparin infusion  Possible ZOE/cardioversion in the morning as per Cardiology      Scrotal swelling  Ultrasound showed large bilateral hydrocele with extensive scrotal wall thickening or edema, no abscess   Likely secondary to excessive volume in setting of CHF exacerbation      Lower extremity edema  Multifactorial secondary to lymphedema and CHF exacerbation   Wound care consulted, recommended outpatient referral to lymphedema clinic      Paroxysmal A-fib  Patient with Paroxysmal (<7 days) atrial fibrillation which is uncontrolled currently with Beta Blocker and Calcium Channel Blocker. Patient is currently in atrial fibrillation.[id number] Score: 3. Anticoagulation indicated.  Anticoagulation done with Eliquis .      Atherosclerosis of native coronary artery of native heart without angina pectoris  Patient with known CAD s/p  LHC without stent placement , which is controlled Will continue  Eliquis, ASA, and Statin and monitor for S/Sx of angina/ACS. Continue to monitor on telemetry.       Morbid obesity with body mass index (BMI) of 40.0 to 44.9 in adult  Body mass index is 42.26 kg/m². Morbid obesity complicates all aspects of disease management from diagnostic modalities to treatment. Weight loss encouraged and health benefits explained to patient.       Hypertension  Chronic, controlled. Latest blood pressure and vitals reviewed-     Temp:  [97.7 °F (36.5 °C)-98.3 °F (36.8 °C)]   Pulse:  [50-90]   Resp:  [13-20]   BP: ()/(62-77)   SpO2:  [94 %-100 %] .   Home meds for hypertension were reviewed and noted below.   Hypertension Medications               amLODIPine (NORVASC) 10 MG tablet Take 1 tablet (10 mg total) by mouth once daily.    furosemide (LASIX) 40 MG tablet Take 1 tablet (40 mg total) by mouth once daily.    lisinopriL (PRINIVIL,ZESTRIL) 20 MG tablet Take 1 tablet (20 mg total) by mouth once daily.    metoprolol succinate (TOPROL-XL) 50 MG 24 hr tablet Take 1 tablet (50 mg total) by mouth once daily.          While in the hospital, will manage blood pressure as follows; Continue home antihypertensive regimen except for p.o. Lasix given he is currently on IV Lasix    Will utilize p.r.n. blood pressure medication only if patient's blood pressure greater than 160/100 and he develops symptoms such as worsening chest pain or shortness of breath.      Acute exacerbation of CHF (congestive heart failure)  Patient is identified as having Diastolic (HFpEF) heart failure that is Acute on chronic. CHF is currently uncontrolled due to Continued edema of extremities and Dyspnea not returned to baseline after single doses of IV diuretic. Latest ECHO performed and demonstrates-  Results for orders placed during the hospital encounter of 03/08/22    Echo    Interpretation Summary  · The left ventricle is normal in size with concentric hypertrophy and normal systolic function.  · Mild left atrial enlargement.  · Indeterminate left ventricular diastolic function.  · The estimated PA systolic pressure is 18 mmHg.  · Normal right ventricular size with normal right ventricular systolic function.  · Intermediate central venous pressure (8 mmHg).  · The estimated ejection fraction is 65%.  · Mild pulmonic regurgitation.  · Mild mitral regurgitation.  Continue Beta Blocker, ACE/ARB, and Furosemide and monitor clinical status closely. Monitor on telemetry. Patient is on CHF pathway.  Monitor strict Is&Os and daily weights.  Place on fluid restriction of 1.5 L. Cardiology has been consulted. Continue to stress to patient importance of self efficacy and  on diet for CHF. Last BNP reviewed- and noted below   Recent Labs   Lab 12/09/23  2302   *       -telemetry  -continue IV diuretics  -bipap qhs prn  -titrate oxygen therapy as needed         Hyperthyroidism  Patient has chronic hyperthyroidism. TFTs reviewed-   Lab Results   Component Value Date    TSH 0.083 (L) 12/10/2023     Although TSH is slightly low, free T4 is within normal limits  Will continue home methimazole  Outpatient follow-up for further monitoring and medication titration as needed      VTE Risk Mitigation (From admission, onward)           Ordered     heparin 25,000 units in dextrose 5% 250 mL (100 units/mL) infusion LOW INTENSITY nomogram - [facility]  Continuous        Question:  Begin at (units/kg/hr)  Answer:  12    12/10/23 7632     heparin 25,000 units in dextrose 5% (100 units/ml) IV bolus from bag - ADDITIONAL PRN BOLUS - 60 units/kg  As needed (PRN)        Question:  Heparin Infusion Adjustment (DO NOT MODIFY ANSWER)  Answer:  [email or web address]\epic\Images\Pharmacy\HeparinInfusions\heparin LOW INTENSITY nomogram for [facility]  [id number]    12/10/23 1752     heparin 25,000 units in dextrose 5% (100 units/ml) IV bolus from bag - ADDITIONAL PRN BOLUS - 30 units/kg  As needed (PRN)        Question:  Heparin Infusion Adjustment (DO NOT MODIFY ANSWER)  Answer:  [email or web address]\epic\Images\Pharmacy\HeparinInfusions\heparin LOW INTENSITY nomogram for [facility] [id number]    12/10/23 1752     Reason for No Pharmacological VTE Prophylaxis  Once        Question:  Reasons:  Answer:  Already adequately anticoagulated on oral Anticoagulants    12/10/23 0102     IP VTE HIGH RISK PATIENT  Once         12/10/23 0102     Place sequential compression device  Until discontinued         12/10/23 0102                    Discharge Planning   RABIA:      Code Status: Full Code   Is the patient medically ready for discharge?:     Reason for patient still in hospital (select all that apply): Patient trending condition, Treatment, and Pending disposition  Discharge Plan A:  (tbd)                  Solange Blanchard DO  Department of Hospital Medicine   [facility] - Med Surg

## 2023-12-11 NOTE — PLAN OF CARE
Patient remains free of falls and injuries during shift. No signs of pain or discomfort noted.Telemonitoring in place. IV heparin infusing as ordered. Call light within reach. Chart check complete. Plan of care ongoing.     Problem: Adult Inpatient Plan of Care  Goal: Plan of Care Review  Outcome: Ongoing, Progressing  Goal: Patient-Specific Goal (Individualized)  Outcome: Ongoing, Progressing  Goal: Absence of Hospital-Acquired Illness or Injury  Outcome: Ongoing, Progressing  Goal: Optimal Comfort and Wellbeing  Outcome: Ongoing, Progressing  Goal: Readiness for Transition of Care  Outcome: Ongoing, Progressing

## 2023-12-11 NOTE — ASSESSMENT & PLAN NOTE
Patient with Paroxysmal (<7 days) atrial fibrillation which is uncontrolled currently with Beta Blocker and Calcium Channel Blocker. Patient is currently in atrial fibrillation.[id number] Score: 3. Anticoagulation indicated. Anticoagulation done with Eliquis .

## 2023-12-11 NOTE — HOSPITAL COURSE
Cardiology consulted.  Patient currently being treated for CHF exacerbation with IV diuretics.  Given persistent atrial flutter, planned for DANIELLE/cardioversion once euvolemic.    PT/OT recommends moderate intensity therapy on discharge. SW consulted for SNF placement.  Has been accepted at [facility], insurance authorization obtained.    Tentatively planned for danielle/cardioversion on Monday 12/18/2023.    Of note, patient recorded to have multiple instances of heart rate in the 40s on EMR.  However telemetry review did not show any bradycardia, patient's heart rate has been staying mostly in the 80s and occasionally goes up over 100s for short period of time.  S/p cardioversion. And will dc on eliquis to follow up with cards.    Hospital course complicated by GRAHAM likely iatrogenic from diuresis.  Diuretics held.    Pt seen and examined on day of dc and stable for dc.

## 2023-12-11 NOTE — SUBJECTIVE & OBJECTIVE
Interval History: No acute events overnight.  Seen and examined without any family present.  Shortness of breath and swelling of lower extremities improving.  No new complaints.      Review of Systems  Objective:     Vital Signs (Most Recent):  Temp: 98.1 °F (36.7 °C) (12/11/23 1614)  Pulse: (!) 50 (12/11/23 1614)  Resp: 15 (12/11/23 1614)  BP: (!) 146/65 (12/11/23 1614)  SpO2: 100 % (12/11/23 1614) Vital Signs (24h Range):  Temp:  [97.7 °F (36.5 °C)-98.3 °F (36.8 °C)] 98.1 °F (36.7 °C)  Pulse:  [50-90] 50  Resp:  [13-20] 15  SpO2:  [94 %-100 %] 100 %  BP: ()/(62-77) 146/65     Weight: (!) 137.4 kg (303 lb)  Body mass index is 42.26 kg/m².    Intake/Output Summary (Last 24 hours) at 12/11/2023 1631  Last data filed at 12/11/2023 1212  Gross per 24 hour   Intake 0.58 ml   Output 2575 ml   Net -2574.42 ml         Physical Exam  Vitals and nursing note reviewed.   Constitutional:       General: He is not in acute distress.     Appearance: He is obese. He is not ill-appearing or diaphoretic.   HENT:      Head: Normocephalic and atraumatic.      Right Ear: External ear normal.      Left Ear: External ear normal.      Nose: Nose normal.      Mouth/Throat:      Mouth: Mucous membranes are moist.   Eyes:      Extraocular Movements: Extraocular movements intact.      Pupils: Pupils are equal, round, and reactive to light.   Cardiovascular:      Rate and Rhythm: Normal rate. Rhythm irregular.   Pulmonary:      Effort: Pulmonary effort is normal. No respiratory distress.      Breath sounds: No wheezing.      Comments: On room air  Abdominal:      General: Bowel sounds are normal.      Palpations: Abdomen is soft.      Tenderness: There is no abdominal tenderness. There is no guarding or rebound.   Musculoskeletal:      Cervical back: Neck supple.      Right lower leg: Edema present.      Left lower leg: Edema present.   Skin:     General: Skin is warm and dry.   Neurological:      General: No focal deficit present.       Mental Status: He is alert and oriented to person, place, and time.   Psychiatric:         Mood and Affect: Mood normal.             Significant Labs: All pertinent labs within the past 24 hours have been reviewed.  CBC:   Recent Labs   Lab 12/09/23 2302 12/11/23 0310   WBC 13.19* 11.15   HGB 15.2 13.8*   HCT 44.8 41.7    177     CMP:   Recent Labs   Lab 12/09/23 2302 12/11/23 0310    142   K 3.4* 3.4*    104   CO2 24 25   * 124*   BUN 23 22   CREATININE 1.3 1.1   CALCIUM 9.3 8.3*   PROT 7.9  --    ALBUMIN 3.6  --    BILITOT 1.6*  --    ALKPHOS 79  --    AST 72*  --    *  --    ANIONGAP 16 13     Magnesium:   Recent Labs   Lab 12/10/23  0240 12/11/23 0310   MG 1.9 2.0       Significant Imaging: I have reviewed all pertinent imaging results/findings within the past 24 hours.

## 2023-12-11 NOTE — ASSESSMENT & PLAN NOTE
Chronic, controlled. Latest blood pressure and vitals reviewed-     Temp:  [97.7 °F (36.5 °C)-98.3 °F (36.8 °C)]   Pulse:  [50-90]   Resp:  [13-20]   BP: ()/(62-77)   SpO2:  [94 %-100 %] .   Home meds for hypertension were reviewed and noted below.   Hypertension Medications               amLODIPine (NORVASC) 10 MG tablet Take 1 tablet (10 mg total) by mouth once daily.    furosemide (LASIX) 40 MG tablet Take 1 tablet (40 mg total) by mouth once daily.    lisinopriL (PRINIVIL,ZESTRIL) 20 MG tablet Take 1 tablet (20 mg total) by mouth once daily.    metoprolol succinate (TOPROL-XL) 50 MG 24 hr tablet Take 1 tablet (50 mg total) by mouth once daily.          While in the hospital, will manage blood pressure as follows; Continue home antihypertensive regimen except for p.o. Lasix given he is currently on IV Lasix    Will utilize p.r.n. blood pressure medication only if patient's blood pressure greater than 160/100 and he develops symptoms such as worsening chest pain or shortness of breath.

## 2023-12-12 LAB
ANION GAP SERPL CALC-SCNC: 10 MMOL/L (ref 8–16)
ANION GAP SERPL CALC-SCNC: 12 MMOL/L (ref 8–16)
APTT PPP: 49.2 SEC (ref 21–32)
BASOPHILS # BLD AUTO: 0.02 K/UL (ref 0–0.2)
BASOPHILS NFR BLD: 0.2 % (ref 0–1.9)
BUN SERPL-MCNC: 17 MG/DL (ref 8–23)
BUN SERPL-MCNC: 19 MG/DL (ref 8–23)
CALCIUM SERPL-MCNC: 8.7 MG/DL (ref 8.7–10.5)
CALCIUM SERPL-MCNC: 9.4 MG/DL (ref 8.7–10.5)
CHLORIDE SERPL-SCNC: 102 MMOL/L (ref 95–110)
CHLORIDE SERPL-SCNC: 104 MMOL/L (ref 95–110)
CO2 SERPL-SCNC: 28 MMOL/L (ref 23–29)
CO2 SERPL-SCNC: 29 MMOL/L (ref 23–29)
CREAT SERPL-MCNC: 1 MG/DL (ref 0.5–1.4)
CREAT SERPL-MCNC: 1.2 MG/DL (ref 0.5–1.4)
DIFFERENTIAL METHOD BLD: ABNORMAL
EOSINOPHIL # BLD AUTO: 0.1 K/UL (ref 0–0.5)
EOSINOPHIL NFR BLD: 0.7 % (ref 0–8)
ERYTHROCYTE [DISTWIDTH] IN BLOOD BY AUTOMATED COUNT: 16.2 % (ref 11.5–14.5)
EST. GFR  (NO RACE VARIABLE): >60 ML/MIN/1.73 M^2
EST. GFR  (NO RACE VARIABLE): >60 ML/MIN/1.73 M^2
GLUCOSE SERPL-MCNC: 124 MG/DL (ref 70–110)
GLUCOSE SERPL-MCNC: 136 MG/DL (ref 70–110)
HCT VFR BLD AUTO: 45.6 % (ref 40–54)
HGB BLD-MCNC: 15 G/DL (ref 14–18)
IMM GRANULOCYTES # BLD AUTO: 0.05 K/UL (ref 0–0.04)
IMM GRANULOCYTES NFR BLD AUTO: 0.4 % (ref 0–0.5)
LYMPHOCYTES # BLD AUTO: 1.9 K/UL (ref 1–4.8)
LYMPHOCYTES NFR BLD: 15.4 % (ref 18–48)
MAGNESIUM SERPL-MCNC: 2.3 MG/DL (ref 1.6–2.6)
MCH RBC QN AUTO: 26 PG (ref 27–31)
MCHC RBC AUTO-ENTMCNC: 32.9 G/DL (ref 32–36)
MCV RBC AUTO: 79 FL (ref 82–98)
MONOCYTES # BLD AUTO: 1.2 K/UL (ref 0.3–1)
MONOCYTES NFR BLD: 9.7 % (ref 4–15)
NEUTROPHILS # BLD AUTO: 8.8 K/UL (ref 1.8–7.7)
NEUTROPHILS NFR BLD: 73.6 % (ref 38–73)
NRBC BLD-RTO: 0 /100 WBC
PLATELET # BLD AUTO: 194 K/UL (ref 150–450)
PMV BLD AUTO: 11.6 FL (ref 9.2–12.9)
POTASSIUM SERPL-SCNC: 3.4 MMOL/L (ref 3.5–5.1)
POTASSIUM SERPL-SCNC: 3.6 MMOL/L (ref 3.5–5.1)
RBC # BLD AUTO: 5.77 M/UL (ref 4.6–6.2)
SODIUM SERPL-SCNC: 142 MMOL/L (ref 136–145)
SODIUM SERPL-SCNC: 143 MMOL/L (ref 136–145)
WBC # BLD AUTO: 12.01 K/UL (ref 3.9–12.7)

## 2023-12-12 PROCEDURE — 85730 THROMBOPLASTIN TIME PARTIAL: CPT | Performed by: INTERNAL MEDICINE

## 2023-12-12 PROCEDURE — 97116 GAIT TRAINING THERAPY: CPT

## 2023-12-12 PROCEDURE — 11000001 HC ACUTE MED/SURG PRIVATE ROOM

## 2023-12-12 PROCEDURE — 25000003 PHARM REV CODE 250: Performed by: STUDENT IN AN ORGANIZED HEALTH CARE EDUCATION/TRAINING PROGRAM

## 2023-12-12 PROCEDURE — 25000003 PHARM REV CODE 250: Performed by: INTERNAL MEDICINE

## 2023-12-12 PROCEDURE — 80048 BASIC METABOLIC PNL TOTAL CA: CPT | Mod: 91 | Performed by: FAMILY MEDICINE

## 2023-12-12 PROCEDURE — 97530 THERAPEUTIC ACTIVITIES: CPT

## 2023-12-12 PROCEDURE — 25000003 PHARM REV CODE 250: Performed by: HOSPITALIST

## 2023-12-12 PROCEDURE — 80048 BASIC METABOLIC PNL TOTAL CA: CPT | Performed by: HOSPITALIST

## 2023-12-12 PROCEDURE — 85025 COMPLETE CBC W/AUTO DIFF WBC: CPT | Performed by: INTERNAL MEDICINE

## 2023-12-12 PROCEDURE — 36415 COLL VENOUS BLD VENIPUNCTURE: CPT | Performed by: FAMILY MEDICINE

## 2023-12-12 PROCEDURE — 83735 ASSAY OF MAGNESIUM: CPT | Performed by: FAMILY MEDICINE

## 2023-12-12 PROCEDURE — 93010 ELECTROCARDIOGRAM REPORT: CPT | Mod: ,,, | Performed by: INTERNAL MEDICINE

## 2023-12-12 PROCEDURE — 93005 ELECTROCARDIOGRAM TRACING: CPT

## 2023-12-12 PROCEDURE — 36415 COLL VENOUS BLD VENIPUNCTURE: CPT | Performed by: INTERNAL MEDICINE

## 2023-12-12 PROCEDURE — 63600175 PHARM REV CODE 636 W HCPCS: Performed by: STUDENT IN AN ORGANIZED HEALTH CARE EDUCATION/TRAINING PROGRAM

## 2023-12-12 PROCEDURE — 99232 SBSQ HOSP IP/OBS MODERATE 35: CPT | Mod: ,,, | Performed by: PHYSICIAN ASSISTANT

## 2023-12-12 RX ORDER — POTASSIUM CHLORIDE 20 MEQ/1
40 TABLET, EXTENDED RELEASE ORAL 2 TIMES DAILY
Status: COMPLETED | OUTPATIENT
Start: 2023-12-12 | End: 2023-12-13

## 2023-12-12 RX ORDER — LANOLIN ALCOHOL/MO/W.PET/CERES
400 CREAM (GRAM) TOPICAL DAILY
Status: DISCONTINUED | OUTPATIENT
Start: 2023-12-12 | End: 2023-12-17

## 2023-12-12 RX ORDER — BISACODYL 5 MG
5 TABLET, DELAYED RELEASE (ENTERIC COATED) ORAL ONCE
Status: COMPLETED | OUTPATIENT
Start: 2023-12-12 | End: 2023-12-12

## 2023-12-12 RX ORDER — AMOXICILLIN 250 MG
2 CAPSULE ORAL 2 TIMES DAILY
Status: DISCONTINUED | OUTPATIENT
Start: 2023-12-12 | End: 2023-12-19 | Stop reason: HOSPADM

## 2023-12-12 RX ORDER — METOLAZONE 5 MG/1
5 TABLET ORAL DAILY
Status: DISCONTINUED | OUTPATIENT
Start: 2023-12-12 | End: 2023-12-16

## 2023-12-12 RX ORDER — POTASSIUM CHLORIDE 20 MEQ/1
40 TABLET, EXTENDED RELEASE ORAL ONCE
Status: COMPLETED | OUTPATIENT
Start: 2023-12-12 | End: 2023-12-12

## 2023-12-12 RX ORDER — FACIAL-BODY WIPES
10 EACH TOPICAL DAILY PRN
Status: DISCONTINUED | OUTPATIENT
Start: 2023-12-12 | End: 2023-12-19 | Stop reason: HOSPADM

## 2023-12-12 RX ADMIN — POTASSIUM CHLORIDE 40 MEQ: 1500 TABLET, EXTENDED RELEASE ORAL at 11:12

## 2023-12-12 RX ADMIN — FUROSEMIDE 60 MG: 10 INJECTION, SOLUTION INTRAMUSCULAR; INTRAVENOUS at 10:12

## 2023-12-12 RX ADMIN — ASPIRIN 81 MG CHEWABLE TABLET 81 MG: 81 TABLET CHEWABLE at 09:12

## 2023-12-12 RX ADMIN — CETIRIZINE HYDROCHLORIDE 10 MG: 10 TABLET, FILM COATED ORAL at 09:12

## 2023-12-12 RX ADMIN — BISACODYL 5 MG: 5 TABLET, COATED ORAL at 05:12

## 2023-12-12 RX ADMIN — METOLAZONE 5 MG: 5 TABLET ORAL at 11:12

## 2023-12-12 RX ADMIN — LISINOPRIL 20 MG: 20 TABLET ORAL at 09:12

## 2023-12-12 RX ADMIN — HEPARIN SODIUM 17 UNITS/KG/HR: 10000 INJECTION, SOLUTION INTRAVENOUS at 05:12

## 2023-12-12 RX ADMIN — HEPARIN SODIUM 17 UNITS/KG/HR: 10000 INJECTION, SOLUTION INTRAVENOUS at 07:12

## 2023-12-12 RX ADMIN — POTASSIUM CHLORIDE 40 MEQ: 1500 TABLET, EXTENDED RELEASE ORAL at 09:12

## 2023-12-12 RX ADMIN — METOPROLOL SUCCINATE 50 MG: 50 TABLET, EXTENDED RELEASE ORAL at 09:12

## 2023-12-12 RX ADMIN — FUROSEMIDE 60 MG: 10 INJECTION, SOLUTION INTRAMUSCULAR; INTRAVENOUS at 09:12

## 2023-12-12 RX ADMIN — FUROSEMIDE 60 MG: 10 INJECTION, SOLUTION INTRAMUSCULAR; INTRAVENOUS at 12:12

## 2023-12-12 RX ADMIN — MAGNESIUM OXIDE TAB 400 MG (241.3 MG ELEMENTAL MG) 400 MG: 400 (241.3 MG) TAB at 11:12

## 2023-12-12 RX ADMIN — POLYETHYLENE GLYCOL 3350 17 G: 17 POWDER, FOR SOLUTION ORAL at 09:12

## 2023-12-12 RX ADMIN — SENNOSIDES AND DOCUSATE SODIUM 2 TABLET: 8.6; 5 TABLET ORAL at 09:12

## 2023-12-12 RX ADMIN — METHIMAZOLE 5 MG: 5 TABLET ORAL at 09:12

## 2023-12-12 RX ADMIN — METOPROLOL SUCCINATE 50 MG: 50 TABLET, EXTENDED RELEASE ORAL at 10:12

## 2023-12-12 NOTE — PT/OT/SLP EVAL
Occupational Therapy   Evaluation    Name: Roland Ng  MRN: [id number]  Admitting Diagnosis: SOB (shortness of breath)  Recent Surgery: * No surgery found *      Recommendations:     Discharge Recommendations: Moderate Intensity Therapy  Discharge Equipment Recommendations:  walker, rolling, to be determined by next level of care  Barriers to discharge:  Decreased caregiver support    Assessment:     Roland Ng is a 68 y.o. male with a medical diagnosis of SOB (shortness of breath).  He presents with the following performance deficits affecting function: weakness, impaired endurance, impaired self care skills, impaired functional mobility, gait instability, impaired balance, decreased lower extremity function, decreased safety awareness, edema, impaired cardiopulmonary response to activity.      Rehab Prognosis: Good; patient would benefit from acute skilled OT services to address these deficits and reach maximum level of function.       Plan:     Patient to be seen 2 x/week to address the above listed problems via self-care/home management, therapeutic activities, therapeutic exercises  Plan of Care Expires: 12/25/23  Plan of Care Reviewed with: patient    Subjective     Chief Complaint: SOB  Patient/Family Comments/goals: get better, return to PLOF    Occupational Profile:  Living Environment: lives with son in a 2 story house with 3 steps to enter. Pt's BA/BR on 1st floor. Pt's son works during the day.  Previous level of function: Pt requires assist with ADLs.  Pt Mod (I) with functional mobility using [facility] community distances, but pt reports increased difficulty with ambulation recently.   Roles and Routines: drives  Equipment Used at Home: cane, straight, shower chair  Assistance upon Discharge: son    Pain/Comfort:  Pain Rating 1: 0/10    Objective:     Communicated with: Nurse and epic chart review prior to session.  Patient found HOB elevated with peripheral IV, telemetry upon OT entry to  room.    General Precautions: Standard, fall  Orthopedic Precautions: N/A  Braces: N/A  Respiratory Status: Room air    Occupational Performance:    Bed Mobility:    Patient completed Rolling/Turning to Left with  maximal assistance  Patient completed Supine to Sit with maximal assistance and 2 persons  Forward scoot to EOB with Max A.    Functional Mobility/Transfers:  Patient completed Sit <> Stand Transfer with moderate assistance  with  rolling walker   Patient completed Bed <> Chair Transfer using Stand Pivot technique with minimum assistance with rolling walker    Activities of Daily Living:  Upper Body Dressing: minimum assistance jess gown EOB  Lower Body Dressing: total assistance jess socks EOB    Cognitive/Visual Perceptual:  Cognitive/Psychosocial Skills:     -       Oriented to: Person, Place, Time, and Situation   -       Follows Commands/attention:Follows multistep  commands  -       Communication: clear/fluent  -       Memory: No Deficits noted  -       Safety awareness/insight to disability: impaired     Physical Exam:  Edema:  BLE lymphedema  Sensation:    -       Intact  Upper Extremity Range of Motion:     -       Right Upper Extremity: WNL  -       Left Upper Extremity: WNL  Upper Extremity Strength:    -       Right Upper Extremity: 4+/5 grossly  -       Left Upper Extremity: 4+/5 grossly   Strength:    -       Right Upper Extremity: WFL  -       Left Upper Extremity: WFL    AMPAC 6 Click ADL:  AMPAC Total Score: 17    Treatment & Education:  Pt's SpO2 94% in supine and 97& while sitting in chair.   Pt demonstrating increased SOB with exertion; educated pt on pursed lip breathing technique and importance of activity pacing. Patient educated on role of OT in acute setting and benefits of participation. Educated on techniques to use to increase independence and decrease fall risk with functional transfers. Educated on importance of OOB activity and calling for A to transfer back to bed.  Encouraged completion of B UE AROM therex throughout the day to tolerance to increase functional strength and activity tolerance. Educated patient on importance of increased tolerance to upright position and direct impact on CV endurance and strength. Patient encouraged to sit up in chair for a minimum of 2 consecutive hours per day. Patient stated understanding and in agreement with POC.     Patient left up in chair with all lines intact and call button in reach    GOALS:   Multidisciplinary Problems       Occupational Therapy Goals          Problem: Occupational Therapy    Goal Priority Disciplines Outcome Interventions   Occupational Therapy Goal     OT, PT/OT     Description: Goals to be met by: 12/25/23     Patient will increase functional independence with ADLs by performing:    UE Dressing with Stand-by Assistance.  Grooming while standing at sink with Contact Guard Assistance.  Toileting from bedside commode with Set-up Assistance for hygiene and clothing management.   Toilet transfer to bedside commode with Supervision with RW.  Upper extremity exercise program x15 reps per handout, with independence.                         History:     Past Medical History:   Diagnosis Date    CAD (coronary artery disease)     had MI 2014    Chronic congestive heart failure 01/13/2021    Diabetes mellitus with no complication     Hypertension associated with diabetes     Hyperthyroidism 01/13/2021    Morbid obesity with body mass index (BMI) of 40.0 to 44.9 in adult     Paroxysmal A-fib          Past Surgical History:   Procedure Laterality Date    APPENDECTOMY      Twice       Time Tracking:     OT Date of Treatment: 12/11/23  OT Start Time: 1200  OT Stop Time: 1230  OT Total Time (min): 30 min    Billable Minutes:Evaluation 15  Therapeutic Activity 15    12/11/2023  Gris Waldron OT

## 2023-12-12 NOTE — SUBJECTIVE & OBJECTIVE
Review of Systems   Constitutional: Positive for malaise/fatigue.   HENT: Negative.     Eyes: Negative.    Cardiovascular:  Positive for dyspnea on exertion and leg swelling.   Respiratory:  Positive for shortness of breath.    Endocrine: Negative.    Hematologic/Lymphatic: Negative.    Skin: Negative.    Musculoskeletal: Negative.    Gastrointestinal:  Positive for bloating.   Genitourinary:         Scrotal edema     Neurological: Negative.    Psychiatric/Behavioral: Negative.     Allergic/Immunologic: Negative.      Objective:     Vital Signs (Most Recent):  Temp: 98.4 °F (36.9 °C) (12/12/23 1205)  Pulse: 85 (12/12/23 1205)  Resp: 15 (12/12/23 1205)  BP: 131/73 (12/12/23 1205)  SpO2: 97 % (12/12/23 1205) Vital Signs (24h Range):  Temp:  [98 °F (36.7 °C)-98.4 °F (36.9 °C)] 98.4 °F (36.9 °C)  Pulse:  [] 85  Resp:  [15-20] 15  SpO2:  [95 %-100 %] 97 %  BP: (119-146)/(62-74) 131/73     Weight: (!) 145.1 kg (319 lb 14.2 oz)  Body mass index is 44.62 kg/m².     SpO2: 97 %         Intake/Output Summary (Last 24 hours) at 12/12/2023 1301  Last data filed at 12/12/2023 1010  Gross per 24 hour   Intake 324 ml   Output 1525 ml   Net -1201 ml       Lines/Drains/Airways       Peripheral Intravenous Line  Duration                  Peripheral IV - Single Lumen 12/11/23 0120 20 G Anterior;Left Forearm 1 day                       Physical Exam  Vitals and nursing note reviewed.   Constitutional:       General: He is not in acute distress.     Appearance: He is well-developed. He is obese. He is not diaphoretic.   HENT:      Head: Normocephalic and atraumatic.   Eyes:      General:         Right eye: No discharge.         Left eye: No discharge.      Pupils: Pupils are equal, round, and reactive to light.   Neck:      Vascular: JVD present.   Cardiovascular:      Rate and Rhythm: Normal rate and regular rhythm.      Heart sounds: Normal heart sounds, S1 normal and S2 normal. No murmur heard.  Pulmonary:      Effort:  "Pulmonary effort is normal. No respiratory distress.      Breath sounds: Normal breath sounds. No wheezing or rales.   Abdominal:      General: There is distension.   Musculoskeletal:      Cervical back: Neck supple.      Right lower leg: Edema present.      Left lower leg: Edema present.   Skin:     General: Skin is warm and dry.      Findings: No erythema.   Neurological:      Mental Status: He is alert and oriented to person, place, and time.   Psychiatric:         Mood and Affect: Mood normal.         Behavior: Behavior normal.         Thought Content: Thought content normal.            Significant Labs: CMP   Recent Labs   Lab 12/11/23  0310 12/12/23  0541    143   K 3.4* 3.4*    104   CO2 25 29   * 124*   BUN 22 17   CREATININE 1.1 1.0   CALCIUM 8.3* 8.7   ANIONGAP 13 10   , CBC   Recent Labs   Lab 12/11/23  0310 12/12/23  1003   WBC 11.15 12.01   HGB 13.8* 15.0   HCT 41.7 45.6    194   , Troponin No results for input(s): "TROPONINI" in the last 48 hours., and All pertinent lab results from the last 24 hours have been reviewed.    Significant Imaging: Echocardiogram: Transthoracic echo (TTE) complete (Cupid Only):   Results for orders placed or performed during the hospital encounter of 12/09/23   Echo   Result Value Ref Range    BSA 2.62 m2    LVOT stroke volume 44.74 cm3    LVIDd 5.31 3.5 - 6.0 cm    LV Systolic Volume 81.84 mL    LV Systolic Volume Index 32.5 mL/m2    LVIDs 4.27 (A) 2.1 - 4.0 cm    LV Diastolic Volume 135.86 mL    LV Diastolic Volume Index 53.91 mL/m2    IVS 1.28 (A) 0.6 - 1.1 cm    LVOT diameter 2.07 cm    LVOT area 3.4 cm2    FS 20 (A) 28 - 44 %    Left Ventricle Relative Wall Thickness 0.49 cm    Posterior Wall 1.29 (A) 0.6 - 1.1 cm    LV mass 283.13 g    LV Mass Index 112 g/m2    MV Peak E Ishmael 1.08 m/s    TDI LATERAL 0.11 m/s    TDI SEPTAL 0.08 m/s    E/E' ratio 11.37 m/s    MV Peak A Ishmael 0.23 m/s    TR Max Ishmael 2.78 m/s    E/A ratio 4.70     IVRT 31.40 msec    " E wave deceleration time 167.06 msec    LV SEPTAL E/E' RATIO 13.50 m/s    LV LATERAL E/E' RATIO 9.82 m/s    LVOT peak ria 0.68 m/s    Left Ventricular Outflow Tract Mean Velocity 0.44 cm/s    Left Ventricular Outflow Tract Mean Gradient 0.91 mmHg    RV mid diameter 4.02 cm    RVOT peak VTI 9.8 cm    TAPSE 1.70 cm    LA size 5.35 cm    Left Atrium Major Axis 6.56 cm    RA Major Axis 5.76 cm    AV mean gradient 3 mmHg    AV peak gradient 4 mmHg    Ao peak ria 1.04 m/s    Ao VTI 17.90 cm    LVOT peak VTI 13.30 cm    AV valve area 2.50 cm²    AV Velocity Ratio 0.65     AV index (prosthetic) 0.74     MARILYN by Velocity Ratio 2.20 cm²    Mr max ria 4.52 m/s    MV stenosis pressure 1/2 time 48.45 ms    MV valve area p 1/2 method 4.54 cm2    Triscuspid Valve Regurgitation Peak Gradient 31 mmHg    PV mean gradient 1 mmHg    RVOT peak ria 0.56 m/s    Ao root annulus 3.22 cm    STJ 3.20 cm    Ascending aorta 3.27 cm    IVC diameter 1.93 cm    Mean e' 0.10 m/s    ZLVIDS -5.62     ZLVIDD -10.17     LA WIDTH 4.7 cm    RA Width 4.4 cm    EF 40 %    TV resting pulmonary artery pressure 39 mmHg    RV TB RVSP 11 mmHg    Est. RA pres 8 mmHg    Narrative      Left Ventricle: The left ventricle is normal in size. Normal wall   thickness. There is concentric remodeling. Mild global hyperkinesis   present. There is reduced systolic function. Ejection fraction by visual   approximation is 40%. There is normal diastolic function.    Right Ventricle: Moderate right ventricular enlargement. Wall thickness   is normal. Systolic function is moderately reduced.    Mitral Valve: There is mild regurgitation.    Tricuspid Valve: There is moderate regurgitation.    Pulmonary Artery: The estimated pulmonary artery systolic pressure is   39 mmHg.    IVC/SVC: Intermediate venous pressure at 8 mmHg.     , EKG: Reviewed, and X-Ray: CXR: X-Ray Chest 1 View (CXR): No results found for this visit on 12/09/23. and X-Ray Chest PA and Lateral (CXR): No results  found for this visit on 12/09/23.

## 2023-12-12 NOTE — ASSESSMENT & PLAN NOTE
Patient with Paroxysmal (<7 days) atrial fibrillation which is uncontrolled currently with Beta Blocker and Calcium Channel Blocker. Patient is currently in atrial fibrillation.[id number] Score: 3. Anticoagulation indicated. Anticoagulation done with Eliquis .

## 2023-12-12 NOTE — PT/OT/SLP PROGRESS
Physical Therapy Treatment    Patient Name:  Roland Ng   MRN:  [id number]    Recommendations:     Discharge Recommendations: Moderate Intensity Therapy  Discharge Equipment Recommendations: walker, rolling, to be determined by next level of care  Barriers to discharge: None    Assessment:     Roland Ng is a 68 y.o. male admitted with a medical diagnosis of Atrial flutter.  He presents with the following impairments/functional limitations: weakness, impaired endurance, impaired functional mobility, gait instability, impaired balance, decreased safety awareness, decreased lower extremity function, decreased coordination, decreased ROM, impaired cardiopulmonary response to activity.    Rehab Prognosis: Good; patient would benefit from acute skilled PT services to address these deficits and reach maximum level of function.    Recent Surgery: Procedure(s) (LRB):  Transesophageal echo (ZOE) intra-procedure log documentation (N/A)      Plan:     During this hospitalization, patient to be seen 3 x/week to address the identified rehab impairments via gait training, therapeutic activities, therapeutic exercises and progress toward the following goals:    Plan of Care Expires:  12/25/23    Subjective     Chief Complaint: Pt is motivated to participate, reports being OOB in the chair most of the morning  Patient/Family Comments/goals: to return home  Pain/Comfort:  Pain Rating 1: 0/10      Objective:     Communicated with nurse and epic chart review prior to session.  Patient found sitting edge of bed with peripheral IV, telemetry upon PT entry to room.     General Precautions: Standard, fall  Orthopedic Precautions: N/A  Braces: N/A  Respiratory Status: Room air     Functional Mobility:  Gait belt applied - Yes  Bed Mobility  Seated EOB at start of session and returned to chair  Transfers  Sit to Stand: minimum assistance with rolling walker  Bed to Chair: contact guard assistance with rolling walker  "using Step Transfer  Gait  Patient ambulated 40ft x2 with rolling walker and contact guard assistance. Patient demonstrates occasional unsteady gait. No c/o dizziness, mild SOB with exertion, educated about pursed lip breathing technique and cued for use with mobility, standing rest breaks required. All lines remained intact throughout ambulation trail.  Balance  Sitting: supervision  Standing: contact guard assistance    AM-PAC 6 CLICK MOBILITY  Turning over in bed (including adjusting bedclothes, sheets and blankets)?: 1 (NT)  Sitting down on and standing up from a chair with arms (e.g., wheelchair, bedside commode, etc.): 3  Moving from lying on back to sitting on the side of the bed?: 1 (NT)  Moving to and from a bed to a chair (including a wheelchair)?: 3  Need to walk in hospital room?: 3  Climbing 3-5 steps with a railing?: 1 (NT)  Basic Mobility Total Score: 12       Treatment & Education:  Reviewed role of PT in acute care and POC. Pt tolerated interventions well. Reviewed importance of OOB activities, activity pacing, and HEP (marching/hip flex, hip abd, heel slides/LAQ, quad sets, ankle pumps - printout given) in order to maintain/regain strength. Encouraged to sit up in chair for all meals. Reviewed proper use of RW for safety and to reduce risk of falling. Reviewed "call don't fall" policy and increased risk of falling due to weakness, instructed to utilize call bell for assistance with all transfers. Pt agreeable to all requests.    Patient left up in chair with all lines intact and call button in reach.    GOALS:   Multidisciplinary Problems       Physical Therapy Goals          Problem: Physical Therapy    Goal Priority Disciplines Outcome Goal Variances Interventions   Physical Therapy Goal     PT, PT/OT Ongoing, Progressing     Description: Goals to be met by 12/25/23.  1. Pt will complete bed mobility MIN A.  2. Pt will complete sit to stand CGA.  3. Pt will ambulate 200ft CGA using RW.  4. Pt " will increase AMPAC score by 2 points to progress functional mobility.                       Time Tracking:     PT Received On: 12/12/23  PT Start Time: 1100     PT Stop Time: 1125  PT Total Time (min): 25 min     Billable Minutes: Gait Training 15min and Therapeutic Activity 10min    Treatment Type: Treatment  PT/PTA: PT     Number of PTA visits since last PT visit: 0     12/12/2023

## 2023-12-12 NOTE — PT/OT/SLP PROGRESS
Occupational Therapy  Treatment    Roland Ng   MRN: [id number]   Admitting Diagnosis: Atrial flutter    OT Date of Treatment: 12/12/23   OT Start Time: 1115  OT Stop Time: 1145  OT Total Time (min): 30 min    Billable Minutes:  Therapeutic Activity 30 MINUTES    OT/FEDERICO: OT          General Precautions: Standard, fall  Orthopedic Precautions: N/A  Braces: N/A  Respiratory Status: Room air         Subjective:  Communicated with NURSE AND EPIC CHART REVIEW prior to session.    Pain/Comfort  Pain Rating 1: 0/10    Objective:  Patient found with: peripheral IV, telemetry     Functional Mobility:  Transfers:  CGA WITH SIT<> STAND TRANFERS   Functional Ambulation:  PT AMBULATED 40 FEET X 2   WITH ROLLING WALKER    Activities of Daily Living:  LE MAX A WITH REKHA SOCKS    Balance:   Static Sit: GOOD: Takes MODERATE challenges from all directions  Dynamic Sit: GOOD-: Incosistently Maintains balance through MODERATE excursions of active trunk movement,     Static Stand: FAIR: Maintains without assist but unable to take challenges  Dynamic stand: FAIR: Needs CONTACT GUARD during gait    Therapeutic Activities and Exercises:  Educated patient on importance of increased tolerance to upright position and direct impact on CV endurance and strength. Patient encouraged to sit up in chair/ EOB, for a minimum of 2 consecutive hours including for all meals... Re enforced importance of utilizing call light to meet needs in room and not attempt to get up without staff assistance. Patient verbalized understanding and agreed to comply.           AM-PAC 6 CLICK ADL   How much help from another person does this patient currently need?   1 = Unable, Total/Dependent Assistance  2 = A lot, Maximum/Moderate Assistance  3 = A little, Minimum/Contact Guard/Supervision  4 = None, Modified Evans/Independent    Putting on and taking off regular lower body clothing? : 2  Bathing (including washing, rinsing, drying)?: 2  Toileting,  "which includes using toilet, bedpan, or urinal? : 2  Putting on and taking off regular upper body clothing?: 3  Taking care of personal grooming such as brushing teeth?: 3  Eating meals?: 4  Daily Activity Total Score: 16     AM-PAC Raw Score CMS "G-Code Modifier Level of Impairment Assistance   6 % Total / Unable   7 - 8 CM 80 - 100% Maximal Assist   9-13 CL 60 - 80% Moderate Assist   14 - 19 CK 40 - 60% Moderate Assist   20 - 22 CJ 20 - 40% Minimal Assist   23 CI 1-20% SBA / CGA   24 CH 0% Independent/ Mod I       Patient left up in chair with all lines intact, call button in reach, and NURSE notified    ASSESSMENT:  Roland Ng is a 68 y.o. male with a medical diagnosis of Atrial flutter and presents with DEBILITY AND GENERALIZED WEAKNESS.    Rehab identified problem list/impairments:  weakness, impaired functional mobility, impaired cognition, decreased safety awareness, impaired endurance, gait instability, impaired balance, impaired self care skills    Rehab potential is good.    Activity tolerance: Good    Discharge recommendations: Moderate Intensity Therapy   Barriers to discharge: Barriers to Discharge: Decreased caregiver support    Equipment recommendations: walker, rolling    GOALS:   Multidisciplinary Problems       Occupational Therapy Goals          Problem: Occupational Therapy    Goal Priority Disciplines Outcome Interventions   Occupational Therapy Goal     OT, PT/OT     Description: Goals to be met by: 12/25/23     Patient will increase functional independence with ADLs by performing:    UE Dressing with Stand-by Assistance.  Grooming while standing at sink with Contact Guard Assistance.  Toileting from bedside commode with Set-up Assistance for hygiene and clothing management.   Toilet transfer to bedside commode with Supervision with RW.  Upper extremity exercise program x15 reps per handout, with independence.                         Plan:  Patient to be seen 2 x/week to " address the above listed problems via self-care/home management, therapeutic activities, therapeutic exercises  Plan of Care expires: 12/25/23  Plan of Care reviewed with: patient         12/12/2023

## 2023-12-12 NOTE — PLAN OF CARE
Pt tolerated interventions well. Required MIN A for STS, ambulated 40ft x2 CGA using RW. Recommending moderate intensity therapy upon d/c.

## 2023-12-12 NOTE — PROGRESS NOTES
[facility]  Cardiology  Progress Note    Patient Name: Roland Ng  MRN: [id number]  Admission Date: 12/9/2023  Hospital Length of Stay: 2 days  Code Status: Full Code   Attending Physician: Solange Blanchard DO   Primary Care Physician: Janis Green NP  Expected Discharge Date:   Principal Problem:Atrial flutter    Subjective:   HPI:  68 year old  male with PMHX of CHF and atrial fibrillation presented  to ED with worsening constant SOB x 1 month. Pt  reports sleeping with four pillows and waking up with SOB in the night.  Associated sxs include  intermittent sharp chest pain,  dizziness,  abdominal swelling and BLE  swelling. Pt states he is on a fluid  pill he takes BID since BLE  swelling and  venous stasis onset in 2016. Pt states abdominal swelling is new. Pt was seen in [facility]  ED on 12/523 for SOB and was instructed  to double Lasix dose. Noted HR  was in 130s in aflutter on 12/5, was seen at [facility].       Troponin was 0.031 yesterday. . Potassium  3.4. LFTs are elevated. LST 72/ WBC is 13.   EKG shows atrial flutter with variable AV block, Right bundle branch block, Septal infarct.   EF down to 40%, prior was 60% in March 2022       Hospital Course:   12/11/23-Patient seen and examined today, sitting up in bed. Feels ok. Remains SOB, needs continued IV diuresis. HR controlled. Labs reviewed.     12/12/23-Patient seen and examined today, sitting up in bed. Diuresing well. SOB improving but still significantly overloaded. HR variable. Labs reviewed. Creatinine 1.0, K 3.4. Needs ZOE/DCCV once compensated.        Review of Systems   Constitutional: Positive for malaise/fatigue.   HENT: Negative.     Eyes: Negative.    Cardiovascular:  Positive for dyspnea on exertion and leg swelling.   Respiratory:  Positive for shortness of breath.    Endocrine: Negative.    Hematologic/Lymphatic: Negative.    Skin: Negative.    Musculoskeletal: Negative.    Gastrointestinal:  Positive for bloating.    Genitourinary:         Scrotal edema     Neurological: Negative.    Psychiatric/Behavioral: Negative.     Allergic/Immunologic: Negative.      Objective:     Vital Signs (Most Recent):  Temp: 98.4 °F (36.9 °C) (12/12/23 1205)  Pulse: 85 (12/12/23 1205)  Resp: 15 (12/12/23 1205)  BP: 131/73 (12/12/23 1205)  SpO2: 97 % (12/12/23 1205) Vital Signs (24h Range):  Temp:  [98 °F (36.7 °C)-98.4 °F (36.9 °C)] 98.4 °F (36.9 °C)  Pulse:  [] 85  Resp:  [15-20] 15  SpO2:  [95 %-100 %] 97 %  BP: (119-146)/(62-74) 131/73     Weight: (!) 145.1 kg (319 lb 14.2 oz)  Body mass index is 44.62 kg/m².     SpO2: 97 %         Intake/Output Summary (Last 24 hours) at 12/12/2023 1301  Last data filed at 12/12/2023 1010  Gross per 24 hour   Intake 324 ml   Output 1525 ml   Net -1201 ml       Lines/Drains/Airways       Peripheral Intravenous Line  Duration                  Peripheral IV - Single Lumen 12/11/23 0120 20 G Anterior;Left Forearm 1 day                       Physical Exam  Vitals and nursing note reviewed.   Constitutional:       General: He is not in acute distress.     Appearance: He is well-developed. He is obese. He is not diaphoretic.   HENT:      Head: Normocephalic and atraumatic.   Eyes:      General:         Right eye: No discharge.         Left eye: No discharge.      Pupils: Pupils are equal, round, and reactive to light.   Neck:      Vascular: JVD present.   Cardiovascular:      Rate and Rhythm: Normal rate and regular rhythm.      Heart sounds: Normal heart sounds, S1 normal and S2 normal. No murmur heard.  Pulmonary:      Effort: Pulmonary effort is normal. No respiratory distress.      Breath sounds: Normal breath sounds. No wheezing or rales.   Abdominal:      General: There is distension.   Musculoskeletal:      Cervical back: Neck supple.      Right lower leg: Edema present.      Left lower leg: Edema present.   Skin:     General: Skin is warm and dry.      Findings: No erythema.   Neurological:      Mental  "Status: He is alert and oriented to person, place, and time.   Psychiatric:         Mood and Affect: Mood normal.         Behavior: Behavior normal.         Thought Content: Thought content normal.            Significant Labs: CMP   Recent Labs   Lab 12/11/23  0310 12/12/23  0541    143   K 3.4* 3.4*    104   CO2 25 29   * 124*   BUN 22 17   CREATININE 1.1 1.0   CALCIUM 8.3* 8.7   ANIONGAP 13 10   , CBC   Recent Labs   Lab 12/11/23  0310 12/12/23  1003   WBC 11.15 12.01   HGB 13.8* 15.0   HCT 41.7 45.6    194   , Troponin No results for input(s): "TROPONINI" in the last 48 hours., and All pertinent lab results from the last 24 hours have been reviewed.    Significant Imaging: Echocardiogram: Transthoracic echo (TTE) complete (Cupid Only):   Results for orders placed or performed during the hospital encounter of 12/09/23   Echo   Result Value Ref Range    BSA 2.62 m2    LVOT stroke volume 44.74 cm3    LVIDd 5.31 3.5 - 6.0 cm    LV Systolic Volume 81.84 mL    LV Systolic Volume Index 32.5 mL/m2    LVIDs 4.27 (A) 2.1 - 4.0 cm    LV Diastolic Volume 135.86 mL    LV Diastolic Volume Index 53.91 mL/m2    IVS 1.28 (A) 0.6 - 1.1 cm    LVOT diameter 2.07 cm    LVOT area 3.4 cm2    FS 20 (A) 28 - 44 %    Left Ventricle Relative Wall Thickness 0.49 cm    Posterior Wall 1.29 (A) 0.6 - 1.1 cm    LV mass 283.13 g    LV Mass Index 112 g/m2    MV Peak E Ishmael 1.08 m/s    TDI LATERAL 0.11 m/s    TDI SEPTAL 0.08 m/s    E/E' ratio 11.37 m/s    MV Peak A Ishmael 0.23 m/s    TR Max Ishmael 2.78 m/s    E/A ratio 4.70     IVRT 31.40 msec    E wave deceleration time 167.06 msec    LV SEPTAL E/E' RATIO 13.50 m/s    LV LATERAL E/E' RATIO 9.82 m/s    LVOT peak ishmael 0.68 m/s    Left Ventricular Outflow Tract Mean Velocity 0.44 cm/s    Left Ventricular Outflow Tract Mean Gradient 0.91 mmHg    RV mid diameter 4.02 cm    RVOT peak VTI 9.8 cm    TAPSE 1.70 cm    LA size 5.35 cm    Left Atrium Major Axis 6.56 cm    RA Major Axis 5.76 " cm    AV mean gradient 3 mmHg    AV peak gradient 4 mmHg    Ao peak ria 1.04 m/s    Ao VTI 17.90 cm    LVOT peak VTI 13.30 cm    AV valve area 2.50 cm²    AV Velocity Ratio 0.65     AV index (prosthetic) 0.74     MARILYN by Velocity Ratio 2.20 cm²    Mr max ria 4.52 m/s    MV stenosis pressure 1/2 time 48.45 ms    MV valve area p 1/2 method 4.54 cm2    Triscuspid Valve Regurgitation Peak Gradient 31 mmHg    PV mean gradient 1 mmHg    RVOT peak ria 0.56 m/s    Ao root annulus 3.22 cm    STJ 3.20 cm    Ascending aorta 3.27 cm    IVC diameter 1.93 cm    Mean e' 0.10 m/s    ZLVIDS -5.62     ZLVIDD -10.17     LA WIDTH 4.7 cm    RA Width 4.4 cm    EF 40 %    TV resting pulmonary artery pressure 39 mmHg    RV TB RVSP 11 mmHg    Est. RA pres 8 mmHg    Narrative      Left Ventricle: The left ventricle is normal in size. Normal wall   thickness. There is concentric remodeling. Mild global hyperkinesis   present. There is reduced systolic function. Ejection fraction by visual   approximation is 40%. There is normal diastolic function.    Right Ventricle: Moderate right ventricular enlargement. Wall thickness   is normal. Systolic function is moderately reduced.    Mitral Valve: There is mild regurgitation.    Tricuspid Valve: There is moderate regurgitation.    Pulmonary Artery: The estimated pulmonary artery systolic pressure is   39 mmHg.    IVC/SVC: Intermediate venous pressure at 8 mmHg.     , EKG: Reviewed, and X-Ray: CXR: X-Ray Chest 1 View (CXR): No results found for this visit on 12/09/23. and X-Ray Chest PA and Lateral (CXR): No results found for this visit on 12/09/23.  Assessment and Plan:   Patient who presents with atrial flutter/decompensated combined CHF. Still overloaded, continue IV diuresis. ZOE/DCCV once volume status improved.     * Atrial flutter  Rate controlled with toprol xl 50 bid  Once euvolemic, if still symptomatic with SOB, will consider ZOE/Cardioversion vs EP evaluation for flutter/fib  ablation    12/11/23  -Continue Toprol XL  -Continue heparin gtt  -Keep NPO after MN for possible ZOE/DCCV tmw    12/12/23  -Still volume overloaded, continue IV diuresis  -Continue BB, heparin gtt  -ZOE/DCCV once compensated    Lower extremity edema  -Assess response to IV diuresis  -CVI also contributing    Paroxysmal A-fib  -Continue anticoagulation and toprol xl    Morbid obesity with body mass index (BMI) of 40.0 to 44.9 in adult  -weight loss    Hypertension  Titrate medications     Acute exacerbation of CHF (congestive heart failure)  New onset CHF with EF 40%  Needs ischemia evaluation once euvolemic  Monitor I/Os  Continue IV diuresis     12/11/23  -Still overloaded  -Continue IV diuresis  -Continue ACEi, BB  -Strict I's/O's  -Ischemic workup once volume status improved    12/12/23  -Needs continued IV diuresis  -Continue BB, ACEi  -ZOE/DCCV and ischemic workup once compensated        VTE Risk Mitigation (From admission, onward)           Ordered     heparin 25,000 units in dextrose 5% 250 mL (100 units/mL) infusion LOW INTENSITY nomogram - [facility]  Continuous        Question:  Begin at (units/kg/hr)  Answer:  12    12/10/23 1752     heparin 25,000 units in dextrose 5% (100 units/ml) IV bolus from bag - ADDITIONAL PRN BOLUS - 60 units/kg  As needed (PRN)        Question:  Heparin Infusion Adjustment (DO NOT MODIFY ANSWER)  Answer:  \\[email or web address]\epic\Images\Pharmacy\HeparinInfusions\heparin LOW INTENSITY nomogram for [facility] [id number]    12/10/23 1752     heparin 25,000 units in dextrose 5% (100 units/ml) IV bolus from bag - ADDITIONAL PRN BOLUS - 30 units/kg  As needed (PRN)        Question:  Heparin Infusion Adjustment (DO NOT MODIFY ANSWER)  Answer:  \\[email or web address]\epic\Images\Pharmacy\HeparinInfusions\heparin LOW INTENSITY nomogram for [facility] [id number].pdf    12/10/23 1752     Reason for No Pharmacological VTE Prophylaxis  Once        Question:  Reasons:  Answer:  Already adequately anticoagulated on oral  Anticoagulants    12/10/23 0102     IP VTE HIGH RISK PATIENT  Once         12/10/23 0102     Place sequential compression device  Until discontinued         12/10/23 0102                    Marisa Pizano PA-C  Cardiology  [facility]

## 2023-12-12 NOTE — PROGRESS NOTES
[facility] Medicine  Progress Note    Patient Name: Roland Ng  MRN: [id number]  Patient Class: IP- Inpatient   Admission Date: 12/9/2023  Length of Stay: 2 days  Attending Physician: Solange Blanchard DO  Primary Care Provider: Janis Green NP        Subjective:     Principal Problem:Atrial flutter        HPI:  Roland Ng is a 68 y.o. male with a PMH  has a past medical history of CAD (coronary artery disease), Chronic congestive heart failure (01/13/2021), Diabetes mellitus with no complication, Hypertension associated with diabetes, Hyperthyroidism (01/13/2021), Morbid obesity with body mass index (BMI) of 40.0 to 44.9 in adult, and Paroxysmal A-fib. who presented to the ED for further evaluation of progressively worsening dyspnea/shortness a breath was well as bilateral lower extremity, scrotal, and penile swelling/edema.  Patient reports significant history of heart failure and was previously followed by Dr. Decker but stated he is not seen him in quite some time.  Patient also reported being off his home medications for approximately 3-4 months prior to getting back on them due to insurance related issues and has been compliant over the past 3 months.  Patient also reports non adherence to fluid/dietary restrictions and states he drinks approximately 20 bottles of water daily.  He reported no known alleviating or aggravating factors noted and reported associated symptoms included dyspnea both at rest and on exertion, orthopnea, PND, and wheezing in addition to reported swelling as noted above.  Patient reports negative history of asthma/COPD, does not use home oxygen, and does not use home CPAP.  All other review of systems negative except as noted above.  Initial workup in the ED concerning for signs/symptoms of acute CHF exacerbation with patient being admitted to Hospital Medicine inpatient for continued medical management.      PCP: Janis Green  N.      Overview/Hospital Course:  Cardiology consulted.  Patient currently being treated for CHF exacerbation with IV diuretics.  Given persistent atrial flutter, planned for ZOE/cardioversion once euvolemic.    PT/OT recommends moderate intensity therapy on discharge. SW consulted for SNF placement    Interval History: No acute events overnight.  Seen and examined sitting up in chair with family present.  Still has shortness of breath with activities and swelling of legs, denies any complaints including chest pain, nausea, abdominal pain.  Admits that it has been a few days since he last had BM although continuing to tolerate diet without any issues.  Discussed with Cardiology, planned for continuing diuresis for another couple of days before proceeding with ZOE/cardioversion given persistent atrial flutter      Review of Systems  Objective:     Vital Signs (Most Recent):  Temp: 98.4 °F (36.9 °C) (12/12/23 1205)  Pulse: 85 (12/12/23 1205)  Resp: 15 (12/12/23 1205)  BP: 131/73 (12/12/23 1205)  SpO2: 97 % (12/12/23 1205) Vital Signs (24h Range):  Temp:  [98 °F (36.7 °C)-98.4 °F (36.9 °C)] 98.4 °F (36.9 °C)  Pulse:  [] 85  Resp:  [15-20] 15  SpO2:  [95 %-100 %] 97 %  BP: (119-146)/(62-74) 131/73     Weight: (!) 145.1 kg (319 lb 14.2 oz)  Body mass index is 44.62 kg/m².    Intake/Output Summary (Last 24 hours) at 12/12/2023 1546  Last data filed at 12/12/2023 1410  Gross per 24 hour   Intake 324 ml   Output 2175 ml   Net -1851 ml         Physical Exam  Vitals and nursing note reviewed. Exam conducted with a chaperone present.   Constitutional:       General: He is not in acute distress.     Appearance: He is obese. He is not ill-appearing or diaphoretic.   HENT:      Head: Normocephalic and atraumatic.      Right Ear: External ear normal.      Left Ear: External ear normal.      Nose: Nose normal.      Mouth/Throat:      Mouth: Mucous membranes are moist.   Eyes:      Extraocular Movements: Extraocular movements  intact.      Pupils: Pupils are equal, round, and reactive to light.   Cardiovascular:      Rate and Rhythm: Normal rate. Rhythm irregular.   Pulmonary:      Effort: Pulmonary effort is normal. No respiratory distress.      Breath sounds: Rales present. No wheezing.      Comments: On room air  Abdominal:      General: Bowel sounds are normal.      Palpations: Abdomen is soft.      Tenderness: There is no abdominal tenderness. There is no guarding or rebound.   Musculoskeletal:      Cervical back: Neck supple.      Right lower leg: Pitting Edema present.      Left lower leg: Pitting Edema present.      Comments: Lymphedema noted on bilateral lower extremities   Skin:     General: Skin is warm and dry.   Neurological:      General: No focal deficit present.      Mental Status: He is alert and oriented to person, place, and time.   Psychiatric:         Mood and Affect: Mood normal.             Significant Labs: All pertinent labs within the past 24 hours have been reviewed.  CBC:   Recent Labs   Lab 12/11/23  0310 12/12/23  1003   WBC 11.15 12.01   HGB 13.8* 15.0   HCT 41.7 45.6    194     CMP:   Recent Labs   Lab 12/11/23  0310 12/12/23  0541    143   K 3.4* 3.4*    104   CO2 25 29   * 124*   BUN 22 17   CREATININE 1.1 1.0   CALCIUM 8.3* 8.7   ANIONGAP 13 10     Magnesium:   Recent Labs   Lab 12/11/23  0310   MG 2.0       Significant Imaging: I have reviewed all pertinent imaging results/findings within the past 24 hours.    Assessment/Plan:      * Atrial flutter  Remains on heparin infusion  Planned for ZOE/cardioversion once more euvolemic as per Cardiology      Scrotal swelling  Ultrasound showed large bilateral hydrocele with extensive scrotal wall thickening or edema, no abscess   Likely secondary to excessive volume in setting of CHF exacerbation      Lower extremity edema  Multifactorial secondary to lymphedema and CHF exacerbation   Wound care consulted, recommended outpatient  referral to lymphedema clinic      SOB (shortness of breath)  In setting of CHF exacerbation  Improving although continues to have dyspnea with exertion       Paroxysmal A-fib  Patient with Paroxysmal (<7 days) atrial fibrillation which is uncontrolled currently with Beta Blocker and Calcium Channel Blocker. Patient is currently in atrial fibrillation.[id number] Score: 3. Anticoagulation indicated. Anticoagulation done with Eliquis .      Atherosclerosis of native coronary artery of native heart without angina pectoris  Patient with known CAD s/p  LHC without stent placement , which is controlled Will continue  Eliquis, ASA, and Statin and monitor for S/Sx of angina/ACS. Continue to monitor on telemetry.       Morbid obesity with body mass index (BMI) of 40.0 to 44.9 in adult  Body mass index is 44.62 kg/m². Morbid obesity complicates all aspects of disease management from diagnostic modalities to treatment. Weight loss encouraged and health benefits explained to patient.       Hypertension  Chronic, controlled. Latest blood pressure and vitals reviewed-     Temp:  [98 °F (36.7 °C)-98.4 °F (36.9 °C)]   Pulse:  []   Resp:  [15-20]   BP: (119-146)/(62-74)   SpO2:  [95 %-100 %] .   Home meds for hypertension were reviewed and noted below.   Hypertension Medications               amLODIPine (NORVASC) 10 MG tablet Take 1 tablet (10 mg total) by mouth once daily.    furosemide (LASIX) 40 MG tablet Take 1 tablet (40 mg total) by mouth once daily.    lisinopriL (PRINIVIL,ZESTRIL) 20 MG tablet Take 1 tablet (20 mg total) by mouth once daily.    metoprolol succinate (TOPROL-XL) 50 MG 24 hr tablet Take 1 tablet (50 mg total) by mouth once daily.          While in the hospital, will manage blood pressure as follows; Continue home antihypertensive regimen except for p.o. Lasix given he is currently on IV Lasix    Will utilize p.r.n. blood pressure medication only if patient's blood pressure greater than 160/100 and he  develops symptoms such as worsening chest pain or shortness of breath.      Acute exacerbation of CHF (congestive heart failure)  Patient is identified as having Diastolic (HFpEF) heart failure that is Acute on chronic. CHF is currently uncontrolled due to Continued edema of extremities and Dyspnea not returned to baseline after single doses of IV diuretic. Latest ECHO performed and demonstrates- Results for orders placed during the hospital encounter of 03/08/22    Echo    Interpretation Summary  · The left ventricle is normal in size with concentric hypertrophy and normal systolic function.  · Mild left atrial enlargement.  · Indeterminate left ventricular diastolic function.  · The estimated PA systolic pressure is 18 mmHg.  · Normal right ventricular size with normal right ventricular systolic function.  · Intermediate central venous pressure (8 mmHg).  · The estimated ejection fraction is 65%.  · Mild pulmonic regurgitation.  · Mild mitral regurgitation.  Continue Beta Blocker, ACE/ARB, and Furosemide and monitor clinical status closely. Monitor on telemetry. Patient is on CHF pathway.  Monitor strict Is&Os and daily weights.  Place on fluid restriction of 1.5 L. Cardiology has been consulted. Continue to stress to patient importance of self efficacy and  on diet for CHF. Last BNP reviewed- and noted below   Recent Labs   Lab 12/09/23  2302   *       -telemetry  -continue IV diuretics  -bipap qhs prn  -titrate oxygen therapy as needed         Hyperthyroidism  Patient has chronic hyperthyroidism. TFTs reviewed-   Lab Results   Component Value Date    TSH 0.083 (L) 12/10/2023     Although TSH is slightly low, free T4 is within normal limits  Will continue home methimazole  Outpatient follow-up for further monitoring and medication titration as needed      VTE Risk Mitigation (From admission, onward)           Ordered     heparin 25,000 units in dextrose 5% 250 mL (100 units/mL) infusion LOW  INTENSITY nomogram - [facility]  Continuous        Question:  Begin at (units/kg/hr)  Answer:  12    12/10/23 1752     heparin 25,000 units in dextrose 5% (100 units/ml) IV bolus from bag - ADDITIONAL PRN BOLUS - 60 units/kg  As needed (PRN)        Question:  Heparin Infusion Adjustment (DO NOT MODIFY ANSWER)  Answer:  \\[email or web address]\epic\Images\Pharmacy\HeparinInfusions\heparin LOW INTENSITY nomogram for [facility] [id number]    12/10/23 1752     heparin 25,000 units in dextrose 5% (100 units/ml) IV bolus from bag - ADDITIONAL PRN BOLUS - 30 units/kg  As needed (PRN)        Question:  Heparin Infusion Adjustment (DO NOT MODIFY ANSWER)  Answer:  \\[email or web address]\epic\Images\Pharmacy\HeparinInfusions\heparin LOW INTENSITY nomogram for [facility] [id number]    12/10/23 1752     Reason for No Pharmacological VTE Prophylaxis  Once        Question:  Reasons:  Answer:  Already adequately anticoagulated on oral Anticoagulants    12/10/23 0102     IP VTE HIGH RISK PATIENT  Once         12/10/23 0102     Place sequential compression device  Until discontinued         12/10/23 0102                    Discharge Planning   RABIA:      Code Status: Full Code   Is the patient medically ready for discharge?:     Reason for patient still in hospital (select all that apply): Patient trending condition, Treatment, and Consult recommendations  Discharge Plan A: Skilled Nursing Facility                  Solange Blanchard DO  Department of Hospital Medicine   [facility]

## 2023-12-12 NOTE — SUBJECTIVE & OBJECTIVE
Interval History: No acute events overnight.  Seen and examined sitting up in chair with family present.  Still has shortness of breath with activities and swelling of legs, denies any complaints including chest pain, nausea, abdominal pain.  Admits that it has been a few days since he last had BM although continuing to tolerate diet without any issues.  Discussed with Cardiology, planned for continuing diuresis for another couple of days before proceeding with ZOE/cardioversion given persistent atrial flutter      Review of Systems  Objective:     Vital Signs (Most Recent):  Temp: 98.4 °F (36.9 °C) (12/12/23 1205)  Pulse: 85 (12/12/23 1205)  Resp: 15 (12/12/23 1205)  BP: 131/73 (12/12/23 1205)  SpO2: 97 % (12/12/23 1205) Vital Signs (24h Range):  Temp:  [98 °F (36.7 °C)-98.4 °F (36.9 °C)] 98.4 °F (36.9 °C)  Pulse:  [] 85  Resp:  [15-20] 15  SpO2:  [95 %-100 %] 97 %  BP: (119-146)/(62-74) 131/73     Weight: (!) 145.1 kg (319 lb 14.2 oz)  Body mass index is 44.62 kg/m².    Intake/Output Summary (Last 24 hours) at 12/12/2023 1546  Last data filed at 12/12/2023 1410  Gross per 24 hour   Intake 324 ml   Output 2175 ml   Net -1851 ml         Physical Exam  Vitals and nursing note reviewed. Exam conducted with a chaperone present.   Constitutional:       General: He is not in acute distress.     Appearance: He is obese. He is not ill-appearing or diaphoretic.   HENT:      Head: Normocephalic and atraumatic.      Right Ear: External ear normal.      Left Ear: External ear normal.      Nose: Nose normal.      Mouth/Throat:      Mouth: Mucous membranes are moist.   Eyes:      Extraocular Movements: Extraocular movements intact.      Pupils: Pupils are equal, round, and reactive to light.   Cardiovascular:      Rate and Rhythm: Normal rate. Rhythm irregular.   Pulmonary:      Effort: Pulmonary effort is normal. No respiratory distress.      Breath sounds: Rales present. No wheezing.      Comments: On room  air  Abdominal:      General: Bowel sounds are normal.      Palpations: Abdomen is soft.      Tenderness: There is no abdominal tenderness. There is no guarding or rebound.   Musculoskeletal:      Cervical back: Neck supple.      Right lower leg: Pitting Edema present.      Left lower leg: Pitting Edema present.      Comments: Lymphedema noted on bilateral lower extremities   Skin:     General: Skin is warm and dry.   Neurological:      General: No focal deficit present.      Mental Status: He is alert and oriented to person, place, and time.   Psychiatric:         Mood and Affect: Mood normal.             Significant Labs: All pertinent labs within the past 24 hours have been reviewed.  CBC:   Recent Labs   Lab 12/11/23  0310 12/12/23  1003   WBC 11.15 12.01   HGB 13.8* 15.0   HCT 41.7 45.6    194     CMP:   Recent Labs   Lab 12/11/23  0310 12/12/23  0541    143   K 3.4* 3.4*    104   CO2 25 29   * 124*   BUN 22 17   CREATININE 1.1 1.0   CALCIUM 8.3* 8.7   ANIONGAP 13 10     Magnesium:   Recent Labs   Lab 12/11/23  0310   MG 2.0       Significant Imaging: I have reviewed all pertinent imaging results/findings within the past 24 hours.

## 2023-12-12 NOTE — PLAN OF CARE
12/12/23 1608   Post-Acute Status   Post-Acute Authorization Placement   Post-Acute Placement Status Pending payor review/awaiting authorization (if required)   Coverage healthy   Discharge Plan   Discharge Plan A Skilled Nursing Facility     ADEN spoke with pt regarding accepting snf facilities. Pt chose [facility]. ADEN told Wilbert at [facility] to submit for auth. Pending auth [facility].

## 2023-12-12 NOTE — ASSESSMENT & PLAN NOTE
New onset CHF with EF 40%  Needs ischemia evaluation once euvolemic  Monitor I/Os  Continue IV diuresis     12/11/23  -Still overloaded  -Continue IV diuresis  -Continue ACEi, BB  -Strict I's/O's  -Ischemic workup once volume status improved    12/12/23  -Needs continued IV diuresis  -Continue BB, ACEi  -ZOE/DCCV and ischemic workup once compensated

## 2023-12-12 NOTE — PLAN OF CARE
12/12/23 0908   Post-Acute Status   Post-Acute Authorization Placement   Post-Acute Placement Status Referrals Sent   Coverage HEALTHY BLUE   Discharge Plan   Discharge Plan A Skilled Nursing Facility     SW spoke to pt regarding SNF placement. Pt is in agreement with SNF placement. SW sent SNF referrals and called in locet. Pending SNF acceptance, 142.

## 2023-12-12 NOTE — ASSESSMENT & PLAN NOTE
Chronic, controlled. Latest blood pressure and vitals reviewed-     Temp:  [98 °F (36.7 °C)-98.4 °F (36.9 °C)]   Pulse:  []   Resp:  [15-20]   BP: (119-146)/(62-74)   SpO2:  [95 %-100 %] .   Home meds for hypertension were reviewed and noted below.   Hypertension Medications               amLODIPine (NORVASC) 10 MG tablet Take 1 tablet (10 mg total) by mouth once daily.    furosemide (LASIX) 40 MG tablet Take 1 tablet (40 mg total) by mouth once daily.    lisinopriL (PRINIVIL,ZESTRIL) 20 MG tablet Take 1 tablet (20 mg total) by mouth once daily.    metoprolol succinate (TOPROL-XL) 50 MG 24 hr tablet Take 1 tablet (50 mg total) by mouth once daily.          While in the hospital, will manage blood pressure as follows; Continue home antihypertensive regimen except for p.o. Lasix given he is currently on IV Lasix    Will utilize p.r.n. blood pressure medication only if patient's blood pressure greater than 160/100 and he develops symptoms such as worsening chest pain or shortness of breath.

## 2023-12-12 NOTE — ASSESSMENT & PLAN NOTE
Remains on heparin infusion  Planned for ZOE/cardioversion once more euvolemic as per Cardiology

## 2023-12-12 NOTE — ASSESSMENT & PLAN NOTE
Body mass index is 44.62 kg/m². Morbid obesity complicates all aspects of disease management from diagnostic modalities to treatment. Weight loss encouraged and health benefits explained to patient.

## 2023-12-12 NOTE — ASSESSMENT & PLAN NOTE
Rate controlled with toprol xl 50 bid  Once euvolemic, if still symptomatic with SOB, will consider ZOE/Cardioversion vs EP evaluation for flutter/fib ablation    12/11/23  -Continue Toprol XL  -Continue heparin gtt  -Keep NPO after MN for possible ZOE/DCCV tmw    12/12/23  -Still volume overloaded, continue IV diuresis  -Continue BB, heparin gtt  -ZOE/DCCV once compensated

## 2023-12-12 NOTE — PLAN OF CARE
Patient resting, no distress noted, Heparin drip infusing at 17units/kg/hr, patient alert and oriented x3, fall precautions in place with bed alarm on, patient on tele 8600 aflutter rate 84, chart check completed

## 2023-12-13 LAB
ANION GAP SERPL CALC-SCNC: 11 MMOL/L (ref 8–16)
APTT PPP: 34.6 SEC (ref 21–32)
BUN SERPL-MCNC: 18 MG/DL (ref 8–23)
CALCIUM SERPL-MCNC: 9.3 MG/DL (ref 8.7–10.5)
CHLORIDE SERPL-SCNC: 101 MMOL/L (ref 95–110)
CO2 SERPL-SCNC: 29 MMOL/L (ref 23–29)
CREAT SERPL-MCNC: 1 MG/DL (ref 0.5–1.4)
EST. GFR  (NO RACE VARIABLE): >60 ML/MIN/1.73 M^2
GLUCOSE SERPL-MCNC: 122 MG/DL (ref 70–110)
POTASSIUM SERPL-SCNC: 3.8 MMOL/L (ref 3.5–5.1)
SODIUM SERPL-SCNC: 141 MMOL/L (ref 136–145)

## 2023-12-13 PROCEDURE — 93010 ELECTROCARDIOGRAM REPORT: CPT | Mod: ,,, | Performed by: INTERNAL MEDICINE

## 2023-12-13 PROCEDURE — 21400001 HC TELEMETRY ROOM

## 2023-12-13 PROCEDURE — 80048 BASIC METABOLIC PNL TOTAL CA: CPT | Performed by: HOSPITALIST

## 2023-12-13 PROCEDURE — 25000003 PHARM REV CODE 250: Performed by: INTERNAL MEDICINE

## 2023-12-13 PROCEDURE — 25000003 PHARM REV CODE 250: Performed by: STUDENT IN AN ORGANIZED HEALTH CARE EDUCATION/TRAINING PROGRAM

## 2023-12-13 PROCEDURE — 36415 COLL VENOUS BLD VENIPUNCTURE: CPT | Performed by: HOSPITALIST

## 2023-12-13 PROCEDURE — 97530 THERAPEUTIC ACTIVITIES: CPT | Mod: CQ

## 2023-12-13 PROCEDURE — 36415 COLL VENOUS BLD VENIPUNCTURE: CPT | Performed by: INTERNAL MEDICINE

## 2023-12-13 PROCEDURE — 63600175 PHARM REV CODE 636 W HCPCS: Performed by: STUDENT IN AN ORGANIZED HEALTH CARE EDUCATION/TRAINING PROGRAM

## 2023-12-13 PROCEDURE — 85730 THROMBOPLASTIN TIME PARTIAL: CPT | Performed by: INTERNAL MEDICINE

## 2023-12-13 PROCEDURE — 93005 ELECTROCARDIOGRAM TRACING: CPT

## 2023-12-13 PROCEDURE — 25000003 PHARM REV CODE 250: Performed by: HOSPITALIST

## 2023-12-13 PROCEDURE — 97116 GAIT TRAINING THERAPY: CPT | Mod: CQ

## 2023-12-13 PROCEDURE — 99232 SBSQ HOSP IP/OBS MODERATE 35: CPT | Mod: 25,,, | Performed by: PHYSICIAN ASSISTANT

## 2023-12-13 PROCEDURE — 11000001 HC ACUTE MED/SURG PRIVATE ROOM

## 2023-12-13 RX ADMIN — METHIMAZOLE 5 MG: 5 TABLET ORAL at 09:12

## 2023-12-13 RX ADMIN — MAGNESIUM OXIDE TAB 400 MG (241.3 MG ELEMENTAL MG) 400 MG: 400 (241.3 MG) TAB at 09:12

## 2023-12-13 RX ADMIN — METOLAZONE 5 MG: 5 TABLET ORAL at 09:12

## 2023-12-13 RX ADMIN — POLYETHYLENE GLYCOL 3350 17 G: 17 POWDER, FOR SOLUTION ORAL at 09:12

## 2023-12-13 RX ADMIN — POTASSIUM CHLORIDE 40 MEQ: 1500 TABLET, EXTENDED RELEASE ORAL at 09:12

## 2023-12-13 RX ADMIN — ASPIRIN 81 MG CHEWABLE TABLET 81 MG: 81 TABLET CHEWABLE at 09:12

## 2023-12-13 RX ADMIN — SENNOSIDES AND DOCUSATE SODIUM 2 TABLET: 8.6; 5 TABLET ORAL at 09:12

## 2023-12-13 RX ADMIN — METOPROLOL SUCCINATE 50 MG: 50 TABLET, EXTENDED RELEASE ORAL at 09:12

## 2023-12-13 RX ADMIN — FUROSEMIDE 60 MG: 10 INJECTION, SOLUTION INTRAMUSCULAR; INTRAVENOUS at 09:12

## 2023-12-13 RX ADMIN — HEPARIN SODIUM 17 UNITS/KG/HR: 10000 INJECTION, SOLUTION INTRAVENOUS at 12:12

## 2023-12-13 RX ADMIN — LISINOPRIL 20 MG: 20 TABLET ORAL at 09:12

## 2023-12-13 RX ADMIN — CETIRIZINE HYDROCHLORIDE 10 MG: 10 TABLET, FILM COATED ORAL at 09:12

## 2023-12-13 RX ADMIN — FUROSEMIDE 60 MG: 10 INJECTION, SOLUTION INTRAMUSCULAR; INTRAVENOUS at 10:12

## 2023-12-13 NOTE — SUBJECTIVE & OBJECTIVE
Review of Systems   Constitutional: Positive for malaise/fatigue.   HENT: Negative.     Eyes: Negative.    Cardiovascular:  Positive for dyspnea on exertion and leg swelling.   Respiratory:  Positive for shortness of breath.    Endocrine: Negative.    Hematologic/Lymphatic: Negative.    Skin: Negative.    Musculoskeletal: Negative.    Gastrointestinal: Negative.    Genitourinary:         Scrotal swelling     Neurological: Negative.    Psychiatric/Behavioral: Negative.     Allergic/Immunologic: Negative.      Objective:     Vital Signs (Most Recent):  Temp: 97.7 °F (36.5 °C) (12/13/23 1255)  Pulse: 79 (12/13/23 1255)  Resp: 18 (12/13/23 1255)  BP: (!) 93/59 (12/13/23 1255)  SpO2: (!) 94 % (12/13/23 1255) Vital Signs (24h Range):  Temp:  [97.7 °F (36.5 °C)-98.7 °F (37.1 °C)] 97.7 °F (36.5 °C)  Pulse:  [62-98] 79  Resp:  [16-24] 18  SpO2:  [92 %-98 %] 94 %  BP: ()/(57-67) 93/59     Weight: (!) 143 kg (315 lb 4.1 oz)  Body mass index is 43.97 kg/m².     SpO2: (!) 94 %         Intake/Output Summary (Last 24 hours) at 12/13/2023 1408  Last data filed at 12/13/2023 0828  Gross per 24 hour   Intake 693.07 ml   Output 4225 ml   Net -3531.93 ml       Lines/Drains/Airways       Peripheral Intravenous Line  Duration                  Peripheral IV - Single Lumen 12/11/23 0120 20 G Anterior;Left Forearm 2 days                       Physical Exam  Vitals and nursing note reviewed.   Constitutional:       General: He is not in acute distress.     Appearance: Normal appearance. He is well-developed. He is obese. He is not diaphoretic.   HENT:      Head: Normocephalic and atraumatic.   Eyes:      General:         Right eye: No discharge.         Left eye: No discharge.      Pupils: Pupils are equal, round, and reactive to light.   Cardiovascular:      Rate and Rhythm: Normal rate. Rhythm irregularly irregular.      Heart sounds: S1 normal and S2 normal. No murmur heard.  Pulmonary:      Effort: Pulmonary effort is normal. No  "respiratory distress.      Breath sounds: Rales present. No wheezing.   Abdominal:      General: There is distension.      Tenderness: There is no rebound.   Musculoskeletal:      Right lower leg: Edema present.      Left lower leg: Edema present.   Skin:     General: Skin is warm and dry.      Findings: No erythema.   Neurological:      Mental Status: He is alert and oriented to person, place, and time.   Psychiatric:         Mood and Affect: Mood normal.         Behavior: Behavior normal.         Thought Content: Thought content normal.            Significant Labs: CMP   Recent Labs   Lab 12/12/23  0541 12/12/23  2300 12/13/23  0548    142 141   K 3.4* 3.6 3.8    102 101   CO2 29 28 29   * 136* 122*   BUN 17 19 18   CREATININE 1.0 1.2 1.0   CALCIUM 8.7 9.4 9.3   ANIONGAP 10 12 11   , CBC   Recent Labs   Lab 12/12/23  1003   WBC 12.01   HGB 15.0   HCT 45.6      , Troponin No results for input(s): "TROPONINI" in the last 48 hours., and All pertinent lab results from the last 24 hours have been reviewed.    Significant Imaging: Echocardiogram: Transthoracic echo (TTE) complete (Cupid Only):   Results for orders placed or performed during the hospital encounter of 12/09/23   Echo   Result Value Ref Range    BSA 2.62 m2    LVOT stroke volume 44.74 cm3    LVIDd 5.31 3.5 - 6.0 cm    LV Systolic Volume 81.84 mL    LV Systolic Volume Index 32.5 mL/m2    LVIDs 4.27 (A) 2.1 - 4.0 cm    LV Diastolic Volume 135.86 mL    LV Diastolic Volume Index 53.91 mL/m2    IVS 1.28 (A) 0.6 - 1.1 cm    LVOT diameter 2.07 cm    LVOT area 3.4 cm2    FS 20 (A) 28 - 44 %    Left Ventricle Relative Wall Thickness 0.49 cm    Posterior Wall 1.29 (A) 0.6 - 1.1 cm    LV mass 283.13 g    LV Mass Index 112 g/m2    MV Peak E Ishmael 1.08 m/s    TDI LATERAL 0.11 m/s    TDI SEPTAL 0.08 m/s    E/E' ratio 11.37 m/s    MV Peak A Ishmael 0.23 m/s    TR Max Ishmael 2.78 m/s    E/A ratio 4.70     IVRT 31.40 msec    E wave deceleration time 167.06 " msec    LV SEPTAL E/E' RATIO 13.50 m/s    LV LATERAL E/E' RATIO 9.82 m/s    LVOT peak ria 0.68 m/s    Left Ventricular Outflow Tract Mean Velocity 0.44 cm/s    Left Ventricular Outflow Tract Mean Gradient 0.91 mmHg    RV mid diameter 4.02 cm    RVOT peak VTI 9.8 cm    TAPSE 1.70 cm    LA size 5.35 cm    Left Atrium Major Axis 6.56 cm    RA Major Axis 5.76 cm    AV mean gradient 3 mmHg    AV peak gradient 4 mmHg    Ao peak ria 1.04 m/s    Ao VTI 17.90 cm    LVOT peak VTI 13.30 cm    AV valve area 2.50 cm²    AV Velocity Ratio 0.65     AV index (prosthetic) 0.74     MARILYN by Velocity Ratio 2.20 cm²    Mr max ria 4.52 m/s    MV stenosis pressure 1/2 time 48.45 ms    MV valve area p 1/2 method 4.54 cm2    Triscuspid Valve Regurgitation Peak Gradient 31 mmHg    PV mean gradient 1 mmHg    RVOT peak ria 0.56 m/s    Ao root annulus 3.22 cm    STJ 3.20 cm    Ascending aorta 3.27 cm    IVC diameter 1.93 cm    Mean e' 0.10 m/s    ZLVIDS -5.62     ZLVIDD -10.17     LA WIDTH 4.7 cm    RA Width 4.4 cm    EF 40 %    TV resting pulmonary artery pressure 39 mmHg    RV TB RVSP 11 mmHg    Est. RA pres 8 mmHg    Narrative      Left Ventricle: The left ventricle is normal in size. Normal wall   thickness. There is concentric remodeling. Mild global hyperkinesis   present. There is reduced systolic function. Ejection fraction by visual   approximation is 40%. There is normal diastolic function.    Right Ventricle: Moderate right ventricular enlargement. Wall thickness   is normal. Systolic function is moderately reduced.    Mitral Valve: There is mild regurgitation.    Tricuspid Valve: There is moderate regurgitation.    Pulmonary Artery: The estimated pulmonary artery systolic pressure is   39 mmHg.    IVC/SVC: Intermediate venous pressure at 8 mmHg.     , EKG: Reviewed, and X-Ray: CXR: X-Ray Chest 1 View (CXR): No results found for this visit on 12/09/23. and X-Ray Chest PA and Lateral (CXR): No results found for this visit on  12/09/23.

## 2023-12-13 NOTE — PROGRESS NOTES
[facility] - Med Surg  Cardiology  Progress Note    Patient Name: Roland Ng  MRN: [id number]  Admission Date: 12/9/2023  Hospital Length of Stay: 3 days  Code Status: Full Code   Attending Physician: Solange Blanchard DO   Primary Care Physician: Janis Green NP  Expected Discharge Date:   Principal Problem:Atrial flutter    Subjective:   HPI:  68 year old  male with PMHX of CHF and atrial fibrillation presented  to ED with worsening constant SOB x 1 month. Pt  reports sleeping with four pillows and waking up with SOB in the night.  Associated sxs include  intermittent sharp chest pain,  dizziness,  abdominal swelling and BLE  swelling. Pt states he is on a fluid  pill he takes BID since BLE  swelling and  venous stasis onset in 2016. Pt states abdominal swelling is new. Pt was seen in [facility]  ED on 12/523 for SOB and was instructed  to double Lasix dose. Noted HR  was in 130s in aflutter on 12/5, was seen at [facility].       Troponin was 0.031 yesterday. . Potassium  3.4. LFTs are elevated. LST 72/ WBC is 13.   EKG shows atrial flutter with variable AV block, Right bundle branch block, Septal infarct.   EF down to 40%, prior was 60% in March 2022    Hospital Course:   12/11/23-Patient seen and examined today, sitting up in bed. Feels ok. Remains SOB, needs continued IV diuresis. HR controlled. Labs reviewed.     12/12/23-Patient seen and examined today, sitting up in bed. Diuresing well. SOB improving but still significantly overloaded. HR variable. Labs reviewed. Creatinine 1.0, K 3.4. Needs ZOE/DCCV once compensated.    12/13/23-Patient seen and examined today, sitting up in bedside chair. Diuresing well. States he was able to ambulate down the hallway today. Still overloaded, continue diuretics. Labs reviewed/stable.      Review of Systems   Constitutional: Positive for malaise/fatigue.   HENT: Negative.     Eyes: Negative.    Cardiovascular:  Positive for dyspnea on exertion and leg swelling.    Respiratory:  Positive for shortness of breath.    Endocrine: Negative.    Hematologic/Lymphatic: Negative.    Skin: Negative.    Musculoskeletal: Negative.    Gastrointestinal: Negative.    Genitourinary:         Scrotal swelling     Neurological: Negative.    Psychiatric/Behavioral: Negative.     Allergic/Immunologic: Negative.      Objective:     Vital Signs (Most Recent):  Temp: 97.7 °F (36.5 °C) (12/13/23 1255)  Pulse: 79 (12/13/23 1255)  Resp: 18 (12/13/23 1255)  BP: (!) 93/59 (12/13/23 1255)  SpO2: (!) 94 % (12/13/23 1255) Vital Signs (24h Range):  Temp:  [97.7 °F (36.5 °C)-98.7 °F (37.1 °C)] 97.7 °F (36.5 °C)  Pulse:  [62-98] 79  Resp:  [16-24] 18  SpO2:  [92 %-98 %] 94 %  BP: ()/(57-67) 93/59     Weight: (!) 143 kg (315 lb 4.1 oz)  Body mass index is 43.97 kg/m².     SpO2: (!) 94 %         Intake/Output Summary (Last 24 hours) at 12/13/2023 1408  Last data filed at 12/13/2023 0828  Gross per 24 hour   Intake 693.07 ml   Output 4225 ml   Net -3531.93 ml       Lines/Drains/Airways       Peripheral Intravenous Line  Duration                  Peripheral IV - Single Lumen 12/11/23 0120 20 G Anterior;Left Forearm 2 days                       Physical Exam  Vitals and nursing note reviewed.   Constitutional:       General: He is not in acute distress.     Appearance: Normal appearance. He is well-developed. He is obese. He is not diaphoretic.   HENT:      Head: Normocephalic and atraumatic.   Eyes:      General:         Right eye: No discharge.         Left eye: No discharge.      Pupils: Pupils are equal, round, and reactive to light.   Cardiovascular:      Rate and Rhythm: Normal rate. Rhythm irregularly irregular.      Heart sounds: S1 normal and S2 normal. No murmur heard.  Pulmonary:      Effort: Pulmonary effort is normal. No respiratory distress.      Breath sounds: Rales present. No wheezing.   Abdominal:      General: There is distension.      Tenderness: There is no rebound.   Musculoskeletal:     "  Right lower leg: Edema present.      Left lower leg: Edema present.   Skin:     General: Skin is warm and dry.      Findings: No erythema.   Neurological:      Mental Status: He is alert and oriented to person, place, and time.   Psychiatric:         Mood and Affect: Mood normal.         Behavior: Behavior normal.         Thought Content: Thought content normal.            Significant Labs: CMP   Recent Labs   Lab 12/12/23  0541 12/12/23  2300 12/13/23  0548    142 141   K 3.4* 3.6 3.8    102 101   CO2 29 28 29   * 136* 122*   BUN 17 19 18   CREATININE 1.0 1.2 1.0   CALCIUM 8.7 9.4 9.3   ANIONGAP 10 12 11   , CBC   Recent Labs   Lab 12/12/23  1003   WBC 12.01   HGB 15.0   HCT 45.6      , Troponin No results for input(s): "TROPONINI" in the last 48 hours., and All pertinent lab results from the last 24 hours have been reviewed.    Significant Imaging: Echocardiogram: Transthoracic echo (TTE) complete (Cupid Only):   Results for orders placed or performed during the hospital encounter of 12/09/23   Echo   Result Value Ref Range    BSA 2.62 m2    LVOT stroke volume 44.74 cm3    LVIDd 5.31 3.5 - 6.0 cm    LV Systolic Volume 81.84 mL    LV Systolic Volume Index 32.5 mL/m2    LVIDs 4.27 (A) 2.1 - 4.0 cm    LV Diastolic Volume 135.86 mL    LV Diastolic Volume Index 53.91 mL/m2    IVS 1.28 (A) 0.6 - 1.1 cm    LVOT diameter 2.07 cm    LVOT area 3.4 cm2    FS 20 (A) 28 - 44 %    Left Ventricle Relative Wall Thickness 0.49 cm    Posterior Wall 1.29 (A) 0.6 - 1.1 cm    LV mass 283.13 g    LV Mass Index 112 g/m2    MV Peak E Ishmael 1.08 m/s    TDI LATERAL 0.11 m/s    TDI SEPTAL 0.08 m/s    E/E' ratio 11.37 m/s    MV Peak A Ishmael 0.23 m/s    TR Max Ishmael 2.78 m/s    E/A ratio 4.70     IVRT 31.40 msec    E wave deceleration time 167.06 msec    LV SEPTAL E/E' RATIO 13.50 m/s    LV LATERAL E/E' RATIO 9.82 m/s    LVOT peak ishmael 0.68 m/s    Left Ventricular Outflow Tract Mean Velocity 0.44 cm/s    Left Ventricular " Outflow Tract Mean Gradient 0.91 mmHg    RV mid diameter 4.02 cm    RVOT peak VTI 9.8 cm    TAPSE 1.70 cm    LA size 5.35 cm    Left Atrium Major Axis 6.56 cm    RA Major Axis 5.76 cm    AV mean gradient 3 mmHg    AV peak gradient 4 mmHg    Ao peak ria 1.04 m/s    Ao VTI 17.90 cm    LVOT peak VTI 13.30 cm    AV valve area 2.50 cm²    AV Velocity Ratio 0.65     AV index (prosthetic) 0.74     MARILYN by Velocity Ratio 2.20 cm²    Mr max ira 4.52 m/s    MV stenosis pressure 1/2 time 48.45 ms    MV valve area p 1/2 method 4.54 cm2    Triscuspid Valve Regurgitation Peak Gradient 31 mmHg    PV mean gradient 1 mmHg    RVOT peak ria 0.56 m/s    Ao root annulus 3.22 cm    STJ 3.20 cm    Ascending aorta 3.27 cm    IVC diameter 1.93 cm    Mean e' 0.10 m/s    ZLVIDS -5.62     ZLVIDD -10.17     LA WIDTH 4.7 cm    RA Width 4.4 cm    EF 40 %    TV resting pulmonary artery pressure 39 mmHg    RV TB RVSP 11 mmHg    Est. RA pres 8 mmHg    Narrative      Left Ventricle: The left ventricle is normal in size. Normal wall   thickness. There is concentric remodeling. Mild global hyperkinesis   present. There is reduced systolic function. Ejection fraction by visual   approximation is 40%. There is normal diastolic function.    Right Ventricle: Moderate right ventricular enlargement. Wall thickness   is normal. Systolic function is moderately reduced.    Mitral Valve: There is mild regurgitation.    Tricuspid Valve: There is moderate regurgitation.    Pulmonary Artery: The estimated pulmonary artery systolic pressure is   39 mmHg.    IVC/SVC: Intermediate venous pressure at 8 mmHg.     , EKG: Reviewed, and X-Ray: CXR: X-Ray Chest 1 View (CXR): No results found for this visit on 12/09/23. and X-Ray Chest PA and Lateral (CXR): No results found for this visit on 12/09/23.  Assessment and Plan:   Patient who presents with aflutter/decompensated combined CHF. Needs continued IV diuresis. ZEO/DCCV once compensated.    * Atrial flutter  Rate  controlled with toprol xl 50 bid  Once euvolemic, if still symptomatic with SOB, will consider ZOE/Cardioversion vs EP evaluation for flutter/fib ablation    12/11/23  -Continue Toprol XL  -Continue heparin gtt  -Keep NPO after MN for possible ZOE/DCCV tmw    12/12/23  -Still volume overloaded, continue IV diuresis  -Continue BB, heparin gtt  -ZOE/DCCV once compensated    12/13/23  -See above    Lower extremity edema  -Assess response to IV diuresis  -CVI also contributing    Paroxysmal A-fib  -Continue anticoagulation and toprol xl    Morbid obesity with body mass index (BMI) of 40.0 to 44.9 in adult  -weight loss    Hypertension  Titrate medications     Acute exacerbation of CHF (congestive heart failure)  New onset CHF with EF 40%  Needs ischemia evaluation once euvolemic  Monitor I/Os  Continue IV diuresis     12/11/23  -Still overloaded  -Continue IV diuresis  -Continue ACEi, BB  -Strict I's/O's  -Ischemic workup once volume status improved    12/12/23  -Needs continued IV diuresis  -Continue BB, ACEi  -ZOE/DCCV and ischemic workup once compensated    12/13/23  -See above, needs continued IV diuresis        VTE Risk Mitigation (From admission, onward)           Ordered     heparin 25,000 units in dextrose 5% 250 mL (100 units/mL) infusion LOW INTENSITY nomogram - [facility]  Continuous        Question:  Begin at (units/kg/hr)  Answer:  12    12/10/23 1752     heparin 25,000 units in dextrose 5% (100 units/ml) IV bolus from bag - ADDITIONAL PRN BOLUS - 60 units/kg  As needed (PRN)        Question:  Heparin Infusion Adjustment (DO NOT MODIFY ANSWER)  Answer:  [email or web address]\Images\Pharmacy\HeparinInfusions\heparin LOW INTENSITY nomogram for [facility] [email or web address]    12/10/23 1752     heparin 25,000 units in dextrose 5% (100 units/ml) IV bolus from bag - ADDITIONAL PRN BOLUS - 30 units/kg  As needed (PRN)        Question:  Heparin Infusion Adjustment (DO NOT MODIFY ANSWER)  Answer:   [email or web address]\epic\Images\Pharmacy\HeparinInfusions\heparin LOW INTENSITY nomogram for [facility] [id number]    12/10/23 1752     Reason for No Pharmacological VTE Prophylaxis  Once        Question:  Reasons:  Answer:  Already adequately anticoagulated on oral Anticoagulants    12/10/23 0102     IP VTE HIGH RISK PATIENT  Once         12/10/23 0102     Place sequential compression device  Until discontinued         12/10/23 0102                    Marisa Pizano PA-C  Cardiology  [facility] - Med S[facility]

## 2023-12-13 NOTE — PLAN OF CARE
Discussed poc with pt, pt verbalized understanding    VS wnl  Cardiac monitoring in use, pt is in Aflutter  Fall precautions in place, remains injury free  Pt denies c/o pain  Heparin infusing. Heparin protocol in place    Accurate I&Os. Continuing to diurese   Abx given as prescribed  Bed locked at lowest position  Call light within reach    Chart check complete  Will cont with POC

## 2023-12-13 NOTE — PT/OT/SLP PROGRESS
Physical Therapy  Treatment    Roland Ng   MRN: [id number]   Admitting Diagnosis: Atrial flutter    PT Received On: 12/13/23  PT Start Time: 0820     PT Stop Time: 0845    PT Total Time (min): 25 min       Billable Minutes:  Gait Training 15 and Therapeutic Activity 10    Treatment Type: Treatment  PT/PTA: PTA     Number of PTA visits since last PT visit: 1       General Precautions: Standard, fall  Orthopedic Precautions: N/A  Braces: N/A  Respiratory Status: Room air    Spiritual, Cultural Beliefs, Buddhism Practices, Values that Affect Care: no    Subjective:  Communicated with patient's nurse, Alfonso, and completed Epic chart review prior to session.  Patient agreed to PT session.     Pain/Comfort  Pain Rating 1: 0/10  Pain Rating Post-Intervention 1: 0/10    Objective:   Patient found with: peripheral IV, telemetry    Patient found sitting up EOB.     STS from EOB > RW: CGA    100ft x3 trials w/ RW CGA    Stand pivot T/F to chair w/ RW CGA    Reviewed AROM TE to BLE including: hip flex/ext, knee flex/ext, ankle PF/DF  To be completed a minimum of 10 reps for each LE in order to promote return of function, strength and ROM.     Educated patient on importance of increased tolerance to upright position and direct impact on CV endurance and strength. Patient encouraged to sit up in chair/ EOB, for a minimum of 2 consecutive hours, 3x per day. Encouraged patient to perform AROM TE to BLE throughout the day within all available planes of motion. Re enforced importance of utilizing call light to meet needs in room and not attempt to get up without staff assistance. Patient verbalized understanding and agreed to comply.     AM-PAC 6 CLICK MOBILITY  How much help from another person does this patient currently need?   1 = Unable, Total/Dependent Assistance  2 = A lot, Maximum/Moderate Assistance  3 = A little, Minimum/Contact Guard/Supervision  4 = None, Modified [place]/Independent    Turning over in bed  (including adjusting bedclothes, sheets and blankets)?: 1 (NT)  Sitting down on and standing up from a chair with arms (e.g., wheelchair, bedside commode, etc.): 3  Moving from lying on back to sitting on the side of the bed?: 1 (NT)  Moving to and from a bed to a chair (including a wheelchair)?: 3  Need to walk in hospital room?: 3  Climbing 3-5 steps with a railing?: 1 (NT)  Basic Mobility Total Score: 12    AM-PAC Raw Score CMS G-Code Modifier Level of Impairment Assistance   6 % Total / Unable   7 - 9 CM 80 - 100% Maximal Assist   10 - 14 CL 60 - 80% Moderate Assist   15 - 19 CK 40 - 60% Moderate Assist   20 - 22 CJ 20 - 40% Minimal Assist   23 CI 1-20% SBA / CGA   24 CH 0% Independent/ Mod I     Patient left up in chair with call button in reach.    Assessment:  Roland Ng is a 68 y.o. male with a medical diagnosis of Atrial flutter and presents with overall decline in functional mobility. Patient would continue to benefit from skilled PT to address functional limitations listed below in order to return to PLOF/decrease caregiver burden.    Rehab identified problem list/impairments: weakness, impaired endurance, impaired self care skills, impaired functional mobility, gait instability, impaired balance, decreased lower extremity function, decreased safety awareness, edema, impaired cardiopulmonary response to activity    Rehab potential is good.    Activity tolerance: Fair    Discharge recommendations: Moderate Intensity Therapy      Barriers to discharge:      Equipment recommendations: walker, rolling     GOALS:   Multidisciplinary Problems       Physical Therapy Goals          Problem: Physical Therapy    Goal Priority Disciplines Outcome Goal Variances Interventions   Physical Therapy Goal     PT, PT/OT Ongoing, Progressing     Description: Goals to be met by 12/25/23.  1. Pt will complete bed mobility MIN A.  2. Pt will complete sit to stand CGA.  3. Pt will ambulate 200ft CGA using  RW.  4. Pt will increase AMPAC score by 2 points to progress functional mobility.                       PLAN:    Patient to be seen 3 x/week to address the above listed problems via gait training, therapeutic activities, therapeutic exercises  Plan of Care expires: 12/25/23  Plan of Care reviewed with: patient         12/13/2023

## 2023-12-13 NOTE — PLAN OF CARE
Discussed poc with pt and family, verbalized understanding     Purposeful rounding every 2hours    VS wnl  Cardiac monitoring in use  Fall precautions in place, remains injury free  Pt denies c/o n/v and pain    Accurate I&Os  Bed locked at lowest position  Call light within reach    Chart check complete  Will cont with POC    Problem: Ad  Problem: Adult Inpatient Plan of Care  Goal: Absence of Hospital-Acquired Illness or Injury  Outcome: Ongoing, Not Progressing  Goal: Optimal Comfort and Wellbeing  Outcome: Ongoing, Not Progressing  Goal: Readiness for Transition of Care  Outcome: Ongoing, Not Progressing     Problem: Bariatric Environmental Safety  Goal: Safety Maintained with Care  Outcome: Ongoing, Not Progressing     Problem: Skin Injury Risk Increased  Goal: Skin Health and Integrity  Outcome: Ongoing, Not Progressing   ult Inpatient Plan of Care  Goal: Plan of Care Review  Outcome: Ongoing, Progressing  Goal: Patient-Specific Goal (Individualized)  Outcome: Ongoing, Progressing     Problem: Diabetes Comorbidity  Goal: Blood Glucose Level Within Targeted Range  Outcome: Ongoing, Progressing     Problem: Fall Injury Risk  Goal: Absence of Fall and Fall-Related Injury  Outcome: Ongoing, Progressing

## 2023-12-13 NOTE — ASSESSMENT & PLAN NOTE
New onset CHF with EF 40%  Needs ischemia evaluation once euvolemic  Monitor I/Os  Continue IV diuresis     12/11/23  -Still overloaded  -Continue IV diuresis  -Continue ACEi, BB  -Strict I's/O's  -Ischemic workup once volume status improved    12/12/23  -Needs continued IV diuresis  -Continue BB, ACEi  -ZOE/DCCV and ischemic workup once compensated    12/13/23  -See above, needs continued IV diuresis

## 2023-12-13 NOTE — ASSESSMENT & PLAN NOTE
Rate controlled with toprol xl 50 bid  Once euvolemic, if still symptomatic with SOB, will consider ZOE/Cardioversion vs EP evaluation for flutter/fib ablation    12/11/23  -Continue Toprol XL  -Continue heparin gtt  -Keep NPO after MN for possible ZOE/DCCV tmw    12/12/23  -Still volume overloaded, continue IV diuresis  -Continue BB, heparin gtt  -ZOE/DCCV once compensated    12/13/23  -See above

## 2023-12-14 LAB
ANION GAP SERPL CALC-SCNC: 12 MMOL/L (ref 8–16)
APTT PPP: 50.7 SEC (ref 21–32)
APTT PPP: 51.2 SEC (ref 21–32)
BASOPHILS # BLD AUTO: 0.03 K/UL (ref 0–0.2)
BASOPHILS NFR BLD: 0.3 % (ref 0–1.9)
BUN SERPL-MCNC: 23 MG/DL (ref 8–23)
CALCIUM SERPL-MCNC: 9.6 MG/DL (ref 8.7–10.5)
CHLORIDE SERPL-SCNC: 98 MMOL/L (ref 95–110)
CO2 SERPL-SCNC: 29 MMOL/L (ref 23–29)
CREAT SERPL-MCNC: 1.1 MG/DL (ref 0.5–1.4)
DIFFERENTIAL METHOD BLD: ABNORMAL
EOSINOPHIL # BLD AUTO: 0.3 K/UL (ref 0–0.5)
EOSINOPHIL NFR BLD: 2.5 % (ref 0–8)
ERYTHROCYTE [DISTWIDTH] IN BLOOD BY AUTOMATED COUNT: 16.2 % (ref 11.5–14.5)
EST. GFR  (NO RACE VARIABLE): >60 ML/MIN/1.73 M^2
GLUCOSE SERPL-MCNC: 109 MG/DL (ref 70–110)
HCT VFR BLD AUTO: 47.4 % (ref 40–54)
HGB BLD-MCNC: 15.8 G/DL (ref 14–18)
IMM GRANULOCYTES # BLD AUTO: 0.04 K/UL (ref 0–0.04)
IMM GRANULOCYTES NFR BLD AUTO: 0.4 % (ref 0–0.5)
LYMPHOCYTES # BLD AUTO: 1.9 K/UL (ref 1–4.8)
LYMPHOCYTES NFR BLD: 18.9 % (ref 18–48)
MAGNESIUM SERPL-MCNC: 2 MG/DL (ref 1.6–2.6)
MCH RBC QN AUTO: 26.2 PG (ref 27–31)
MCHC RBC AUTO-ENTMCNC: 33.3 G/DL (ref 32–36)
MCV RBC AUTO: 79 FL (ref 82–98)
MONOCYTES # BLD AUTO: 0.9 K/UL (ref 0.3–1)
MONOCYTES NFR BLD: 8.5 % (ref 4–15)
NEUTROPHILS # BLD AUTO: 6.9 K/UL (ref 1.8–7.7)
NEUTROPHILS NFR BLD: 69.4 % (ref 38–73)
NRBC BLD-RTO: 0 /100 WBC
PLATELET # BLD AUTO: 196 K/UL (ref 150–450)
PMV BLD AUTO: 11.7 FL (ref 9.2–12.9)
POTASSIUM SERPL-SCNC: 3.6 MMOL/L (ref 3.5–5.1)
RBC # BLD AUTO: 6.04 M/UL (ref 4.6–6.2)
SODIUM SERPL-SCNC: 139 MMOL/L (ref 136–145)
WBC # BLD AUTO: 9.99 K/UL (ref 3.9–12.7)

## 2023-12-14 PROCEDURE — 97116 GAIT TRAINING THERAPY: CPT | Mod: CQ

## 2023-12-14 PROCEDURE — 93005 ELECTROCARDIOGRAM TRACING: CPT

## 2023-12-14 PROCEDURE — 83735 ASSAY OF MAGNESIUM: CPT | Performed by: HOSPITALIST

## 2023-12-14 PROCEDURE — 36415 COLL VENOUS BLD VENIPUNCTURE: CPT | Performed by: INTERNAL MEDICINE

## 2023-12-14 PROCEDURE — 25000003 PHARM REV CODE 250: Performed by: INTERNAL MEDICINE

## 2023-12-14 PROCEDURE — 21400001 HC TELEMETRY ROOM

## 2023-12-14 PROCEDURE — 93010 ELECTROCARDIOGRAM REPORT: CPT | Mod: 76,,, | Performed by: INTERNAL MEDICINE

## 2023-12-14 PROCEDURE — 85730 THROMBOPLASTIN TIME PARTIAL: CPT | Mod: 91 | Performed by: INTERNAL MEDICINE

## 2023-12-14 PROCEDURE — 99232 SBSQ HOSP IP/OBS MODERATE 35: CPT | Mod: 25,,, | Performed by: PHYSICIAN ASSISTANT

## 2023-12-14 PROCEDURE — 85025 COMPLETE CBC W/AUTO DIFF WBC: CPT | Performed by: PHYSICIAN ASSISTANT

## 2023-12-14 PROCEDURE — 25000003 PHARM REV CODE 250: Performed by: HOSPITALIST

## 2023-12-14 PROCEDURE — 36415 COLL VENOUS BLD VENIPUNCTURE: CPT | Performed by: PHYSICIAN ASSISTANT

## 2023-12-14 PROCEDURE — 11000001 HC ACUTE MED/SURG PRIVATE ROOM

## 2023-12-14 PROCEDURE — 36415 COLL VENOUS BLD VENIPUNCTURE: CPT | Performed by: HOSPITALIST

## 2023-12-14 PROCEDURE — 97530 THERAPEUTIC ACTIVITIES: CPT | Mod: CQ

## 2023-12-14 PROCEDURE — 80048 BASIC METABOLIC PNL TOTAL CA: CPT | Performed by: HOSPITALIST

## 2023-12-14 PROCEDURE — 93010 ELECTROCARDIOGRAM REPORT: CPT | Mod: ,,, | Performed by: INTERNAL MEDICINE

## 2023-12-14 PROCEDURE — 63600175 PHARM REV CODE 636 W HCPCS: Performed by: STUDENT IN AN ORGANIZED HEALTH CARE EDUCATION/TRAINING PROGRAM

## 2023-12-14 PROCEDURE — 25000003 PHARM REV CODE 250: Performed by: STUDENT IN AN ORGANIZED HEALTH CARE EDUCATION/TRAINING PROGRAM

## 2023-12-14 RX ADMIN — METOPROLOL SUCCINATE 50 MG: 50 TABLET, EXTENDED RELEASE ORAL at 08:12

## 2023-12-14 RX ADMIN — METOPROLOL SUCCINATE 50 MG: 50 TABLET, EXTENDED RELEASE ORAL at 09:12

## 2023-12-14 RX ADMIN — FUROSEMIDE 60 MG: 10 INJECTION, SOLUTION INTRAMUSCULAR; INTRAVENOUS at 09:12

## 2023-12-14 RX ADMIN — HEPARIN SODIUM 19 UNITS/KG/HR: 10000 INJECTION, SOLUTION INTRAVENOUS at 05:12

## 2023-12-14 RX ADMIN — MAGNESIUM OXIDE TAB 400 MG (241.3 MG ELEMENTAL MG) 400 MG: 400 (241.3 MG) TAB at 09:12

## 2023-12-14 RX ADMIN — LISINOPRIL 20 MG: 20 TABLET ORAL at 09:12

## 2023-12-14 RX ADMIN — FUROSEMIDE 60 MG: 10 INJECTION, SOLUTION INTRAMUSCULAR; INTRAVENOUS at 10:12

## 2023-12-14 RX ADMIN — CETIRIZINE HYDROCHLORIDE 10 MG: 10 TABLET, FILM COATED ORAL at 09:12

## 2023-12-14 RX ADMIN — METHIMAZOLE 5 MG: 5 TABLET ORAL at 09:12

## 2023-12-14 RX ADMIN — HEPARIN SODIUM 19 UNITS/KG/HR: 10000 INJECTION, SOLUTION INTRAVENOUS at 03:12

## 2023-12-14 RX ADMIN — ASPIRIN 81 MG CHEWABLE TABLET 81 MG: 81 TABLET CHEWABLE at 09:12

## 2023-12-14 RX ADMIN — METOLAZONE 5 MG: 5 TABLET ORAL at 09:12

## 2023-12-14 NOTE — ASSESSMENT & PLAN NOTE
Remains in atrial flutter  Continue heparin infusion  Planned for ZOE/cardioversion once more euvolemic as per Cardiology

## 2023-12-14 NOTE — PT/OT/SLP PROGRESS
Physical Therapy      Patient Name:  Roland Ng   MRN:  [id number]    07:40 a.m.  Per nurse request, PT session held at this time due to patient with low HR. Will follow up at next available opportunity.

## 2023-12-14 NOTE — SUBJECTIVE & OBJECTIVE
Interval History: No acute events overnight.  Seen and examined without any family present.  Patient sitting up in chair, reports that shortness of breath with activities continuing to improve.  No new complaints.  Per chart review diuresed greater than 4 L yesterday.  Remains in atrial flutter, Cardiology plans ZOE/cardioversion once more euvolemic      Review of Systems  Objective:     Vital Signs (Most Recent):  Temp: 98.9 °F (37.2 °C) (12/13/23 1613)  Pulse: 85 (12/13/23 1613)  Resp: 18 (12/13/23 1613)  BP: 116/63 (12/13/23 1613)  SpO2: 100 % (12/13/23 1613) Vital Signs (24h Range):  Temp:  [97.7 °F (36.5 °C)-98.9 °F (37.2 °C)] 98.9 °F (37.2 °C)  Pulse:  [62-98] 85  Resp:  [18-24] 18  SpO2:  [92 %-100 %] 100 %  BP: ()/(57-67) 116/63     Weight: (!) 143 kg (315 lb 4.1 oz)  Body mass index is 43.97 kg/m².    Intake/Output Summary (Last 24 hours) at 12/13/2023 1827  Last data filed at 12/13/2023 1816  Gross per 24 hour   Intake 1160 ml   Output 3975 ml   Net -2815 ml         Physical Exam  Vitals and nursing note reviewed. Exam conducted with a chaperone present.   Constitutional:       General: He is not in acute distress.     Appearance: He is obese. He is not ill-appearing or diaphoretic.   HENT:      Head: Normocephalic and atraumatic.      Right Ear: External ear normal.      Left Ear: External ear normal.      Nose: Nose normal.      Mouth/Throat:      Mouth: Mucous membranes are moist.   Eyes:      Extraocular Movements: Extraocular movements intact.      Pupils: Pupils are equal, round, and reactive to light.   Cardiovascular:      Rate and Rhythm: Normal rate and regular rhythm.   Pulmonary:      Effort: Pulmonary effort is normal. No respiratory distress.      Breath sounds: Rales present. No wheezing.      Comments: On room air  Abdominal:      General: Bowel sounds are normal.      Palpations: Abdomen is soft.      Tenderness: There is no abdominal tenderness. There is no guarding or rebound.    Musculoskeletal:      Cervical back: Neck supple.      Right lower leg: Pitting Edema present.      Left lower leg: Pitting Edema present.      Comments: Lymphedema noted on bilateral lower extremities   Skin:     General: Skin is warm and dry.   Neurological:      General: No focal deficit present.      Mental Status: He is alert and oriented to person, place, and time.   Psychiatric:         Mood and Affect: Mood normal.             Significant Labs: All pertinent labs within the past 24 hours have been reviewed.  CMP:   Recent Labs   Lab 12/12/23  0541 12/12/23  2300 12/13/23  0548    142 141   K 3.4* 3.6 3.8    102 101   CO2 29 28 29   * 136* 122*   BUN 17 19 18   CREATININE 1.0 1.2 1.0   CALCIUM 8.7 9.4 9.3   ANIONGAP 10 12 11     Magnesium:   Recent Labs   Lab 12/12/23  2300   MG 2.3       Significant Imaging: I have reviewed all pertinent imaging results/findings within the past 24 hours.

## 2023-12-14 NOTE — PLAN OF CARE
Patient is in stable condition, no acute distress, remained free fr om injuries, receiving IV heparin, no pain reported, on cardiac monitoring (Aflutter), VSS, and all active orders reviewed. 24 hr chart check performed.

## 2023-12-14 NOTE — PROGRESS NOTES
[facility] - Med Surg  Cardiology  Progress Note    Patient Name: Roland Ng  MRN: [id number]  Admission Date: 12/9/2023  Hospital Length of Stay: 4 days  Code Status: Full Code   Attending Physician: Solange Blanchard DO   Primary Care Physician: Janis Green NP  Expected Discharge Date:   Principal Problem:Atrial flutter    Subjective:   HPI:  68 year old  male with PMHX of CHF and atrial fibrillation presented  to ED with worsening constant SOB x 1 month. Pt  reports sleeping with four pillows and waking up with SOB in the night.  Associated sxs include  intermittent sharp chest pain,  dizziness,  abdominal swelling and BLE  swelling. Pt states he is on a fluid  pill he takes BID since BLE  swelling and  venous stasis onset in 2016. Pt states abdominal swelling is new. Pt was seen in [facility]  ED on 12/523 for SOB and was instructed  to double Lasix dose. Noted HR  was in 130s in aflutter on 12/5, was seen at [facility].       Troponin was 0.031 yesterday. . Potassium  3.4. LFTs are elevated. LST 72/ WBC is 13.   EKG shows atrial flutter with variable AV block, Right bundle branch block, Septal infarct.   EF down to 40%, prior was 60% in March 2022    Hospital Course:   12/11/23-Patient seen and examined today, sitting up in bed. Feels ok. Remains SOB, needs continued IV diuresis. HR controlled. Labs reviewed.     12/12/23-Patient seen and examined today, sitting up in bed. Diuresing well. SOB improving but still significantly overloaded. HR variable. Labs reviewed. Creatinine 1.0, K 3.4. Needs ZOE/DCCV once compensated.    12/13/23-Patient seen and examined today, sitting up in bedside chair. Diuresing well. States he was able to ambulate down the hallway today. Still overloaded, continue diuretics. Labs reviewed/stable.    12/14/23-Patient seen and examined today, resting in bed. Feels ok. Diuresing well. SOB/edema improving. Creatinine stable. Plans for ZOE/DCCV once compensated.       Review of Systems    Constitutional: Positive for malaise/fatigue.   HENT: Negative.     Eyes: Negative.    Cardiovascular:  Positive for dyspnea on exertion and leg swelling.   Respiratory:  Positive for shortness of breath.    Endocrine: Negative.    Hematologic/Lymphatic: Negative.    Skin: Negative.    Musculoskeletal: Negative.    Gastrointestinal:  Positive for bloating.   Genitourinary: Negative.    Neurological: Negative.    Psychiatric/Behavioral: Negative.     Allergic/Immunologic: Negative.      Objective:     Vital Signs (Most Recent):  Temp: 98.9 °F (37.2 °C) (12/14/23 1406)  Pulse: 63 (12/14/23 1406)  Resp: 18 (12/14/23 1406)  BP: (!) 97/55 (12/14/23 1406)  SpO2: 99 % (12/14/23 1406) Vital Signs (24h Range):  Temp:  [97.7 °F (36.5 °C)-98.9 °F (37.2 °C)] 98.9 °F (37.2 °C)  Pulse:  [42-99] 63  Resp:  [18] 18  SpO2:  [95 %-100 %] 99 %  BP: ()/(55-63) 97/55     Weight: (!) 143 kg (315 lb 4.1 oz)  Body mass index is 43.97 kg/m².     SpO2: 99 %         Intake/Output Summary (Last 24 hours) at 12/14/2023 1450  Last data filed at 12/14/2023 0400  Gross per 24 hour   Intake 200 ml   Output 2925 ml   Net -2725 ml       Lines/Drains/Airways       Peripheral Intravenous Line  Duration                  Peripheral IV - Single Lumen 12/11/23 0120 20 G Anterior;Left Forearm 3 days                       Physical Exam  Vitals and nursing note reviewed.   Constitutional:       General: He is not in acute distress.     Appearance: Normal appearance. He is well-developed. He is obese. He is not diaphoretic.   HENT:      Head: Normocephalic and atraumatic.   Eyes:      General:         Right eye: No discharge.         Left eye: No discharge.      Pupils: Pupils are equal, round, and reactive to light.   Cardiovascular:      Rate and Rhythm: Normal rate. Rhythm irregularly irregular.      Heart sounds: Normal heart sounds, S1 normal and S2 normal. No murmur heard.     No S4 sounds.   Pulmonary:      Effort: Pulmonary effort is normal. No  "respiratory distress.      Breath sounds: Rales present. No wheezing.   Abdominal:      General: There is no distension.   Musculoskeletal:      Right lower leg: Edema present.      Left lower leg: Edema present.   Skin:     General: Skin is warm and dry.      Findings: No erythema.   Neurological:      Mental Status: He is alert and oriented to person, place, and time.   Psychiatric:         Mood and Affect: Mood normal.         Behavior: Behavior normal.         Thought Content: Thought content normal.            Significant Labs: CMP   Recent Labs   Lab 12/12/23  2300 12/13/23  0548 12/14/23  0602    141 139   K 3.6 3.8 3.6    101 98   CO2 28 29 29   * 122* 109   BUN 19 18 23   CREATININE 1.2 1.0 1.1   CALCIUM 9.4 9.3 9.6   ANIONGAP 12 11 12   , CBC No results for input(s): "WBC", "HGB", "HCT", "PLT" in the last 48 hours., Troponin No results for input(s): "TROPONINI" in the last 48 hours., and All pertinent lab results from the last 24 hours have been reviewed.    Significant Imaging: Echocardiogram: Transthoracic echo (TTE) complete ([facility] Only):   Results for orders placed or performed during the hospital encounter of 12/09/23   Echo   Result Value Ref Range    BSA 2.62 m2    LVOT stroke volume 44.74 cm3    LVIDd 5.31 3.5 - 6.0 cm    LV Systolic Volume 81.84 mL    LV Systolic Volume Index 32.5 mL/m2    LVIDs 4.27 (A) 2.1 - 4.0 cm    LV Diastolic Volume 135.86 mL    LV Diastolic Volume Index 53.91 mL/m2    IVS 1.28 (A) 0.6 - 1.1 cm    LVOT diameter 2.07 cm    LVOT area 3.4 cm2    FS 20 (A) 28 - 44 %    Left Ventricle Relative Wall Thickness 0.49 cm    Posterior Wall 1.29 (A) 0.6 - 1.1 cm    LV mass 283.13 g    LV Mass Index 112 g/m2    MV Peak E Ishmael 1.08 m/s    TDI LATERAL 0.11 m/s    TDI SEPTAL 0.08 m/s    E/E' ratio 11.37 m/s    MV Peak A Ishmael 0.23 m/s    TR Max Ishmael 2.78 m/s    E/A ratio 4.70     IVRT 31.40 msec    E wave deceleration time 167.06 msec    LV SEPTAL E/E' RATIO 13.50 m/s    LV " LATERAL E/E' RATIO 9.82 m/s    LVOT peak ria 0.68 m/s    Left Ventricular Outflow Tract Mean Velocity 0.44 cm/s    Left Ventricular Outflow Tract Mean Gradient 0.91 mmHg    RV mid diameter 4.02 cm    RVOT peak VTI 9.8 cm    TAPSE 1.70 cm    LA size 5.35 cm    Left Atrium Major Axis 6.56 cm    RA Major Axis 5.76 cm    AV mean gradient 3 mmHg    AV peak gradient 4 mmHg    Ao peak ria 1.04 m/s    Ao VTI 17.90 cm    LVOT peak VTI 13.30 cm    AV valve area 2.50 cm²    AV Velocity Ratio 0.65     AV index (prosthetic) 0.74     MARILYN by Velocity Ratio 2.20 cm²    Mr max ria 4.52 m/s    MV stenosis pressure 1/2 time 48.45 ms    MV valve area p 1/2 method 4.54 cm2    Triscuspid Valve Regurgitation Peak Gradient 31 mmHg    PV mean gradient 1 mmHg    RVOT peak ria 0.56 m/s    Ao root annulus 3.22 cm    STJ 3.20 cm    Ascending aorta 3.27 cm    IVC diameter 1.93 cm    Mean e' 0.10 m/s    ZLVIDS -5.62     ZLVIDD -10.17     LA WIDTH 4.7 cm    RA Width 4.4 cm    EF 40 %    TV resting pulmonary artery pressure 39 mmHg    RV TB RVSP 11 mmHg    Est. RA pres 8 mmHg    Narrative      Left Ventricle: The left ventricle is normal in size. Normal wall   thickness. There is concentric remodeling. Mild global hyperkinesis   present. There is reduced systolic function. Ejection fraction by visual   approximation is 40%. There is normal diastolic function.    Right Ventricle: Moderate right ventricular enlargement. Wall thickness   is normal. Systolic function is moderately reduced.    Mitral Valve: There is mild regurgitation.    Tricuspid Valve: There is moderate regurgitation.    Pulmonary Artery: The estimated pulmonary artery systolic pressure is   39 mmHg.    IVC/SVC: Intermediate venous pressure at 8 mmHg.     , EKG: Reviewed, and X-Ray: CXR: X-Ray Chest 1 View (CXR): No results found for this visit on 12/09/23. and X-Ray Chest PA and Lateral (CXR): No results found for this visit on 12/09/23.  Assessment and Plan:   Patient who presents  with atrial flutter and decompensated CHF. Continue IV diuresis and OMT. ZOE/DCCV once compensated.     * Atrial flutter  Rate controlled with toprol xl 50 bid  Once euvolemic, if still symptomatic with SOB, will consider ZOE/Cardioversion vs EP evaluation for flutter/fib ablation    12/11/23  -Continue Toprol XL  -Continue heparin gtt  -Keep NPO after MN for possible ZOE/DCCV tmw    12/12/23  -Still volume overloaded, continue IV diuresis  -Continue BB, heparin gtt  -ZOE/DCCV once compensated    12/13/23  -See above    Lower extremity edema  -Assess response to IV diuresis  -CVI also contributing    Paroxysmal A-fib  -Continue anticoagulation and toprol xl    Morbid obesity with body mass index (BMI) of 40.0 to 44.9 in adult  -weight loss    Hypertension  Titrate medications     Acute exacerbation of CHF (congestive heart failure)  New onset CHF with EF 40%  Needs ischemia evaluation once euvolemic  Monitor I/Os  Continue IV diuresis     12/11/23  -Still overloaded  -Continue IV diuresis  -Continue ACEi, BB  -Strict I's/O's  -Ischemic workup once volume status improved    12/12/23  -Needs continued IV diuresis  -Continue BB, ACEi  -ZOE/DCCV and ischemic workup once compensated    12/13/23  -See above, needs continued IV diuresis    12/14/23  -Continue IV diuresis         VTE Risk Mitigation (From admission, onward)           Ordered     heparin 25,000 units in dextrose 5% 250 mL (100 units/mL) infusion LOW INTENSITY nomogram - [facility]  Continuous        Question:  Begin at (units/kg/hr)  Answer:  12    12/10/23 1752     heparin 25,000 units in dextrose 5% (100 units/ml) IV bolus from bag - ADDITIONAL PRN BOLUS - 60 units/kg  As needed (PRN)        Question:  Heparin Infusion Adjustment (DO NOT MODIFY ANSWER)  Answer:  [email or web address]\Images\Pharmacy\HeparinInfusions\heparin LOW INTENSITY nomogram for [facility] [id number]    12/10/23 1752     heparin 25,000 units in dextrose 5% (100 units/ml) IV bolus from bag - ADDITIONAL  PRN BOLUS - 30 units/kg  As needed (PRN)        Question:  Heparin Infusion Adjustment (DO NOT MODIFY ANSWER)  Answer:  [email or web address]\Images\Pharmacy\HeparinInfusions\heparin LOW INTENSITY nomogram for [facility] [id number]    12/10/23 1752     Reason for No Pharmacological VTE Prophylaxis  Once        Question:  Reasons:  Answer:  Already adequately anticoagulated on oral Anticoagulants    12/10/23 0102     IP VTE HIGH RISK PATIENT  Once         12/10/23 0102     Place sequential compression device  Until discontinued         12/10/23 0102                    Marisa Pizano PA-C  Cardiology  [facility] - Med S[facility]

## 2023-12-14 NOTE — PLAN OF CARE
12/14/23 1030   Post-Acute Status   Post-Acute Authorization Placement   Post-Acute Placement Status Pending medical clearance/testing   Coverage healthy blue   Discharge Plan   Discharge Plan A Skilled Nursing Facility     Auth was recived for pt to dc to snf. Per the MD, pt isn't medically stable to dc today. Pending medical clearance for dc.

## 2023-12-14 NOTE — PROGRESS NOTES
[facility] Medicine  Progress Note    Patient Name: Roland Ng  MRN: [id number]  Patient Class: IP- Inpatient   Admission Date: 12/9/2023  Length of Stay: 3 days  Attending Physician: Solange Blanchard DO  Primary Care Provider: Janis Green NP        Subjective:     Principal Problem:Atrial flutter        HPI:  Roland Ng is a 68 y.o. male with a PMH  has a past medical history of CAD (coronary artery disease), Chronic congestive heart failure (01/13/2021), Diabetes mellitus with no complication, Hypertension associated with diabetes, Hyperthyroidism (01/13/2021), Morbid obesity with body mass index (BMI) of 40.0 to 44.9 in adult, and Paroxysmal A-fib. who presented to the ED for further evaluation of progressively worsening dyspnea/shortness a breath was well as bilateral lower extremity, scrotal, and penile swelling/edema.  Patient reports significant history of heart failure and was previously followed by Dr. Decker but stated he is not seen him in quite some time.  Patient also reported being off his home medications for approximately 3-4 months prior to getting back on them due to insurance related issues and has been compliant over the past 3 months.  Patient also reports non adherence to fluid/dietary restrictions and states he drinks approximately 20 bottles of water daily.  He reported no known alleviating or aggravating factors noted and reported associated symptoms included dyspnea both at rest and on exertion, orthopnea, PND, and wheezing in addition to reported swelling as noted above.  Patient reports negative history of asthma/COPD, does not use home oxygen, and does not use home CPAP.  All other review of systems negative except as noted above.  Initial workup in the ED concerning for signs/symptoms of acute CHF exacerbation with patient being admitted to Hospital Medicine inpatient for continued medical management.      PCP: Janis Green  N.      Overview/Hospital Course:  Cardiology consulted.  Patient currently being treated for CHF exacerbation with IV diuretics.  Given persistent atrial flutter, planned for ZOE/cardioversion once euvolemic.    PT/OT recommends moderate intensity therapy on discharge. SW consulted for SNF placement    Interval History: No acute events overnight.  Seen and examined without any family present.  Patient sitting up in chair, reports that shortness of breath with activities continuing to improve.  No new complaints.  Per chart review diuresed greater than 4 L yesterday.  Remains in atrial flutter, Cardiology plans ZOE/cardioversion once more euvolemic      Review of Systems  Objective:     Vital Signs (Most Recent):  Temp: 98.9 °F (37.2 °C) (12/13/23 1613)  Pulse: 85 (12/13/23 1613)  Resp: 18 (12/13/23 1613)  BP: 116/63 (12/13/23 1613)  SpO2: 100 % (12/13/23 1613) Vital Signs (24h Range):  Temp:  [97.7 °F (36.5 °C)-98.9 °F (37.2 °C)] 98.9 °F (37.2 °C)  Pulse:  [62-98] 85  Resp:  [18-24] 18  SpO2:  [92 %-100 %] 100 %  BP: ()/(57-67) 116/63     Weight: (!) 143 kg (315 lb 4.1 oz)  Body mass index is 43.97 kg/m².    Intake/Output Summary (Last 24 hours) at 12/13/2023 1827  Last data filed at 12/13/2023 1816  Gross per 24 hour   Intake 1160 ml   Output 3975 ml   Net -2815 ml         Physical Exam  Vitals and nursing note reviewed. Exam conducted with a chaperone present.   Constitutional:       General: He is not in acute distress.     Appearance: He is obese. He is not ill-appearing or diaphoretic.   HENT:      Head: Normocephalic and atraumatic.      Right Ear: External ear normal.      Left Ear: External ear normal.      Nose: Nose normal.      Mouth/Throat:      Mouth: Mucous membranes are moist.   Eyes:      Extraocular Movements: Extraocular movements intact.      Pupils: Pupils are equal, round, and reactive to light.   Cardiovascular:      Rate and Rhythm: Normal rate and regular rhythm.   Pulmonary:       Effort: Pulmonary effort is normal. No respiratory distress.      Breath sounds: Rales present. No wheezing.      Comments: On room air  Abdominal:      General: Bowel sounds are normal.      Palpations: Abdomen is soft.      Tenderness: There is no abdominal tenderness. There is no guarding or rebound.   Musculoskeletal:      Cervical back: Neck supple.      Right lower leg: Pitting Edema present.      Left lower leg: Pitting Edema present.      Comments: Lymphedema noted on bilateral lower extremities   Skin:     General: Skin is warm and dry.   Neurological:      General: No focal deficit present.      Mental Status: He is alert and oriented to person, place, and time.   Psychiatric:         Mood and Affect: Mood normal.             Significant Labs: All pertinent labs within the past 24 hours have been reviewed.  CMP:   Recent Labs   Lab 12/12/23  0541 12/12/23  2300 12/13/23  0548    142 141   K 3.4* 3.6 3.8    102 101   CO2 29 28 29   * 136* 122*   BUN 17 19 18   CREATININE 1.0 1.2 1.0   CALCIUM 8.7 9.4 9.3   ANIONGAP 10 12 11     Magnesium:   Recent Labs   Lab 12/12/23  2300   MG 2.3       Significant Imaging: I have reviewed all pertinent imaging results/findings within the past 24 hours.    Assessment/Plan:      * Atrial flutter  Remains in atrial flutter  Continue heparin infusion  Planned for ZOE/cardioversion once more euvolemic as per Cardiology      Scrotal swelling  Ultrasound showed large bilateral hydrocele with extensive scrotal wall thickening or edema, no abscess   Likely secondary to excessive volume in setting of CHF exacerbation      Lower extremity edema  Multifactorial secondary to lymphedema and CHF exacerbation   Wound care consulted, recommended outpatient referral to lymphedema clinic  Continue diuresis      SOB (shortness of breath)  In setting of CHF exacerbation  Improving although continues to still have dyspnea with exertion       Paroxysmal A-fib  Patient with  Paroxysmal (<7 days) atrial fibrillation which is uncontrolled currently with Beta Blocker and Calcium Channel Blocker. Patient is currently in atrial fibrillation.[id number] Score: 3. Anticoagulation indicated. Anticoagulation done with Eliquis .      Atherosclerosis of native coronary artery of native heart without angina pectoris  Patient with known CAD s/p  LHC without stent placement , which is controlled Will continue  Eliquis, ASA, and Statin and monitor for S/Sx of angina/ACS. Continue to monitor on telemetry.       Morbid obesity with body mass index (BMI) of 40.0 to 44.9 in adult  Body mass index is 43.97 kg/m². Morbid obesity complicates all aspects of disease management from diagnostic modalities to treatment. Weight loss encouraged and health benefits explained to patient.       Hypertension  Chronic, controlled. Latest blood pressure and vitals reviewed-     Temp:  [97.7 °F (36.5 °C)-98.9 °F (37.2 °C)]   Pulse:  [62-98]   Resp:  [18-24]   BP: ()/(57-67)   SpO2:  [92 %-100 %] .   Home meds for hypertension were reviewed and noted below.   Hypertension Medications               amLODIPine (NORVASC) 10 MG tablet Take 1 tablet (10 mg total) by mouth once daily.    furosemide (LASIX) 40 MG tablet Take 1 tablet (40 mg total) by mouth once daily.    lisinopriL (PRINIVIL,ZESTRIL) 20 MG tablet Take 1 tablet (20 mg total) by mouth once daily.    metoprolol succinate (TOPROL-XL) 50 MG 24 hr tablet Take 1 tablet (50 mg total) by mouth once daily.          While in the hospital, will manage blood pressure as follows; Continue home antihypertensive regimen except for p.o. Lasix given he is currently on IV Lasix    Will utilize p.r.n. blood pressure medication only if patient's blood pressure greater than 160/100 and he develops symptoms such as worsening chest pain or shortness of breath.      Acute exacerbation of CHF (congestive heart failure)  Patient is identified as having Diastolic (HFpEF) heart failure  that is Acute on chronic. CHF is currently uncontrolled due to Continued edema of extremities and Dyspnea not returned to baseline after single doses of IV diuretic. Latest ECHO performed and demonstrates- Results for orders placed during the hospital encounter of 03/08/22    Echo    Interpretation Summary  · The left ventricle is normal in size with concentric hypertrophy and normal systolic function.  · Mild left atrial enlargement.  · Indeterminate left ventricular diastolic function.  · The estimated PA systolic pressure is 18 mmHg.  · Normal right ventricular size with normal right ventricular systolic function.  · Intermediate central venous pressure (8 mmHg).  · The estimated ejection fraction is 65%.  · Mild pulmonic regurgitation.  · Mild mitral regurgitation.  Continue Beta Blocker, ACE/ARB, and Furosemide and monitor clinical status closely. Monitor on telemetry. Patient is on CHF pathway.  Monitor strict Is&Os and daily weights.  Place on fluid restriction of 1.5 L. Cardiology has been consulted. Continue to stress to patient importance of self efficacy and  on diet for CHF. Last BNP reviewed- and noted below   Recent Labs   Lab 12/09/23  2302   *       -telemetry  -continue IV diuretics  -bipap qhs prn  -titrate oxygen therapy as needed         Hyperthyroidism  Patient has chronic hyperthyroidism. TFTs reviewed-   Lab Results   Component Value Date    TSH 0.083 (L) 12/10/2023     Although TSH is slightly low, free T4 is within normal limits  Will continue home methimazole  Outpatient follow-up for further monitoring and medication titration as needed      VTE Risk Mitigation (From admission, onward)           Ordered     heparin 25,000 units in dextrose 5% 250 mL (100 units/mL) infusion LOW INTENSITY nomogram - [facility]  Continuous        Question:  Begin at (units/kg/hr)  Answer:  12    12/10/23 0754     heparin 25,000 units in dextrose 5% (100 units/ml) IV bolus from bag - ADDITIONAL PRN  BOLUS - 60 units/kg  As needed (PRN)        Question:  Heparin Infusion Adjustment (DO NOT MODIFY ANSWER)  Answer:  \\[email or web address]\Images\Pharmacy\HeparinInfusions\heparin LOW INTENSITY nomogram for [facility] [id number]    12/10/23 1752     heparin 25,000 units in dextrose 5% (100 units/ml) IV bolus from bag - ADDITIONAL PRN BOLUS - 30 units/kg  As needed (PRN)        Question:  Heparin Infusion Adjustment (DO NOT MODIFY ANSWER)  Answer:  \[email or web address]\epic\Images\Pharmacy\HeparinInfusions\heparin LOW INTENSITY nomogram for [facility] [id number]    12/10/23 1752     Reason for No Pharmacological VTE Prophylaxis  Once        Question:  Reasons:  Answer:  Already adequately anticoagulated on oral Anticoagulants    12/10/23 0102     IP VTE HIGH RISK PATIENT  Once         12/10/23 0102     Place sequential compression device  Until discontinued         12/10/23 0102                    Discharge Planning   RABIA:      Code Status: Full Code   Is the patient medically ready for discharge?:     Reason for patient still in hospital (select all that apply): Patient trending condition, Treatment, and Consult recommendations  Discharge Plan A: Skilled Nursing Facility                  Solange Blanchard DO  Department of Hospital Medicine   [facility] - Med Surg

## 2023-12-14 NOTE — ASSESSMENT & PLAN NOTE
New onset CHF with EF 40%  Needs ischemia evaluation once euvolemic  Monitor I/Os  Continue IV diuresis     12/11/23  -Still overloaded  -Continue IV diuresis  -Continue ACEi, BB  -Strict I's/O's  -Ischemic workup once volume status improved    12/12/23  -Needs continued IV diuresis  -Continue BB, ACEi  -ZOE/DCCV and ischemic workup once compensated    12/13/23  -See above, needs continued IV diuresis    12/14/23  -Continue IV diuresis

## 2023-12-14 NOTE — ASSESSMENT & PLAN NOTE
Body mass index is 43.97 kg/m². Morbid obesity complicates all aspects of disease management from diagnostic modalities to treatment. Weight loss encouraged and health benefits explained to patient.

## 2023-12-14 NOTE — ASSESSMENT & PLAN NOTE
Chronic, controlled. Latest blood pressure and vitals reviewed-     Temp:  [97.7 °F (36.5 °C)-98.9 °F (37.2 °C)]   Pulse:  [62-98]   Resp:  [18-24]   BP: ()/(57-67)   SpO2:  [92 %-100 %] .   Home meds for hypertension were reviewed and noted below.   Hypertension Medications               amLODIPine (NORVASC) 10 MG tablet Take 1 tablet (10 mg total) by mouth once daily.    furosemide (LASIX) 40 MG tablet Take 1 tablet (40 mg total) by mouth once daily.    lisinopriL (PRINIVIL,ZESTRIL) 20 MG tablet Take 1 tablet (20 mg total) by mouth once daily.    metoprolol succinate (TOPROL-XL) 50 MG 24 hr tablet Take 1 tablet (50 mg total) by mouth once daily.          While in the hospital, will manage blood pressure as follows; Continue home antihypertensive regimen except for p.o. Lasix given he is currently on IV Lasix    Will utilize p.r.n. blood pressure medication only if patient's blood pressure greater than 160/100 and he develops symptoms such as worsening chest pain or shortness of breath.

## 2023-12-14 NOTE — PT/OT/SLP PROGRESS
Physical Therapy  Treatment    Roland Ng   MRN: [id number]   Admitting Diagnosis: Atrial flutter    PT Received On: 12/14/23  PT Start Time: 1045     PT Stop Time: 1110    PT Total Time (min): 25 min       Billable Minutes:  Gait Training 15 and Therapeutic Activity 10    Treatment Type: Treatment  PT/PTA: PTA     Number of PTA visits since last PT visit: 2       General Precautions: Standard, fall  Orthopedic Precautions: N/A  Braces: N/A  Respiratory Status: Room air    Spiritual, Cultural Beliefs, Confucianist Practices, Values that Affect Care: no    Subjective:  Communicated with patient's nurse, Mariola, and completed Epic chart review prior to session.  Patient agreed to PT session. Reports increased fatigue from not sleeping well last night.     Pain/Comfort  Pain Rating 1: 0/10  Pain Rating Post-Intervention 1: 0/10    Objective:   Patient found with: telemetry, peripheral IV    Supine > sit EOB: Modified Independent    Forward scoot towards EOB: Modified Independent    STS from EOB > RW: SBA    250ft w/ RW SBA    Stand pivot T/F to EOB w/ RW: SBA    Reviewed AROM TE to BLE including: hip flex/ext, knee flex/ext, ankle PF/DF  To be completed a minimum of 10 reps for each LE in order to promote return of function, strength and ROM.     Educated patient on importance of increased tolerance to upright position and direct impact on CV endurance and strength. Patient encouraged to sit up in chair/ EOB, for a minimum of 2 consecutive hours, 3x per day. Encouraged patient to perform AROM TE to BLE throughout the day within all available planes of motion. Re enforced importance of utilizing call light to meet needs in room and not attempt to get up without staff assistance. Patient verbalized understanding and agreed to comply.     AM-PAC 6 CLICK MOBILITY  How much help from another person does this patient currently need?   1 = Unable, Total/Dependent Assistance  2 = A lot, Maximum/Moderate Assistance  3 = A  little, Minimum/Contact Guard/Supervision  4 = None, Modified [place]/Independent    Turning over in bed (including adjusting bedclothes, sheets and blankets)?: 4  Sitting down on and standing up from a chair with arms (e.g., wheelchair, bedside commode, etc.): 3  Moving from lying on back to sitting on the side of the bed?: 4  Moving to and from a bed to a chair (including a wheelchair)?: 3  Need to walk in hospital room?: 3  Climbing 3-5 steps with a railing?: 1 (NT)  Basic Mobility Total Score: 18    AM-PAC Raw Score CMS G-Code Modifier Level of Impairment Assistance   6 % Total / Unable   7 - 9 CM 80 - 100% Maximal Assist   10 - 14 CL 60 - 80% Moderate Assist   15 - 19 CK 40 - 60% Moderate Assist   20 - 22 CJ 20 - 40% Minimal Assist   23 CI 1-20% SBA / CGA   24 CH 0% Independent/ Mod I     Patient left sitting edge of bed (patient preference) with call button in reach and aide present.    Assessment:  Roland Ng is a 68 y.o. male with a medical diagnosis of Atrial flutter and presents with overall decline in functional mobility. Patient would continue to benefit from skilled PT to address functional limitations listed below in order to return to PLOF/decrease caregiver burden. Patient is progressing well towards goals established within PT POC.     Rehab identified problem list/impairments: weakness, impaired endurance, impaired self care skills, impaired functional mobility, gait instability, impaired balance, decreased lower extremity function, decreased safety awareness, edema, impaired cardiopulmonary response to activity    Rehab potential is good.    Activity tolerance: Good    Discharge recommendations: Low Intensity Therapy (SPOKE WITH SPV PT RE: CHANGE IN D/C REC)      Barriers to discharge:      Equipment recommendations: walker, rolling     GOALS:   Multidisciplinary Problems       Physical Therapy Goals          Problem: Physical Therapy    Goal Priority Disciplines Outcome  Goal Variances Interventions   Physical Therapy Goal     PT, PT/OT Ongoing, Progressing     Description: Goals to be met by 12/25/23.  1. Pt will complete bed mobility MIN A.  2. Pt will complete sit to stand CGA.  3. Pt will ambulate 200ft CGA using RW.  4. Pt will increase AMPAC score by 2 points to progress functional mobility.                       PLAN:    Patient to be seen 3 x/week to address the above listed problems via gait training, therapeutic activities, therapeutic exercises  Plan of Care expires: 12/25/23  Plan of Care reviewed with: patient         12/14/2023

## 2023-12-14 NOTE — ASSESSMENT & PLAN NOTE
Patient with Paroxysmal (<7 days) atrial fibrillation which is uncontrolled currently with Beta Blocker and Calcium Channel Blocker. Patient is currently in atrial fibrillation.[id number] Score: 3. Anticoagulation indicated. Anticoagulation done with Eliquis .

## 2023-12-14 NOTE — ASSESSMENT & PLAN NOTE
Multifactorial secondary to lymphedema and CHF exacerbation   Wound care consulted, recommended outpatient referral to lymphedema clinic  Continue diuresis

## 2023-12-14 NOTE — SUBJECTIVE & OBJECTIVE
Review of Systems   Constitutional: Positive for malaise/fatigue.   HENT: Negative.     Eyes: Negative.    Cardiovascular:  Positive for dyspnea on exertion and leg swelling.   Respiratory:  Positive for shortness of breath.    Endocrine: Negative.    Hematologic/Lymphatic: Negative.    Skin: Negative.    Musculoskeletal: Negative.    Gastrointestinal:  Positive for bloating.   Genitourinary: Negative.    Neurological: Negative.    Psychiatric/Behavioral: Negative.     Allergic/Immunologic: Negative.      Objective:     Vital Signs (Most Recent):  Temp: 98.9 °F (37.2 °C) (12/14/23 1406)  Pulse: 63 (12/14/23 1406)  Resp: 18 (12/14/23 1406)  BP: (!) 97/55 (12/14/23 1406)  SpO2: 99 % (12/14/23 1406) Vital Signs (24h Range):  Temp:  [97.7 °F (36.5 °C)-98.9 °F (37.2 °C)] 98.9 °F (37.2 °C)  Pulse:  [42-99] 63  Resp:  [18] 18  SpO2:  [95 %-100 %] 99 %  BP: ()/(55-63) 97/55     Weight: (!) 143 kg (315 lb 4.1 oz)  Body mass index is 43.97 kg/m².     SpO2: 99 %         Intake/Output Summary (Last 24 hours) at 12/14/2023 1450  Last data filed at 12/14/2023 0400  Gross per 24 hour   Intake 200 ml   Output 2925 ml   Net -2725 ml       Lines/Drains/Airways       Peripheral Intravenous Line  Duration                  Peripheral IV - Single Lumen 12/11/23 0120 20 G Anterior;Left Forearm 3 days                       Physical Exam  Vitals and nursing note reviewed.   Constitutional:       General: He is not in acute distress.     Appearance: Normal appearance. He is well-developed. He is obese. He is not diaphoretic.   HENT:      Head: Normocephalic and atraumatic.   Eyes:      General:         Right eye: No discharge.         Left eye: No discharge.      Pupils: Pupils are equal, round, and reactive to light.   Cardiovascular:      Rate and Rhythm: Normal rate. Rhythm irregularly irregular.      Heart sounds: Normal heart sounds, S1 normal and S2 normal. No murmur heard.     No S4 sounds.   Pulmonary:      Effort: Pulmonary  "effort is normal. No respiratory distress.      Breath sounds: Rales present. No wheezing.   Abdominal:      General: There is no distension.   Musculoskeletal:      Right lower leg: Edema present.      Left lower leg: Edema present.   Skin:     General: Skin is warm and dry.      Findings: No erythema.   Neurological:      Mental Status: He is alert and oriented to person, place, and time.   Psychiatric:         Mood and Affect: Mood normal.         Behavior: Behavior normal.         Thought Content: Thought content normal.            Significant Labs: CMP   Recent Labs   Lab 12/12/23  2300 12/13/23  0548 12/14/23  0602    141 139   K 3.6 3.8 3.6    101 98   CO2 28 29 29   * 122* 109   BUN 19 18 23   CREATININE 1.2 1.0 1.1   CALCIUM 9.4 9.3 9.6   ANIONGAP 12 11 12   , CBC No results for input(s): "WBC", "HGB", "HCT", "PLT" in the last 48 hours., Troponin No results for input(s): "TROPONINI" in the last 48 hours., and All pertinent lab results from the last 24 hours have been reviewed.    Significant Imaging: Echocardiogram: Transthoracic echo (TTE) complete ([facility] Only):   Results for orders placed or performed during the hospital encounter of 12/09/23   Echo   Result Value Ref Range    BSA 2.62 m2    LVOT stroke volume 44.74 cm3    LVIDd 5.31 3.5 - 6.0 cm    LV Systolic Volume 81.84 mL    LV Systolic Volume Index 32.5 mL/m2    LVIDs 4.27 (A) 2.1 - 4.0 cm    LV Diastolic Volume 135.86 mL    LV Diastolic Volume Index 53.91 mL/m2    IVS 1.28 (A) 0.6 - 1.1 cm    LVOT diameter 2.07 cm    LVOT area 3.4 cm2    FS 20 (A) 28 - 44 %    Left Ventricle Relative Wall Thickness 0.49 cm    Posterior Wall 1.29 (A) 0.6 - 1.1 cm    LV mass 283.13 g    LV Mass Index 112 g/m2    MV Peak E Ishmael 1.08 m/s    TDI LATERAL 0.11 m/s    TDI SEPTAL 0.08 m/s    E/E' ratio 11.37 m/s    MV Peak A Ishmael 0.23 m/s    TR Max Ishmael 2.78 m/s    E/A ratio 4.70     IVRT 31.40 msec    E wave deceleration time 167.06 msec    LV SEPTAL E/E' " RATIO 13.50 m/s    LV LATERAL E/E' RATIO 9.82 m/s    LVOT peak ria 0.68 m/s    Left Ventricular Outflow Tract Mean Velocity 0.44 cm/s    Left Ventricular Outflow Tract Mean Gradient 0.91 mmHg    RV mid diameter 4.02 cm    RVOT peak VTI 9.8 cm    TAPSE 1.70 cm    LA size 5.35 cm    Left Atrium Major Axis 6.56 cm    RA Major Axis 5.76 cm    AV mean gradient 3 mmHg    AV peak gradient 4 mmHg    Ao peak ria 1.04 m/s    Ao VTI 17.90 cm    LVOT peak VTI 13.30 cm    AV valve area 2.50 cm²    AV Velocity Ratio 0.65     AV index (prosthetic) 0.74     MARILYN by Velocity Ratio 2.20 cm²    Mr max ria 4.52 m/s    MV stenosis pressure 1/2 time 48.45 ms    MV valve area p 1/2 method 4.54 cm2    Triscuspid Valve Regurgitation Peak Gradient 31 mmHg    PV mean gradient 1 mmHg    RVOT peak ria 0.56 m/s    Ao root annulus 3.22 cm    STJ 3.20 cm    Ascending aorta 3.27 cm    IVC diameter 1.93 cm    Mean e' 0.10 m/s    ZLVIDS -5.62     ZLVIDD -10.17     LA WIDTH 4.7 cm    RA Width 4.4 cm    EF 40 %    TV resting pulmonary artery pressure 39 mmHg    RV TB RVSP 11 mmHg    Est. RA pres 8 mmHg    Narrative      Left Ventricle: The left ventricle is normal in size. Normal wall   thickness. There is concentric remodeling. Mild global hyperkinesis   present. There is reduced systolic function. Ejection fraction by visual   approximation is 40%. There is normal diastolic function.    Right Ventricle: Moderate right ventricular enlargement. Wall thickness   is normal. Systolic function is moderately reduced.    Mitral Valve: There is mild regurgitation.    Tricuspid Valve: There is moderate regurgitation.    Pulmonary Artery: The estimated pulmonary artery systolic pressure is   39 mmHg.    IVC/SVC: Intermediate venous pressure at 8 mmHg.     , EKG: Reviewed, and X-Ray: CXR: X-Ray Chest 1 View (CXR): No results found for this visit on 12/09/23. and X-Ray Chest PA and Lateral (CXR): No results found for this visit on 12/09/23.

## 2023-12-14 NOTE — ASSESSMENT & PLAN NOTE
In setting of CHF exacerbation  Improving although continues to still have dyspnea with exertion

## 2023-12-15 PROBLEM — I89.0 LYMPHEDEMA: Status: ACTIVE | Noted: 2023-12-15

## 2023-12-15 LAB
ANION GAP SERPL CALC-SCNC: 14 MMOL/L (ref 8–16)
APTT PPP: 52.7 SEC (ref 21–32)
BASOPHILS # BLD AUTO: 0.02 K/UL (ref 0–0.2)
BASOPHILS NFR BLD: 0.2 % (ref 0–1.9)
BUN SERPL-MCNC: 27 MG/DL (ref 8–23)
CALCIUM SERPL-MCNC: 9.9 MG/DL (ref 8.7–10.5)
CHLORIDE SERPL-SCNC: 96 MMOL/L (ref 95–110)
CO2 SERPL-SCNC: 29 MMOL/L (ref 23–29)
CREAT SERPL-MCNC: 1.3 MG/DL (ref 0.5–1.4)
DIFFERENTIAL METHOD BLD: ABNORMAL
EOSINOPHIL # BLD AUTO: 0.2 K/UL (ref 0–0.5)
EOSINOPHIL NFR BLD: 2.4 % (ref 0–8)
ERYTHROCYTE [DISTWIDTH] IN BLOOD BY AUTOMATED COUNT: 15.4 % (ref 11.5–14.5)
EST. GFR  (NO RACE VARIABLE): >60 ML/MIN/1.73 M^2
GLUCOSE SERPL-MCNC: 120 MG/DL (ref 70–110)
HCT VFR BLD AUTO: 46.6 % (ref 40–54)
HGB BLD-MCNC: 15.7 G/DL (ref 14–18)
IMM GRANULOCYTES # BLD AUTO: 0.03 K/UL (ref 0–0.04)
IMM GRANULOCYTES NFR BLD AUTO: 0.4 % (ref 0–0.5)
LYMPHOCYTES # BLD AUTO: 2 K/UL (ref 1–4.8)
LYMPHOCYTES NFR BLD: 24.8 % (ref 18–48)
MAGNESIUM SERPL-MCNC: 2 MG/DL (ref 1.6–2.6)
MCH RBC QN AUTO: 25.7 PG (ref 27–31)
MCHC RBC AUTO-ENTMCNC: 33.7 G/DL (ref 32–36)
MCV RBC AUTO: 76 FL (ref 82–98)
MONOCYTES # BLD AUTO: 0.8 K/UL (ref 0.3–1)
MONOCYTES NFR BLD: 10.1 % (ref 4–15)
NEUTROPHILS # BLD AUTO: 5.1 K/UL (ref 1.8–7.7)
NEUTROPHILS NFR BLD: 62.1 % (ref 38–73)
NRBC BLD-RTO: 0 /100 WBC
PLATELET # BLD AUTO: 231 K/UL (ref 150–450)
PMV BLD AUTO: 11.9 FL (ref 9.2–12.9)
POTASSIUM SERPL-SCNC: 3.9 MMOL/L (ref 3.5–5.1)
RBC # BLD AUTO: 6.1 M/UL (ref 4.6–6.2)
SODIUM SERPL-SCNC: 139 MMOL/L (ref 136–145)
WBC # BLD AUTO: 8.22 K/UL (ref 3.9–12.7)

## 2023-12-15 PROCEDURE — 97110 THERAPEUTIC EXERCISES: CPT

## 2023-12-15 PROCEDURE — C1751 CATH, INF, PER/CENT/MIDLINE: HCPCS

## 2023-12-15 PROCEDURE — 36415 COLL VENOUS BLD VENIPUNCTURE: CPT | Performed by: INTERNAL MEDICINE

## 2023-12-15 PROCEDURE — 80048 BASIC METABOLIC PNL TOTAL CA: CPT | Performed by: HOSPITALIST

## 2023-12-15 PROCEDURE — 25000003 PHARM REV CODE 250: Performed by: INTERNAL MEDICINE

## 2023-12-15 PROCEDURE — 97530 THERAPEUTIC ACTIVITIES: CPT

## 2023-12-15 PROCEDURE — 97116 GAIT TRAINING THERAPY: CPT

## 2023-12-15 PROCEDURE — 25000003 PHARM REV CODE 250: Performed by: HOSPITALIST

## 2023-12-15 PROCEDURE — 85730 THROMBOPLASTIN TIME PARTIAL: CPT | Performed by: INTERNAL MEDICINE

## 2023-12-15 PROCEDURE — 93010 ELECTROCARDIOGRAM REPORT: CPT | Mod: ,,, | Performed by: INTERNAL MEDICINE

## 2023-12-15 PROCEDURE — 99232 SBSQ HOSP IP/OBS MODERATE 35: CPT | Mod: 25,,, | Performed by: PHYSICIAN ASSISTANT

## 2023-12-15 PROCEDURE — 25000003 PHARM REV CODE 250: Performed by: STUDENT IN AN ORGANIZED HEALTH CARE EDUCATION/TRAINING PROGRAM

## 2023-12-15 PROCEDURE — 83735 ASSAY OF MAGNESIUM: CPT | Performed by: INTERNAL MEDICINE

## 2023-12-15 PROCEDURE — 99499 UNLISTED E&M SERVICE: CPT | Mod: ,,, | Performed by: INTERNAL MEDICINE

## 2023-12-15 PROCEDURE — 63600175 PHARM REV CODE 636 W HCPCS: Performed by: STUDENT IN AN ORGANIZED HEALTH CARE EDUCATION/TRAINING PROGRAM

## 2023-12-15 PROCEDURE — 21400001 HC TELEMETRY ROOM

## 2023-12-15 PROCEDURE — 85025 COMPLETE CBC W/AUTO DIFF WBC: CPT | Performed by: INTERNAL MEDICINE

## 2023-12-15 PROCEDURE — 36410 VNPNXR 3YR/> PHY/QHP DX/THER: CPT

## 2023-12-15 PROCEDURE — 93005 ELECTROCARDIOGRAM TRACING: CPT

## 2023-12-15 RX ADMIN — ASPIRIN 81 MG CHEWABLE TABLET 81 MG: 81 TABLET CHEWABLE at 09:12

## 2023-12-15 RX ADMIN — METOPROLOL SUCCINATE 50 MG: 50 TABLET, EXTENDED RELEASE ORAL at 09:12

## 2023-12-15 RX ADMIN — MAGNESIUM OXIDE TAB 400 MG (241.3 MG ELEMENTAL MG) 400 MG: 400 (241.3 MG) TAB at 09:12

## 2023-12-15 RX ADMIN — FUROSEMIDE 60 MG: 10 INJECTION, SOLUTION INTRAMUSCULAR; INTRAVENOUS at 09:12

## 2023-12-15 RX ADMIN — LISINOPRIL 20 MG: 20 TABLET ORAL at 09:12

## 2023-12-15 RX ADMIN — SENNOSIDES AND DOCUSATE SODIUM 2 TABLET: 8.6; 5 TABLET ORAL at 08:12

## 2023-12-15 RX ADMIN — HEPARIN SODIUM 19 UNITS/KG/HR: 10000 INJECTION, SOLUTION INTRAVENOUS at 07:12

## 2023-12-15 RX ADMIN — CETIRIZINE HYDROCHLORIDE 10 MG: 10 TABLET, FILM COATED ORAL at 09:12

## 2023-12-15 RX ADMIN — METOLAZONE 5 MG: 5 TABLET ORAL at 09:12

## 2023-12-15 RX ADMIN — METOPROLOL SUCCINATE 50 MG: 50 TABLET, EXTENDED RELEASE ORAL at 08:12

## 2023-12-15 RX ADMIN — FUROSEMIDE 60 MG: 10 INJECTION, SOLUTION INTRAMUSCULAR; INTRAVENOUS at 11:12

## 2023-12-15 RX ADMIN — HEPARIN SODIUM 19 UNITS/KG/HR: 10000 INJECTION, SOLUTION INTRAVENOUS at 11:12

## 2023-12-15 RX ADMIN — METHIMAZOLE 5 MG: 5 TABLET ORAL at 09:12

## 2023-12-15 NOTE — PROGRESS NOTES
[facility] Medicine  Progress Note    Patient Name: Roland Ng  MRN: [id number]  Patient Class: IP- Inpatient   Admission Date: 12/9/2023  Length of Stay: 4 days  Attending Physician: Solange Blanchard DO  Primary Care Provider: Janis Green NP        Subjective:     Principal Problem:Atrial flutter        HPI:  Roland Ng is a 68 y.o. male with a PMH  has a past medical history of CAD (coronary artery disease), Chronic congestive heart failure (01/13/2021), Diabetes mellitus with no complication, Hypertension associated with diabetes, Hyperthyroidism (01/13/2021), Morbid obesity with body mass index (BMI) of 40.0 to 44.9 in adult, and Paroxysmal A-fib. who presented to the ED for further evaluation of progressively worsening dyspnea/shortness a breath was well as bilateral lower extremity, scrotal, and penile swelling/edema.  Patient reports significant history of heart failure and was previously followed by Dr. Decker but stated he is not seen him in quite some time.  Patient also reported being off his home medications for approximately 3-4 months prior to getting back on them due to insurance related issues and has been compliant over the past 3 months.  Patient also reports non adherence to fluid/dietary restrictions and states he drinks approximately 20 bottles of water daily.  He reported no known alleviating or aggravating factors noted and reported associated symptoms included dyspnea both at rest and on exertion, orthopnea, PND, and wheezing in addition to reported swelling as noted above.  Patient reports negative history of asthma/COPD, does not use home oxygen, and does not use home CPAP.  All other review of systems negative except as noted above.  Initial workup in the ED concerning for signs/symptoms of acute CHF exacerbation with patient being admitted to Hospital Medicine inpatient for continued medical management.      PCP: Janis Green  N.      Overview/Hospital Course:  Cardiology consulted.  Patient currently being treated for CHF exacerbation with IV diuretics.  Given persistent atrial flutter, planned for ZOE/cardioversion once euvolemic.    PT/OT recommends moderate intensity therapy on discharge. SW consulted for SNF placement.     Interval History: No acute events overnight.  Seen and examined without any family present resting comfortably in bed.  Reports he is feeling well, denies any chest pain.  Shortness of breath with activities is continuing to decrease.  No new complaints. Continuing to diurese well.       Review of Systems  Objective:     Vital Signs (Most Recent):  Temp: 98.2 °F (36.8 °C) (12/14/23 1751)  Pulse: 66 (12/14/23 1751)  Resp: 18 (12/14/23 1751)  BP: (!) 124/56 (12/14/23 1751)  SpO2: 96 % (12/14/23 1751) Vital Signs (24h Range):  Temp:  [97.7 °F (36.5 °C)-98.9 °F (37.2 °C)] 98.2 °F (36.8 °C)  Pulse:  [42-99] 66  Resp:  [18] 18  SpO2:  [95 %-99 %] 96 %  BP: ()/(55-63) 124/56     Weight: (!) 143 kg (315 lb 4.1 oz)  Body mass index is 43.97 kg/m².    Intake/Output Summary (Last 24 hours) at 12/14/2023 1847  Last data filed at 12/14/2023 1300  Gross per 24 hour   Intake --   Output 3375 ml   Net -3375 ml         Physical Exam  Vitals and nursing note reviewed. Exam conducted with a chaperone present.   Constitutional:       General: He is not in acute distress.     Appearance: He is obese. He is not ill-appearing or diaphoretic.   HENT:      Head: Normocephalic and atraumatic.      Right Ear: External ear normal.      Left Ear: External ear normal.      Nose: Nose normal.      Mouth/Throat:      Mouth: Mucous membranes are moist.   Eyes:      Extraocular Movements: Extraocular movements intact.      Pupils: Pupils are equal, round, and reactive to light.   Cardiovascular:      Rate and Rhythm: Normal rate and regular rhythm.   Pulmonary:      Effort: Pulmonary effort is normal. No respiratory distress.      Breath  sounds: Rales present. No wheezing.      Comments: On room air  Abdominal:      General: Bowel sounds are normal.      Palpations: Abdomen is soft.      Tenderness: There is no abdominal tenderness. There is no guarding or rebound.   Musculoskeletal:      Cervical back: Neck supple.      Right lower leg: Pitting Edema present.      Left lower leg: Pitting Edema present.      Comments: Lymphedema noted on bilateral lower extremities.  Edema continuing to improve   Skin:     General: Skin is warm and dry.   Neurological:      General: No focal deficit present.      Mental Status: He is alert and oriented to person, place, and time.   Psychiatric:         Mood and Affect: Mood normal.             Significant Labs: All pertinent labs within the past 24 hours have been reviewed.  CBC:   Recent Labs   Lab 12/14/23  1442   WBC 9.99   HGB 15.8   HCT 47.4        CMP:   Recent Labs   Lab 12/12/23  2300 12/13/23  0548 12/14/23  0602    141 139   K 3.6 3.8 3.6    101 98   CO2 28 29 29   * 122* 109   BUN 19 18 23   CREATININE 1.2 1.0 1.1   CALCIUM 9.4 9.3 9.6   ANIONGAP 12 11 12     Magnesium:   Recent Labs   Lab 12/12/23  2300 12/14/23  0602   MG 2.3 2.0       Significant Imaging: I have reviewed all pertinent imaging results/findings within the past 24 hours.    Assessment/Plan:      * Atrial flutter  Remains in atrial flutter  Continue heparin infusion  Planned for ZOE/cardioversion once more euvolemic as per Cardiology, tentatively scheduled for tomorrow      Scrotal swelling  Ultrasound showed large bilateral hydrocele with extensive scrotal wall thickening or edema, no abscess   Likely secondary to excessive volume in setting of CHF exacerbation      Lower extremity edema  Multifactorial secondary to lymphedema and CHF exacerbation   Wound care consulted, recommended outpatient referral to lymphedema clinic  Continue diuresis      SOB (shortness of breath)  In setting of CHF  exacerbation  Improving although continues to still have dyspnea with exertion       Paroxysmal A-fib  Patient with Paroxysmal (<7 days) atrial fibrillation which is uncontrolled currently with Beta Blocker and Calcium Channel Blocker. Patient is currently in atrial fibrillation.[id number] Score: 3. Anticoagulation indicated. Anticoagulation done with Eliquis .      Atherosclerosis of native coronary artery of native heart without angina pectoris  Patient with known CAD s/p  LHC without stent placement , which is controlled Will continue  Eliquis, ASA, and Statin and monitor for S/Sx of angina/ACS. Continue to monitor on telemetry.       Morbid obesity with body mass index (BMI) of 40.0 to 44.9 in adult  Body mass index is 43.97 kg/m². Morbid obesity complicates all aspects of disease management from diagnostic modalities to treatment. Weight loss encouraged and health benefits explained to patient.       Hypertension  Chronic, controlled. Latest blood pressure and vitals reviewed-     Temp:  [97.7 °F (36.5 °C)-98.9 °F (37.2 °C)]   Pulse:  [42-99]   Resp:  [18]   BP: ()/(55-63)   SpO2:  [95 %-99 %] .   Home meds for hypertension were reviewed and noted below.   Hypertension Medications               amLODIPine (NORVASC) 10 MG tablet Take 1 tablet (10 mg total) by mouth once daily.    furosemide (LASIX) 40 MG tablet Take 1 tablet (40 mg total) by mouth once daily.    lisinopriL (PRINIVIL,ZESTRIL) 20 MG tablet Take 1 tablet (20 mg total) by mouth once daily.    metoprolol succinate (TOPROL-XL) 50 MG 24 hr tablet Take 1 tablet (50 mg total) by mouth once daily.          While in the hospital, will manage blood pressure as follows; Continue home antihypertensive regimen except for p.o. Lasix given he is currently on IV Lasix    Will utilize p.r.n. blood pressure medication only if patient's blood pressure greater than 160/100 and he develops symptoms such as worsening chest pain or shortness of breath.      Acute  exacerbation of CHF (congestive heart failure)  Patient is identified as having Diastolic (HFpEF) heart failure that is Acute on chronic. CHF is currently uncontrolled due to Continued edema of extremities and Dyspnea not returned to baseline after single doses of IV diuretic. Latest ECHO performed and demonstrates- Results for orders placed during the hospital encounter of 03/08/22    Echo    Interpretation Summary  · The left ventricle is normal in size with concentric hypertrophy and normal systolic function.  · Mild left atrial enlargement.  · Indeterminate left ventricular diastolic function.  · The estimated PA systolic pressure is 18 mmHg.  · Normal right ventricular size with normal right ventricular systolic function.  · Intermediate central venous pressure (8 mmHg).  · The estimated ejection fraction is 65%.  · Mild pulmonic regurgitation.  · Mild mitral regurgitation.  Continue Beta Blocker, ACE/ARB, and Furosemide and monitor clinical status closely. Monitor on telemetry. Patient is on CHF pathway.  Monitor strict Is&Os and daily weights.  Place on fluid restriction of 1.5 L. Cardiology has been consulted. Continue to stress to patient importance of self efficacy and  on diet for CHF. Last BNP reviewed- and noted below   Recent Labs   Lab 12/09/23  2302   *       -telemetry  -continue IV diuretics  -bipap qhs prn  -weaned down O2 as able      Hyperthyroidism  Patient has chronic hyperthyroidism. TFTs reviewed-   Lab Results   Component Value Date    TSH 0.083 (L) 12/10/2023     Although TSH is slightly low, free T4 is within normal limits  Will continue home methimazole  Outpatient follow-up for further monitoring and medication titration as needed      VTE Risk Mitigation (From admission, onward)           Ordered     heparin 25,000 units in dextrose 5% 250 mL (100 units/mL) infusion LOW INTENSITY nomogram - [facility]  Continuous        Question:  Begin at (units/kg/hr)  Answer:  12    12/10/23  1752     heparin 25,000 units in dextrose 5% (100 units/ml) IV bolus from bag - ADDITIONAL PRN BOLUS - 60 units/kg  As needed (PRN)        Question:  Heparin Infusion Adjustment (DO NOT MODIFY ANSWER)  Answer:  [email or web address]\Images\Pharmacy\HeparinInfusions\heparin LOW INTENSITY nomogram for [facility] [id number].pdf    12/10/23 1752     heparin 25,000 units in dextrose 5% (100 units/ml) IV bolus from bag - ADDITIONAL PRN BOLUS - 30 units/kg  As needed (PRN)        Question:  Heparin Infusion Adjustment (DO NOT MODIFY ANSWER)  Answer:  [email or web address]\epic\Images\Pharmacy\HeparinInfusions\heparin LOW INTENSITY nomogram for [facility] [id number].pdf    12/10/23 1752     Reason for No Pharmacological VTE Prophylaxis  Once        Question:  Reasons:  Answer:  Already adequately anticoagulated on oral Anticoagulants    12/10/23 0102     IP VTE HIGH RISK PATIENT  Once         12/10/23 0102     Place sequential compression device  Until discontinued         12/10/23 0102                    Discharge Planning   RABIA:      Code Status: Full Code   Is the patient medically ready for discharge?:     Reason for patient still in hospital (select all that apply): Patient trending condition, Treatment, and Consult recommendations  Discharge Plan A: Skilled Nursing Facility                  Solange Blanchard DO  Department of Hospital Medicine   [facility] - Med Surg

## 2023-12-15 NOTE — PT/OT/SLP PROGRESS
Occupational Therapy  Treatment    Roland Ng   MRN: [id number]   Admitting Diagnosis: Atrial flutter    OT Date of Treatment: 12/15/23   OT Start Time: 1200  OT Stop Time: 1223  OT Total Time (min): 23 min    Billable Minutes:  Therapeutic Activity 23 MINUTES    OT/FEDERICO: OT          General Precautions: Standard, fall  Orthopedic Precautions: N/A  Braces: N/A  Respiratory Status: Room air         Subjective:  Communicated with NURSE AND EPIC CHART REVIEW prior to session.  Pt displayed slow processing skills during tx session.    Pain/Comfort  Pain Rating 1: 0/10    Objective:  Patient found with: telemetry, peripheral IV     Functional Mobility: MAX VC'S FOR HAND PLACEMENT AND CORRECT TECHNIQUE  Bed Mobility:  CGA WITH ROLLING   SEATED SCOOTING CGA  CGA SUPINE< SIT TRANSFERS    Transfers:   SIT<STAND TRANSFER MIN A<CGA    Functional Ambulation: PT AMBULATED 300 FEET WITH S    Activities of Daily Living:   UE MIN A WITH Critical access hospital   Balance:   Static Sit: FAIR+: Able to take MINIMAL challenges from all directions  Dynamic Sit: FAIR: Cannot move trunk without losing balance  Static Stand: FAIR+: Takes MINIMAL challenges from all directions  Dynamic stand: FAIR+: Needs CLOSE SUPERVISION during gait and is able to right self with minor LOB    Therapeutic Activities and Exercises:    Educated patient on importance of increased tolerance to upright position and direct impact on CV endurance and strength. Patient encouraged to sit up in chair/ EOB, for a minimum of 2 consecutive hours including for all meals.. Encouraged patient to perform AROM TE to BUE throughout the day within all available planes of motion. Re enforced importance of utilizing call light to meet needs in room and not attempt to get up without staff assistance. Patient verbalized understanding and agreed to comply.         AM-PAC 6 CLICK ADL   How much help from another person does this patient currently need?   1 = Unable,  "Total/Dependent Assistance  2 = A lot, Maximum/Moderate Assistance  3 = A little, Minimum/Contact Guard/Supervision  4 = None, Modified [place]/Independent    Putting on and taking off regular lower body clothing? : 3  Bathing (including washing, rinsing, drying)?: 2  Toileting, which includes using toilet, bedpan, or urinal? : 2  Putting on and taking off regular upper body clothing?: 3  Taking care of personal grooming such as brushing teeth?: 3  Eating meals?: 3  Daily Activity Total Score: 16     AM-PAC Raw Score CMS "G-Code Modifier Level of Impairment Assistance   6 % Total / Unable   7 - 8 CM 80 - 100% Maximal Assist   9-13 CL 60 - 80% Moderate Assist   14 - 19 CK 40 - 60% Moderate Assist   20 - 22 CJ 20 - 40% Minimal Assist   23 CI 1-20% SBA / CGA   24 CH 0% Independent/ Mod I       Patient left up in chair with all lines intact, call button in reach, and nurse notified    ASSESSMENT:  Roland Ng is a 68 y.o. male with a medical diagnosis of Atrial flutter and presents with debility and generalized weakness.    Rehab identified problem list/impairments:  weakness, impaired functional mobility, impaired cognition, decreased safety awareness, impaired endurance, gait instability, impaired balance, impaired self care skills    Rehab potential is good.    Activity tolerance: Good    Discharge recommendations: Low Intensity Therapy   Barriers to discharge: Barriers to Discharge: Decreased caregiver support    Equipment recommendations: walker, rolling    GOALS:   Multidisciplinary Problems       Occupational Therapy Goals          Problem: Occupational Therapy    Goal Priority Disciplines Outcome Interventions   Occupational Therapy Goal     OT, PT/OT     Description: Goals to be met by: 12/25/23     Patient will increase functional independence with ADLs by performing:    UE Dressing with Stand-by Assistance.  Grooming while standing at sink with Contact Guard Assistance.  Toileting from " bedside commode with Set-up Assistance for hygiene and clothing management.   Toilet transfer to bedside commode with Supervision with RW.  Upper extremity exercise program x15 reps per handout, with independence.                         Plan:  Patient to be seen 2 x/week to address the above listed problems via self-care/home management, therapeutic activities, therapeutic exercises  Plan of Care expires: 12/25/23  Plan of Care reviewed with: patient         12/15/2023

## 2023-12-15 NOTE — ASSESSMENT & PLAN NOTE
Chronic, controlled. Latest blood pressure and vitals reviewed-     Temp:  [97.8 °F (36.6 °C)-98.2 °F (36.8 °C)]   Pulse:  [42-85]   Resp:  [18-19]   BP: ()/(52-87)   SpO2:  [93 %-97 %] .   Home meds for hypertension were reviewed and noted below.   Hypertension Medications               amLODIPine (NORVASC) 10 MG tablet Take 1 tablet (10 mg total) by mouth once daily.    furosemide (LASIX) 40 MG tablet Take 1 tablet (40 mg total) by mouth once daily.    lisinopriL (PRINIVIL,ZESTRIL) 20 MG tablet Take 1 tablet (20 mg total) by mouth once daily.    metoprolol succinate (TOPROL-XL) 50 MG 24 hr tablet Take 1 tablet (50 mg total) by mouth once daily.          While in the hospital, will manage blood pressure as follows; Continue home antihypertensive regimen except for p.o. Lasix given he is currently on IV Lasix    Will utilize p.r.n. blood pressure medication only if patient's blood pressure greater than 160/100 and he develops symptoms such as worsening chest pain or shortness of breath.

## 2023-12-15 NOTE — SUBJECTIVE & OBJECTIVE
Interval History: No acute events overnight.  Seen and examined without any family present.  Reports he feels well, shortness of breath with activity continuing to decrease.  Denies any other complaints at this time including chest pain, palpitation, nausea.  Initially planned for danielle/cardioversion this morning, however this was deferred as Cardiology would prefer to optimize him more, tentatively planned for procedure to be done on Monday.    Reviewed patient vitals, he was recorded to have multiple instances of heart rate in the 40s.  However telemetry review did not show any bradycardia, patient's heart rate has been staying mostly in the 80s and occasionally goes up over 100s for short period of time.      Review of Systems  Objective:     Vital Signs (Most Recent):  Temp: 97.8 °F (36.6 °C) (12/15/23 1129)  Pulse: 80 (12/15/23 1523)  Resp: 18 (12/15/23 1129)  BP: (!) 99/54 (12/15/23 1129)  SpO2: 96 % (12/15/23 1129) Vital Signs (24h Range):  Temp:  [97.8 °F (36.6 °C)-98.2 °F (36.8 °C)] 97.8 °F (36.6 °C)  Pulse:  [42-85] 43  Resp:  [18-19] 18  SpO2:  [93 %-97 %] 96 %  BP: ()/(52-87) 99/54     Weight: (!) 143 kg (315 lb 4.1 oz)  Body mass index is 43.97 kg/m².    Intake/Output Summary (Last 24 hours) at 12/15/2023 1521  Last data filed at 12/15/2023 0430  Gross per 24 hour   Intake 180 ml   Output 1825 ml   Net -1645 ml         Physical Exam  Vitals and nursing note reviewed.   Constitutional:       General: He is not in acute distress.     Appearance: He is obese. He is not ill-appearing or diaphoretic.   HENT:      Head: Normocephalic and atraumatic.      Right Ear: External ear normal.      Left Ear: External ear normal.      Nose: Nose normal.      Mouth/Throat:      Mouth: Mucous membranes are moist.   Eyes:      Extraocular Movements: Extraocular movements intact.      Pupils: Pupils are equal, round, and reactive to light.   Cardiovascular:      Rate and Rhythm: Normal rate and regular rhythm.    Pulmonary:      Effort: Pulmonary effort is normal. No respiratory distress.      Breath sounds: No wheezing or rales.      Comments: On room air  Abdominal:      General: Bowel sounds are normal.      Palpations: Abdomen is soft.      Tenderness: There is no abdominal tenderness. There is no guarding or rebound.   Musculoskeletal:      Cervical back: Neck supple.      Right lower leg: Pitting Edema present.      Left lower leg: Pitting Edema present.      Comments: Lymphedema noted on bilateral lower extremities.  Edema continuing to improve   Skin:     General: Skin is warm and dry.   Neurological:      General: No focal deficit present.      Mental Status: He is alert and oriented to person, place, and time.   Psychiatric:         Mood and Affect: Mood normal.             Significant Labs: All pertinent labs within the past 24 hours have been reviewed.  CBC:   Recent Labs   Lab 12/14/23  1442 12/15/23  0500   WBC 9.99 8.22   HGB 15.8 15.7   HCT 47.4 46.6    231     CMP:   Recent Labs   Lab 12/14/23  0602 12/15/23  0500    139   K 3.6 3.9   CL 98 96   CO2 29 29    120*   BUN 23 27*   CREATININE 1.1 1.3   CALCIUM 9.6 9.9   ANIONGAP 12 14       Significant Imaging: I have reviewed all pertinent imaging results/findings within the past 24 hours.

## 2023-12-15 NOTE — ASSESSMENT & PLAN NOTE
Patient with Paroxysmal (<7 days) atrial fibrillation which is uncontrolled currently with Beta Blocker and Calcium Channel Blocker. Patient is currently in atrial fibrillation.[id number] Score: 3. Anticoagulation indicated. Anticoagulation done with Eliquis .

## 2023-12-15 NOTE — ASSESSMENT & PLAN NOTE
Patient with Paroxysmal (<7 days) atrial fibrillation which is uncontrolled currently with Beta Blocker and Calcium Channel Blocker. Patient is currently in atrial fibrillation.[id number] Score: 3. Anticoagulation indicated. Anticoagulation done with Eliquis .

## 2023-12-15 NOTE — PROGRESS NOTES
[facility]  Cardiology  Progress Note    Patient Name: Roland Ng  MRN: [id number]  Admission Date: 12/9/2023  Hospital Length of Stay: 5 days  Code Status: Full Code   Attending Physician: Solange Blanchard DO   Primary Care Physician: Janis Green NP  Expected Discharge Date:   Principal Problem:Atrial flutter    Subjective:   HPI:  68 year old  male with PMHX of CHF and atrial fibrillation presented  to ED with worsening constant SOB x 1 month. Pt  reports sleeping with four pillows and waking up with SOB in the night.  Associated sxs include  intermittent sharp chest pain,  dizziness,  abdominal swelling and BLE  swelling. Pt states he is on a fluid  pill he takes BID since BLE  swelling and  venous stasis onset in 2016. Pt states abdominal swelling is new. Pt was seen in [facility]  ED on 12/523 for SOB and was instructed  to double Lasix dose. Noted HR  was in 130s in aflutter on 12/5, was seen at [facility].       Troponin was 0.031 yesterday. . Potassium  3.4. LFTs are elevated. LST 72/ WBC is 13.   EKG shows atrial flutter with variable AV block, Right bundle branch block, Septal infarct.   EF down to 40%, prior was 60% in March 2022    Hospital Course:   12/11/23-Patient seen and examined today, sitting up in bed. Feels ok. Remains SOB, needs continued IV diuresis. HR controlled. Labs reviewed.     12/12/23-Patient seen and examined today, sitting up in bed. Diuresing well. SOB improving but still significantly overloaded. HR variable. Labs reviewed. Creatinine 1.0, K 3.4. Needs ZOE/DCCV once compensated.    12/13/23-Patient seen and examined today, sitting up in bedside chair. Diuresing well. States he was able to ambulate down the hallway today. Still overloaded, continue diuretics. Labs reviewed/stable.    12/14/23-Patient seen and examined today, resting in bed. Feels ok. Diuresing well. SOB/edema improving. Creatinine stable. Plans for ZOE/DCCV once compensated.     12/15/23-Patient seen  and examined today, sitting up in bedside chair. Continues to improve. Starting to get wrinkles in BLE. SOB better. Creatinine 1.3, BUN up-trending.        Review of Systems   Constitutional: Positive for malaise/fatigue.   HENT: Negative.     Eyes: Negative.    Cardiovascular:  Positive for dyspnea on exertion, leg swelling and orthopnea.   Respiratory:  Positive for shortness of breath.    Endocrine: Negative.    Hematologic/Lymphatic: Negative.    Skin: Negative.    Musculoskeletal: Negative.    Gastrointestinal: Negative.    Genitourinary: Negative.    Neurological: Negative.    Psychiatric/Behavioral: Negative.     Allergic/Immunologic: Negative.      Objective:     Vital Signs (Most Recent):  Temp: 97.8 °F (36.6 °C) (12/15/23 1129)  Pulse: (!) 43 (12/15/23 1138)  Resp: 18 (12/15/23 1129)  BP: (!) 99/54 (12/15/23 1129)  SpO2: 96 % (12/15/23 1129) Vital Signs (24h Range):  Temp:  [97.8 °F (36.6 °C)-98.2 °F (36.8 °C)] 97.8 °F (36.6 °C)  Pulse:  [42-85] 43  Resp:  [18-19] 18  SpO2:  [93 %-97 %] 96 %  BP: ()/(52-87) 99/54     Weight: (!) 143 kg (315 lb 4.1 oz)  Body mass index is 43.97 kg/m².     SpO2: 96 %         Intake/Output Summary (Last 24 hours) at 12/15/2023 1508  Last data filed at 12/15/2023 0430  Gross per 24 hour   Intake 180 ml   Output 1825 ml   Net -1645 ml       Lines/Drains/Airways       Peripheral Intravenous Line  Duration                  Peripheral IV - Single Lumen 12/11/23 0120 20 G Anterior;Left Forearm 4 days                       Physical Exam  Vitals and nursing note reviewed.   Constitutional:       General: He is not in acute distress.     Appearance: Normal appearance. He is well-developed. He is obese. He is not diaphoretic.   HENT:      Head: Normocephalic and atraumatic.   Eyes:      General:         Right eye: No discharge.         Left eye: No discharge.      Pupils: Pupils are equal, round, and reactive to light.   Cardiovascular:      Rate and Rhythm: Normal rate. Rhythm  "irregularly irregular.      Heart sounds: Normal heart sounds, S1 normal and S2 normal. No murmur heard.  Pulmonary:      Effort: Pulmonary effort is normal. No respiratory distress.      Breath sounds: Normal breath sounds. No wheezing or rales.   Abdominal:      General: There is no distension.      Tenderness: There is no rebound.   Musculoskeletal:      Left lower leg: Edema present.   Skin:     General: Skin is warm and dry.      Findings: No erythema.   Neurological:      Mental Status: He is alert and oriented to person, place, and time.   Psychiatric:         Mood and Affect: Mood normal.         Behavior: Behavior normal.         Thought Content: Thought content normal.           Significant Labs: CMP   Recent Labs   Lab 12/14/23  0602 12/15/23  0500    139   K 3.6 3.9   CL 98 96   CO2 29 29    120*   BUN 23 27*   CREATININE 1.1 1.3   CALCIUM 9.6 9.9   ANIONGAP 12 14   , CBC   Recent Labs   Lab 12/14/23  1442 12/15/23  0500   WBC 9.99 8.22   HGB 15.8 15.7   HCT 47.4 46.6    231   , Troponin No results for input(s): "TROPONINI" in the last 48 hours., and All pertinent lab results from the last 24 hours have been reviewed.    Significant Imaging: Echocardiogram: Transthoracic echo (TTE) complete (Cupid Only):   Results for orders placed or performed during the hospital encounter of 12/09/23   Echo   Result Value Ref Range    BSA 2.62 m2    LVOT stroke volume 44.74 cm3    LVIDd 5.31 3.5 - 6.0 cm    LV Systolic Volume 81.84 mL    LV Systolic Volume Index 32.5 mL/m2    LVIDs 4.27 (A) 2.1 - 4.0 cm    LV Diastolic Volume 135.86 mL    LV Diastolic Volume Index 53.91 mL/m2    IVS 1.28 (A) 0.6 - 1.1 cm    LVOT diameter 2.07 cm    LVOT area 3.4 cm2    FS 20 (A) 28 - 44 %    Left Ventricle Relative Wall Thickness 0.49 cm    Posterior Wall 1.29 (A) 0.6 - 1.1 cm    LV mass 283.13 g    LV Mass Index 112 g/m2    MV Peak E Ishmael 1.08 m/s    TDI LATERAL 0.11 m/s    TDI SEPTAL 0.08 m/s    E/E' ratio 11.37 " m/s    MV Peak A Ishmael 0.23 m/s    TR Max Ishmael 2.78 m/s    E/A ratio 4.70     IVRT 31.40 msec    E wave deceleration time 167.06 msec    LV SEPTAL E/E' RATIO 13.50 m/s    LV LATERAL E/E' RATIO 9.82 m/s    LVOT peak ishmael 0.68 m/s    Left Ventricular Outflow Tract Mean Velocity 0.44 cm/s    Left Ventricular Outflow Tract Mean Gradient 0.91 mmHg    RV mid diameter 4.02 cm    RVOT peak VTI 9.8 cm    TAPSE 1.70 cm    LA size 5.35 cm    Left Atrium Major Axis 6.56 cm    RA Major Axis 5.76 cm    AV mean gradient 3 mmHg    AV peak gradient 4 mmHg    Ao peak ishmael 1.04 m/s    Ao VTI 17.90 cm    LVOT peak VTI 13.30 cm    AV valve area 2.50 cm²    AV Velocity Ratio 0.65     AV index (prosthetic) 0.74     MARILYN by Velocity Ratio 2.20 cm²    Mr max ishmael 4.52 m/s    MV stenosis pressure 1/2 time 48.45 ms    MV valve area p 1/2 method 4.54 cm2    Triscuspid Valve Regurgitation Peak Gradient 31 mmHg    PV mean gradient 1 mmHg    RVOT peak ishmael 0.56 m/s    Ao root annulus 3.22 cm    STJ 3.20 cm    Ascending aorta 3.27 cm    IVC diameter 1.93 cm    Mean e' 0.10 m/s    ZLVIDS -5.62     ZLVIDD -10.17     LA WIDTH 4.7 cm    RA Width 4.4 cm    EF 40 %    TV resting pulmonary artery pressure 39 mmHg    RV TB RVSP 11 mmHg    Est. RA pres 8 mmHg    Narrative      Left Ventricle: The left ventricle is normal in size. Normal wall   thickness. There is concentric remodeling. Mild global hyperkinesis   present. There is reduced systolic function. Ejection fraction by visual   approximation is 40%. There is normal diastolic function.    Right Ventricle: Moderate right ventricular enlargement. Wall thickness   is normal. Systolic function is moderately reduced.    Mitral Valve: There is mild regurgitation.    Tricuspid Valve: There is moderate regurgitation.    Pulmonary Artery: The estimated pulmonary artery systolic pressure is   39 mmHg.    IVC/SVC: Intermediate venous pressure at 8 mmHg.     , EKG: Reviewed, and X-Ray: CXR: X-Ray Chest 1 View (CXR): No  results found for this visit on 12/09/23. and X-Ray Chest PA and Lateral (CXR): No results found for this visit on 12/09/23.  Assessment and Plan:   Patient who presents with atrial flutter/decompensated CHF. Improving, continue IV diuresis. ZOE/DCCV Next week.    * Atrial flutter  Rate controlled with toprol xl 50 bid  Once euvolemic, if still symptomatic with SOB, will consider ZOE/Cardioversion vs EP evaluation for flutter/fib ablation    12/11/23  -Continue Toprol XL  -Continue heparin gtt  -Keep NPO after MN for possible ZOE/DCCV tmw    12/12/23  -Still volume overloaded, continue IV diuresis  -Continue BB, heparin gtt  -ZOE/DCCV once compensated    12/13/23  -See above    Lower extremity edema  -Assess response to IV diuresis  -CVI also contributing    Paroxysmal A-fib  -Continue anticoagulation and toprol xl    Morbid obesity with body mass index (BMI) of 40.0 to 44.9 in adult  -weight loss    Hypertension  Titrate medications     Acute exacerbation of CHF (congestive heart failure)  New onset CHF with EF 40%  Needs ischemia evaluation once euvolemic  Monitor I/Os  Continue IV diuresis     12/11/23  -Still overloaded  -Continue IV diuresis  -Continue ACEi, BB  -Strict I's/O's  -Ischemic workup once volume status improved    12/12/23  -Needs continued IV diuresis  -Continue BB, ACEi  -ZOE/DCCV and ischemic workup once compensated    12/13/23  -See above, needs continued IV diuresis    12/14/23  -Continue IV diuresis     12/15/23  -Improving, continued IV diuresis         VTE Risk Mitigation (From admission, onward)           Ordered     heparin 25,000 units in dextrose 5% 250 mL (100 units/mL) infusion LOW INTENSITY nomogram - [facility]  Continuous        Question:  Begin at (units/kg/hr)  Answer:  12    12/10/23 7110     heparin 25,000 units in dextrose 5% (100 units/ml) IV bolus from bag - ADDITIONAL PRN BOLUS - 60 units/kg  As needed (PRN)        Question:  Heparin Infusion Adjustment (DO NOT MODIFY ANSWER)   Answer:  [email or web address]\Images\Pharmacy\HeparinInfusions\heparin LOW INTENSITY nomogram for [facility] [id number]    12/10/23 1752     heparin 25,000 units in dextrose 5% (100 units/ml) IV bolus from bag - ADDITIONAL PRN BOLUS - 30 units/kg  As needed (PRN)        Question:  Heparin Infusion Adjustment (DO NOT MODIFY ANSWER)  Answer:  [email or web address]\Images\Pharmacy\HeparinInfusions\heparin LOW INTENSITY nomogram for [facility] [id number]    12/10/23 1752     Reason for No Pharmacological VTE Prophylaxis  Once        Question:  Reasons:  Answer:  Already adequately anticoagulated on oral Anticoagulants    12/10/23 0102     IP VTE HIGH RISK PATIENT  Once         12/10/23 0102     Place sequential compression device  Until discontinued         12/10/23 0102                    Marisa Pizano PA-C  Cardiology  [facility]

## 2023-12-15 NOTE — ASSESSMENT & PLAN NOTE
New onset CHF with EF 40%  Needs ischemia evaluation once euvolemic  Monitor I/Os  Continue IV diuresis     12/11/23  -Still overloaded  -Continue IV diuresis  -Continue ACEi, BB  -Strict I's/O's  -Ischemic workup once volume status improved    12/12/23  -Needs continued IV diuresis  -Continue BB, ACEi  -ZOE/DCCV and ischemic workup once compensated    12/13/23  -See above, needs continued IV diuresis    12/14/23  -Continue IV diuresis     12/15/23  -Improving, continued IV diuresis

## 2023-12-15 NOTE — PT/OT/SLP PROGRESS
Physical Therapy Treatment    Patient Name:  Roland Ng   MRN:  [id number]    Recommendations:     Discharge Recommendations: Low Intensity Therapy  Discharge Equipment Recommendations: walker, rolling  Barriers to discharge: None    Assessment:     Roland Ng is a 68 y.o. male admitted with a medical diagnosis of Atrial flutter.  He presents with the following impairments/functional limitations: impaired endurance, weakness, impaired functional mobility, gait instability, impaired balance, decreased safety awareness, decreased lower extremity function, decreased coordination, impaired cardiopulmonary response to activity, edema.    Rehab Prognosis: Good; patient would benefit from acute skilled PT services to address these deficits and reach maximum level of function.    Recent Surgery: Procedure(s) (LRB):  Transesophageal echo (ZOE) intra-procedure log documentation (N/A) Day of Surgery    Plan:     During this hospitalization, patient to be seen 3 x/week to address the identified rehab impairments via gait training, therapeutic activities, therapeutic exercises and progress toward the following goals:    Plan of Care Expires:  12/25/23    Subjective     Chief Complaint: Pt is motivated to participate  Patient/Family Comments/goals: none stated  Pain/Comfort:  Pain Rating 1: 0/10      Objective:     Communicated with nurse Garnett and epic chart review prior to session.  Patient found HOB elevated with peripheral IV, telemetry upon PT entry to room.     General Precautions: Standard, fall  Orthopedic Precautions: N/A  Braces: N/A  Respiratory Status: Room air     Functional Mobility:  Gait belt applied - Yes  Bed Mobility  Rolling Left: contact guard assistance  Scooting: contact guard assistance  Supine to Sit: contact guard assistance  Transfers  Sit to Stand: contact guard assistance with rolling walker  Bed to Chair: contact guard assistance with rolling walker using Step  "Transfer  Gait  Patient ambulated 300ft with rolling walker and stand by assistance. Patient demonstrates occasional unsteady gait. No c/o dizziness or SOB, no LOB. All lines remained intact throughout ambulation trail.  Balance  Sitting: stand by assistance  Standing: stand by assistance  Pt with difficulty following commands this date needing increased verbal cuing, nurse notified    AM-PAC 6 CLICK MOBILITY  Turning over in bed (including adjusting bedclothes, sheets and blankets)?: 3  Sitting down on and standing up from a chair with arms (e.g., wheelchair, bedside commode, etc.): 3  Moving from lying on back to sitting on the side of the bed?: 3  Moving to and from a bed to a chair (including a wheelchair)?: 3  Need to walk in hospital room?: 3  Climbing 3-5 steps with a railing?: 1 (NT)  Basic Mobility Total Score: 16       Treatment & Education:  Reviewed role of PT in acute care and POC. Pt tolerated interventions well. Patient performed 1 set(s) of 10 repetitions of the following seated exercises: ankle pumps, long arc quads, and marches for bilateral LE. Patient required skilled PT for instruction of exercises and appropriate cues to perform exercises safely and appropriately. Reviewed importance of OOB activities, activity pacing, and HEP (marching/hip flex, hip abd, heel slides/LAQ, quad sets, ankle pumps) in order to maintain/regain strength. Encouraged to sit up in chair for all meals. Reviewed proper use of RW for safety and to reduce risk of falling. Reviewed "call don't fall" policy and increased risk of falling due to weakness, instructed to utilize call bell for assistance with all transfers. Pt agreeable to all requests.    Patient left up in chair with all lines intact, call button in reach, and nurse notified.    GOALS:   Multidisciplinary Problems       Physical Therapy Goals          Problem: Physical Therapy    Goal Priority Disciplines Outcome Goal Variances Interventions   Physical Therapy " Goal     PT, PT/OT Ongoing, Progressing     Description: Goals to be met by 12/25/23.  1. Pt will complete bed mobility MOD I.  2. Pt will complete sit to stand MOD I.  3. Pt will ambulate 200ft MOD I using RW.  4. Pt will increase AMPAC score by 2 points to progress functional mobility.                       Time Tracking:     PT Received On: 12/15/23  PT Start Time: 1101     PT Stop Time: 1126  PT Total Time (min): 25 min     Billable Minutes: Gait Training 15min and Therapeutic Exercise 10min    Treatment Type: Treatment  PT/PTA: PT     Number of PTA visits since last PT visit: 0     12/15/2023

## 2023-12-15 NOTE — PLAN OF CARE
Pt tolerated interventions well. Required CGA for bed mobility, ambulated 300ft SBA using RW. Recommending low intensity therapy upon d/c.

## 2023-12-15 NOTE — ASSESSMENT & PLAN NOTE
Remains in atrial flutter  Continue heparin infusion  Planned for ZOE/cardioversion once more euvolemic as per Cardiology, tentatively scheduled for tomorrow

## 2023-12-15 NOTE — ASSESSMENT & PLAN NOTE
Chronic, controlled. Latest blood pressure and vitals reviewed-     Temp:  [97.7 °F (36.5 °C)-98.9 °F (37.2 °C)]   Pulse:  [42-99]   Resp:  [18]   BP: ()/(55-63)   SpO2:  [95 %-99 %] .   Home meds for hypertension were reviewed and noted below.   Hypertension Medications               amLODIPine (NORVASC) 10 MG tablet Take 1 tablet (10 mg total) by mouth once daily.    furosemide (LASIX) 40 MG tablet Take 1 tablet (40 mg total) by mouth once daily.    lisinopriL (PRINIVIL,ZESTRIL) 20 MG tablet Take 1 tablet (20 mg total) by mouth once daily.    metoprolol succinate (TOPROL-XL) 50 MG 24 hr tablet Take 1 tablet (50 mg total) by mouth once daily.          While in the hospital, will manage blood pressure as follows; Continue home antihypertensive regimen except for p.o. Lasix given he is currently on IV Lasix    Will utilize p.r.n. blood pressure medication only if patient's blood pressure greater than 160/100 and he develops symptoms such as worsening chest pain or shortness of breath.

## 2023-12-15 NOTE — SUBJECTIVE & OBJECTIVE
Review of Systems   Constitutional: Positive for malaise/fatigue.   HENT: Negative.     Eyes: Negative.    Cardiovascular:  Positive for dyspnea on exertion, leg swelling and orthopnea.   Respiratory:  Positive for shortness of breath.    Endocrine: Negative.    Hematologic/Lymphatic: Negative.    Skin: Negative.    Musculoskeletal: Negative.    Gastrointestinal: Negative.    Genitourinary: Negative.    Neurological: Negative.    Psychiatric/Behavioral: Negative.     Allergic/Immunologic: Negative.      Objective:     Vital Signs (Most Recent):  Temp: 97.8 °F (36.6 °C) (12/15/23 1129)  Pulse: (!) 43 (12/15/23 1138)  Resp: 18 (12/15/23 1129)  BP: (!) 99/54 (12/15/23 1129)  SpO2: 96 % (12/15/23 1129) Vital Signs (24h Range):  Temp:  [97.8 °F (36.6 °C)-98.2 °F (36.8 °C)] 97.8 °F (36.6 °C)  Pulse:  [42-85] 43  Resp:  [18-19] 18  SpO2:  [93 %-97 %] 96 %  BP: ()/(52-87) 99/54     Weight: (!) 143 kg (315 lb 4.1 oz)  Body mass index is 43.97 kg/m².     SpO2: 96 %         Intake/Output Summary (Last 24 hours) at 12/15/2023 1508  Last data filed at 12/15/2023 0430  Gross per 24 hour   Intake 180 ml   Output 1825 ml   Net -1645 ml       Lines/Drains/Airways       Peripheral Intravenous Line  Duration                  Peripheral IV - Single Lumen 12/11/23 0120 20 G Anterior;Left Forearm 4 days                       Physical Exam  Vitals and nursing note reviewed.   Constitutional:       General: He is not in acute distress.     Appearance: Normal appearance. He is well-developed. He is obese. He is not diaphoretic.   HENT:      Head: Normocephalic and atraumatic.   Eyes:      General:         Right eye: No discharge.         Left eye: No discharge.      Pupils: Pupils are equal, round, and reactive to light.   Cardiovascular:      Rate and Rhythm: Normal rate. Rhythm irregularly irregular.      Heart sounds: Normal heart sounds, S1 normal and S2 normal. No murmur heard.  Pulmonary:      Effort: Pulmonary effort is  "normal. No respiratory distress.      Breath sounds: Normal breath sounds. No wheezing or rales.   Abdominal:      General: There is no distension.      Tenderness: There is no rebound.   Musculoskeletal:      Left lower leg: Edema present.   Skin:     General: Skin is warm and dry.      Findings: No erythema.   Neurological:      Mental Status: He is alert and oriented to person, place, and time.   Psychiatric:         Mood and Affect: Mood normal.         Behavior: Behavior normal.         Thought Content: Thought content normal.           Significant Labs: CMP   Recent Labs   Lab 12/14/23  0602 12/15/23  0500    139   K 3.6 3.9   CL 98 96   CO2 29 29    120*   BUN 23 27*   CREATININE 1.1 1.3   CALCIUM 9.6 9.9   ANIONGAP 12 14   , CBC   Recent Labs   Lab 12/14/23  1442 12/15/23  0500   WBC 9.99 8.22   HGB 15.8 15.7   HCT 47.4 46.6    231   , Troponin No results for input(s): "TROPONINI" in the last 48 hours., and All pertinent lab results from the last 24 hours have been reviewed.    Significant Imaging: Echocardiogram: Transthoracic echo (TTE) complete (Cupid Only):   Results for orders placed or performed during the hospital encounter of 12/09/23   Echo   Result Value Ref Range    BSA 2.62 m2    LVOT stroke volume 44.74 cm3    LVIDd 5.31 3.5 - 6.0 cm    LV Systolic Volume 81.84 mL    LV Systolic Volume Index 32.5 mL/m2    LVIDs 4.27 (A) 2.1 - 4.0 cm    LV Diastolic Volume 135.86 mL    LV Diastolic Volume Index 53.91 mL/m2    IVS 1.28 (A) 0.6 - 1.1 cm    LVOT diameter 2.07 cm    LVOT area 3.4 cm2    FS 20 (A) 28 - 44 %    Left Ventricle Relative Wall Thickness 0.49 cm    Posterior Wall 1.29 (A) 0.6 - 1.1 cm    LV mass 283.13 g    LV Mass Index 112 g/m2    MV Peak E Ishmael 1.08 m/s    TDI LATERAL 0.11 m/s    TDI SEPTAL 0.08 m/s    E/E' ratio 11.37 m/s    MV Peak A Ishmael 0.23 m/s    TR Max Ishmael 2.78 m/s    E/A ratio 4.70     IVRT 31.40 msec    E wave deceleration time 167.06 msec    LV SEPTAL E/E' " RATIO 13.50 m/s    LV LATERAL E/E' RATIO 9.82 m/s    LVOT peak ria 0.68 m/s    Left Ventricular Outflow Tract Mean Velocity 0.44 cm/s    Left Ventricular Outflow Tract Mean Gradient 0.91 mmHg    RV mid diameter 4.02 cm    RVOT peak VTI 9.8 cm    TAPSE 1.70 cm    LA size 5.35 cm    Left Atrium Major Axis 6.56 cm    RA Major Axis 5.76 cm    AV mean gradient 3 mmHg    AV peak gradient 4 mmHg    Ao peak ria 1.04 m/s    Ao VTI 17.90 cm    LVOT peak VTI 13.30 cm    AV valve area 2.50 cm²    AV Velocity Ratio 0.65     AV index (prosthetic) 0.74     MARILYN by Velocity Ratio 2.20 cm²    Mr max ria 4.52 m/s    MV stenosis pressure 1/2 time 48.45 ms    MV valve area p 1/2 method 4.54 cm2    Triscuspid Valve Regurgitation Peak Gradient 31 mmHg    PV mean gradient 1 mmHg    RVOT peak ria 0.56 m/s    Ao root annulus 3.22 cm    STJ 3.20 cm    Ascending aorta 3.27 cm    IVC diameter 1.93 cm    Mean e' 0.10 m/s    ZLVIDS -5.62     ZLVIDD -10.17     LA WIDTH 4.7 cm    RA Width 4.4 cm    EF 40 %    TV resting pulmonary artery pressure 39 mmHg    RV TB RVSP 11 mmHg    Est. RA pres 8 mmHg    Narrative      Left Ventricle: The left ventricle is normal in size. Normal wall   thickness. There is concentric remodeling. Mild global hyperkinesis   present. There is reduced systolic function. Ejection fraction by visual   approximation is 40%. There is normal diastolic function.    Right Ventricle: Moderate right ventricular enlargement. Wall thickness   is normal. Systolic function is moderately reduced.    Mitral Valve: There is mild regurgitation.    Tricuspid Valve: There is moderate regurgitation.    Pulmonary Artery: The estimated pulmonary artery systolic pressure is   39 mmHg.    IVC/SVC: Intermediate venous pressure at 8 mmHg.     , EKG: Reviewed, and X-Ray: CXR: X-Ray Chest 1 View (CXR): No results found for this visit on 12/09/23. and X-Ray Chest PA and Lateral (CXR): No results found for this visit on 12/09/23.

## 2023-12-15 NOTE — ASSESSMENT & PLAN NOTE
Patient is identified as having Diastolic (HFpEF) heart failure that is Acute on chronic. CHF is currently uncontrolled due to Continued edema of extremities and Dyspnea not returned to baseline after single doses of IV diuretic. Latest ECHO performed and demonstrates- Results for orders placed during the hospital encounter of 03/08/22    Echo    Interpretation Summary  · The left ventricle is normal in size with concentric hypertrophy and normal systolic function.  · Mild left atrial enlargement.  · Indeterminate left ventricular diastolic function.  · The estimated PA systolic pressure is 18 mmHg.  · Normal right ventricular size with normal right ventricular systolic function.  · Intermediate central venous pressure (8 mmHg).  · The estimated ejection fraction is 65%.  · Mild pulmonic regurgitation.  · Mild mitral regurgitation.  Continue Beta Blocker, ACE/ARB, and Furosemide and monitor clinical status closely. Monitor on telemetry. Patient is on CHF pathway.  Monitor strict Is&Os and daily weights.  Place on fluid restriction of 1.5 L. Cardiology has been consulted. Continue to stress to patient importance of self efficacy and  on diet for CHF. Last BNP reviewed- and noted below   Recent Labs   Lab 12/09/23  2302   *       -telemetry  -continue IV diuretics  -bipap qhs prn  -weaned down O2 as able

## 2023-12-15 NOTE — ASSESSMENT & PLAN NOTE
Multifactorial secondary to lymphedema and CHF exacerbation   Wound care consulted, recommended outpatient referral to lymphedema clinic  Improving with diuresis

## 2023-12-15 NOTE — SUBJECTIVE & OBJECTIVE
Interval History: No acute events overnight.  Seen and examined without any family present resting comfortably in bed.  Reports he is feeling well, denies any chest pain.  Shortness of breath with activities is continuing to decrease.  No new complaints. Continuing to diurese well.       Review of Systems  Objective:     Vital Signs (Most Recent):  Temp: 98.2 °F (36.8 °C) (12/14/23 1751)  Pulse: 66 (12/14/23 1751)  Resp: 18 (12/14/23 1751)  BP: (!) 124/56 (12/14/23 1751)  SpO2: 96 % (12/14/23 1751) Vital Signs (24h Range):  Temp:  [97.7 °F (36.5 °C)-98.9 °F (37.2 °C)] 98.2 °F (36.8 °C)  Pulse:  [42-99] 66  Resp:  [18] 18  SpO2:  [95 %-99 %] 96 %  BP: ()/(55-63) 124/56     Weight: (!) 143 kg (315 lb 4.1 oz)  Body mass index is 43.97 kg/m².    Intake/Output Summary (Last 24 hours) at 12/14/2023 1847  Last data filed at 12/14/2023 1300  Gross per 24 hour   Intake --   Output 3375 ml   Net -3375 ml         Physical Exam  Vitals and nursing note reviewed. Exam conducted with a chaperone present.   Constitutional:       General: He is not in acute distress.     Appearance: He is obese. He is not ill-appearing or diaphoretic.   HENT:      Head: Normocephalic and atraumatic.      Right Ear: External ear normal.      Left Ear: External ear normal.      Nose: Nose normal.      Mouth/Throat:      Mouth: Mucous membranes are moist.   Eyes:      Extraocular Movements: Extraocular movements intact.      Pupils: Pupils are equal, round, and reactive to light.   Cardiovascular:      Rate and Rhythm: Normal rate and regular rhythm.   Pulmonary:      Effort: Pulmonary effort is normal. No respiratory distress.      Breath sounds: Rales present. No wheezing.      Comments: On room air  Abdominal:      General: Bowel sounds are normal.      Palpations: Abdomen is soft.      Tenderness: There is no abdominal tenderness. There is no guarding or rebound.   Musculoskeletal:      Cervical back: Neck supple.      Right lower leg:  Pitting Edema present.      Left lower leg: Pitting Edema present.      Comments: Lymphedema noted on bilateral lower extremities.  Edema continuing to improve   Skin:     General: Skin is warm and dry.   Neurological:      General: No focal deficit present.      Mental Status: He is alert and oriented to person, place, and time.   Psychiatric:         Mood and Affect: Mood normal.             Significant Labs: All pertinent labs within the past 24 hours have been reviewed.  CBC:   Recent Labs   Lab 12/14/23  1442   WBC 9.99   HGB 15.8   HCT 47.4        CMP:   Recent Labs   Lab 12/12/23  2300 12/13/23  0548 12/14/23  0602    141 139   K 3.6 3.8 3.6    101 98   CO2 28 29 29   * 122* 109   BUN 19 18 23   CREATININE 1.2 1.0 1.1   CALCIUM 9.4 9.3 9.6   ANIONGAP 12 11 12     Magnesium:   Recent Labs   Lab 12/12/23  2300 12/14/23  0602   MG 2.3 2.0       Significant Imaging: I have reviewed all pertinent imaging results/findings within the past 24 hours.

## 2023-12-15 NOTE — PROGRESS NOTES
[facility]  Progress Note    Patient Name: Roland Ng  MRN: [id number]  Patient Class: IP- Inpatient   Admission Date: 12/9/2023  Length of Stay: 5 days  Attending Physician: Solange Blanchard DO  Primary Care Provider: Janis Green NP        Subjective:     Principal Problem:Atrial flutter        HPI:  Roland Ng is a 68 y.o. male with a PMH  has a past medical history of CAD (coronary artery disease), Chronic congestive heart failure (01/13/2021), Diabetes mellitus with no complication, Hypertension associated with diabetes, Hyperthyroidism (01/13/2021), Morbid obesity with body mass index (BMI) of 40.0 to 44.9 in adult, and Paroxysmal A-fib. who presented to the ED for further evaluation of progressively worsening dyspnea/shortness a breath was well as bilateral lower extremity, scrotal, and penile swelling/edema.  Patient reports significant history of heart failure and was previously followed by Dr. Decker but stated he is not seen him in quite some time.  Patient also reported being off his home medications for approximately 3-4 months prior to getting back on them due to insurance related issues and has been compliant over the past 3 months.  Patient also reports non adherence to fluid/dietary restrictions and states he drinks approximately 20 bottles of water daily.  He reported no known alleviating or aggravating factors noted and reported associated symptoms included dyspnea both at rest and on exertion, orthopnea, PND, and wheezing in addition to reported swelling as noted above.  Patient reports negative history of asthma/COPD, does not use home oxygen, and does not use home CPAP.  All other review of systems negative except as noted above.  Initial workup in the ED concerning for signs/symptoms of acute CHF exacerbation with patient being admitted to Hospital Medicine inpatient for continued medical management.      PCP: Janis Green  N.      Overview/Hospital Course:  Cardiology consulted.  Patient currently being treated for CHF exacerbation with IV diuretics.  Given persistent atrial flutter, planned for DANIELLE/cardioversion once euvolemic.    PT/OT recommends moderate intensity therapy on discharge. SW consulted for SNF placement.  Has been accepted at [facility], insurance authorization obtained.    Tentatively planned for danielle/cardioversion on Monday 12/18/2023    Interval History: No acute events overnight.  Seen and examined without any family present.  Reports he feels well, shortness of breath with activity continuing to decrease.  Denies any other complaints at this time including chest pain, palpitation, nausea.  Initially planned for danielle/cardioversion this morning, however this was deferred as Cardiology would prefer to optimize him more, tentatively planned for procedure to be done on Monday.    Reviewed patient vitals, he was recorded to have multiple instances of heart rate in the 40s.  However telemetry review did not show any bradycardia, patient's heart rate has been staying mostly in the 80s and occasionally goes up over 100s for short period of time.      Review of Systems  Objective:     Vital Signs (Most Recent):  Temp: 97.8 °F (36.6 °C) (12/15/23 1129)  Pulse: 80 (12/15/23 1523)  Resp: 18 (12/15/23 1129)  BP: (!) 99/54 (12/15/23 1129)  SpO2: 96 % (12/15/23 1129) Vital Signs (24h Range):  Temp:  [97.8 °F (36.6 °C)-98.2 °F (36.8 °C)] 97.8 °F (36.6 °C)  Pulse:  [42-85] 43  Resp:  [18-19] 18  SpO2:  [93 %-97 %] 96 %  BP: ()/(52-87) 99/54     Weight: (!) 143 kg (315 lb 4.1 oz)  Body mass index is 43.97 kg/m².    Intake/Output Summary (Last 24 hours) at 12/15/2023 1521  Last data filed at 12/15/2023 0430  Gross per 24 hour   Intake 180 ml   Output 1825 ml   Net -1645 ml         Physical Exam  Vitals and nursing note reviewed.   Constitutional:       General: He is not in acute distress.     Appearance: He is obese. He is  not ill-appearing or diaphoretic.   HENT:      Head: Normocephalic and atraumatic.      Right Ear: External ear normal.      Left Ear: External ear normal.      Nose: Nose normal.      Mouth/Throat:      Mouth: Mucous membranes are moist.   Eyes:      Extraocular Movements: Extraocular movements intact.      Pupils: Pupils are equal, round, and reactive to light.   Cardiovascular:      Rate and Rhythm: Normal rate and regular rhythm.   Pulmonary:      Effort: Pulmonary effort is normal. No respiratory distress.      Breath sounds: No wheezing or rales.      Comments: On room air  Abdominal:      General: Bowel sounds are normal.      Palpations: Abdomen is soft.      Tenderness: There is no abdominal tenderness. There is no guarding or rebound.   Musculoskeletal:      Cervical back: Neck supple.      Right lower leg: Pitting Edema present.      Left lower leg: Pitting Edema present.      Comments: Lymphedema noted on bilateral lower extremities.  Edema continuing to improve   Skin:     General: Skin is warm and dry.   Neurological:      General: No focal deficit present.      Mental Status: He is alert and oriented to person, place, and time.   Psychiatric:         Mood and Affect: Mood normal.             Significant Labs: All pertinent labs within the past 24 hours have been reviewed.  CBC:   Recent Labs   Lab 12/14/23  1442 12/15/23  0500   WBC 9.99 8.22   HGB 15.8 15.7   HCT 47.4 46.6    231     CMP:   Recent Labs   Lab 12/14/23  0602 12/15/23  0500    139   K 3.6 3.9   CL 98 96   CO2 29 29    120*   BUN 23 27*   CREATININE 1.1 1.3   CALCIUM 9.6 9.9   ANIONGAP 12 14       Significant Imaging: I have reviewed all pertinent imaging results/findings within the past 24 hours.    Assessment/Plan:      * Atrial flutter  Remains in atrial flutter  Continue heparin infusion  Planned for ZOE/cardioversion once more euvolemic as per Cardiology, tentatively scheduled for Monday      Scrotal  swelling  Ultrasound showed large bilateral hydrocele with extensive scrotal wall thickening or edema, no abscess   Likely secondary to excessive volume in setting of CHF exacerbation      Lower extremity edema  Multifactorial secondary to lymphedema and CHF exacerbation   Wound care consulted, recommended outpatient referral to lymphedema clinic  Improving with diuresis      SOB (shortness of breath)  In setting of CHF exacerbation  Improving although continues to still have dyspnea with exertion       Paroxysmal A-fib  Patient with Paroxysmal (<7 days) atrial fibrillation which is uncontrolled currently with Beta Blocker and Calcium Channel Blocker. Patient is currently in atrial fibrillation.[id number] Score: 3. Anticoagulation indicated. Anticoagulation done with Eliquis .      Atherosclerosis of native coronary artery of native heart without angina pectoris  Patient with known CAD s/p  LHC without stent placement , which is controlled Will continue  Eliquis, ASA, and Statin and monitor for S/Sx of angina/ACS. Continue to monitor on telemetry.       Morbid obesity with body mass index (BMI) of 40.0 to 44.9 in adult  Body mass index is 43.97 kg/m². Morbid obesity complicates all aspects of disease management from diagnostic modalities to treatment. Weight loss encouraged and health benefits explained to patient.       Hypertension  Chronic, controlled. Latest blood pressure and vitals reviewed-     Temp:  [97.8 °F (36.6 °C)-98.2 °F (36.8 °C)]   Pulse:  [42-85]   Resp:  [18-19]   BP: ()/(52-87)   SpO2:  [93 %-97 %] .   Home meds for hypertension were reviewed and noted below.   Hypertension Medications               amLODIPine (NORVASC) 10 MG tablet Take 1 tablet (10 mg total) by mouth once daily.    furosemide (LASIX) 40 MG tablet Take 1 tablet (40 mg total) by mouth once daily.    lisinopriL (PRINIVIL,ZESTRIL) 20 MG tablet Take 1 tablet (20 mg total) by mouth once daily.    metoprolol succinate (TOPROL-XL)  50 MG 24 hr tablet Take 1 tablet (50 mg total) by mouth once daily.          While in the hospital, will manage blood pressure as follows; Continue home antihypertensive regimen except for p.o. Lasix given he is currently on IV Lasix    Will utilize p.r.n. blood pressure medication only if patient's blood pressure greater than 160/100 and he develops symptoms such as worsening chest pain or shortness of breath.      Acute exacerbation of CHF (congestive heart failure)  Patient is identified as having Diastolic (HFpEF) heart failure that is Acute on chronic. CHF is currently uncontrolled due to Continued edema of extremities and Dyspnea not returned to baseline after single doses of IV diuretic. Latest ECHO performed and demonstrates- Results for orders placed during the hospital encounter of 03/08/22    Echo    Interpretation Summary  · The left ventricle is normal in size with concentric hypertrophy and normal systolic function.  · Mild left atrial enlargement.  · Indeterminate left ventricular diastolic function.  · The estimated PA systolic pressure is 18 mmHg.  · Normal right ventricular size with normal right ventricular systolic function.  · Intermediate central venous pressure (8 mmHg).  · The estimated ejection fraction is 65%.  · Mild pulmonic regurgitation.  · Mild mitral regurgitation.  Continue Beta Blocker, ACE/ARB, and Furosemide and monitor clinical status closely. Monitor on telemetry. Patient is on CHF pathway.  Monitor strict Is&Os and daily weights.  Place on fluid restriction of 1.5 L. Cardiology has been consulted. Continue to stress to patient importance of self efficacy and  on diet for CHF. Last BNP reviewed- and noted below   Recent Labs   Lab 12/09/23  2302   *       -telemetry  -continue IV diuretics  -bipap qhs prn  -weaned down O2 as able      Hyperthyroidism  Patient has chronic hyperthyroidism. TFTs reviewed-   Lab Results   Component Value Date    TSH 0.083 (L)  12/10/2023     Although TSH is slightly low, free T4 is within normal limits  Will continue home methimazole  Outpatient follow-up for further monitoring and medication titration as needed      VTE Risk Mitigation (From admission, onward)           Ordered     heparin 25,000 units in dextrose 5% 250 mL (100 units/mL) infusion LOW INTENSITY nomogram - [facility]  Continuous        Question:  Begin at (units/kg/hr)  Answer:  12    12/10/23 1752     heparin 25,000 units in dextrose 5% (100 units/ml) IV bolus from bag - ADDITIONAL PRN BOLUS - 60 units/kg  As needed (PRN)        Question:  Heparin Infusion Adjustment (DO NOT MODIFY ANSWER)  Answer:  [email or web address]\Images\Pharmacy\HeparinInfusions\heparin LOW INTENSITY nomogram for [facility] [id number]    12/10/23 1752     heparin 25,000 units in dextrose 5% (100 units/ml) IV bolus from bag - ADDITIONAL PRN BOLUS - 30 units/kg  As needed (PRN)        Question:  Heparin Infusion Adjustment (DO NOT MODIFY ANSWER)  Answer:  [email or web address]\Images\Pharmacy\HeparinInfusions\heparin LOW INTENSITY nomogram for [facility] [id number]    12/10/23 1752     Reason for No Pharmacological VTE Prophylaxis  Once        Question:  Reasons:  Answer:  Already adequately anticoagulated on oral Anticoagulants    12/10/23 0102     IP VTE HIGH RISK PATIENT  Once         12/10/23 0102     Place sequential compression device  Until discontinued         12/10/23 0102                    Discharge Planning   RABIA:      Code Status: Full Code   Is the patient medically ready for discharge?:     Reason for patient still in hospital (select all that apply): Patient trending condition, Treatment, and Consult recommendations  Discharge Plan A: Skilled Nursing Facility                  Solange Blanchard DO  Department of Hospital Medicine   [facility] - Med Surg

## 2023-12-15 NOTE — ASSESSMENT & PLAN NOTE
Remains in atrial flutter  Continue heparin infusion  Planned for ZOE/cardioversion once more euvolemic as per Cardiology, tentatively scheduled for Monday

## 2023-12-16 LAB
ANION GAP SERPL CALC-SCNC: 13 MMOL/L (ref 8–16)
APTT PPP: 47.8 SEC (ref 21–32)
BASOPHILS # BLD AUTO: 0.02 K/UL (ref 0–0.2)
BASOPHILS NFR BLD: 0.2 % (ref 0–1.9)
BUN SERPL-MCNC: 33 MG/DL (ref 8–23)
CALCIUM SERPL-MCNC: 9.3 MG/DL (ref 8.7–10.5)
CHLORIDE SERPL-SCNC: 95 MMOL/L (ref 95–110)
CO2 SERPL-SCNC: 30 MMOL/L (ref 23–29)
CREAT SERPL-MCNC: 1.6 MG/DL (ref 0.5–1.4)
DIFFERENTIAL METHOD BLD: ABNORMAL
EOSINOPHIL # BLD AUTO: 0.2 K/UL (ref 0–0.5)
EOSINOPHIL NFR BLD: 1.8 % (ref 0–8)
ERYTHROCYTE [DISTWIDTH] IN BLOOD BY AUTOMATED COUNT: 15.4 % (ref 11.5–14.5)
EST. GFR  (NO RACE VARIABLE): 47 ML/MIN/1.73 M^2
GLUCOSE SERPL-MCNC: 128 MG/DL (ref 70–110)
HCT VFR BLD AUTO: 46.1 % (ref 40–54)
HGB BLD-MCNC: 15.3 G/DL (ref 14–18)
IMM GRANULOCYTES # BLD AUTO: 0.03 K/UL (ref 0–0.04)
IMM GRANULOCYTES NFR BLD AUTO: 0.4 % (ref 0–0.5)
LYMPHOCYTES # BLD AUTO: 2.6 K/UL (ref 1–4.8)
LYMPHOCYTES NFR BLD: 30.4 % (ref 18–48)
MAGNESIUM SERPL-MCNC: 2.1 MG/DL (ref 1.6–2.6)
MCH RBC QN AUTO: 25.4 PG (ref 27–31)
MCHC RBC AUTO-ENTMCNC: 33.2 G/DL (ref 32–36)
MCV RBC AUTO: 77 FL (ref 82–98)
MONOCYTES # BLD AUTO: 0.8 K/UL (ref 0.3–1)
MONOCYTES NFR BLD: 10 % (ref 4–15)
NEUTROPHILS # BLD AUTO: 4.8 K/UL (ref 1.8–7.7)
NEUTROPHILS NFR BLD: 57.2 % (ref 38–73)
NRBC BLD-RTO: 0 /100 WBC
PLATELET # BLD AUTO: 216 K/UL (ref 150–450)
PMV BLD AUTO: 12.2 FL (ref 9.2–12.9)
POTASSIUM SERPL-SCNC: 3.3 MMOL/L (ref 3.5–5.1)
RBC # BLD AUTO: 6.02 M/UL (ref 4.6–6.2)
SODIUM SERPL-SCNC: 138 MMOL/L (ref 136–145)
TROPONIN I SERPL DL<=0.01 NG/ML-MCNC: 0.01 NG/ML (ref 0–0.03)
TROPONIN I SERPL DL<=0.01 NG/ML-MCNC: <0.006 NG/ML (ref 0–0.03)
WBC # BLD AUTO: 8.39 K/UL (ref 3.9–12.7)

## 2023-12-16 PROCEDURE — 85025 COMPLETE CBC W/AUTO DIFF WBC: CPT | Performed by: INTERNAL MEDICINE

## 2023-12-16 PROCEDURE — 36415 COLL VENOUS BLD VENIPUNCTURE: CPT | Performed by: INTERNAL MEDICINE

## 2023-12-16 PROCEDURE — 36415 COLL VENOUS BLD VENIPUNCTURE: CPT | Performed by: HOSPITALIST

## 2023-12-16 PROCEDURE — 25000003 PHARM REV CODE 250: Performed by: STUDENT IN AN ORGANIZED HEALTH CARE EDUCATION/TRAINING PROGRAM

## 2023-12-16 PROCEDURE — 25000003 PHARM REV CODE 250: Performed by: INTERNAL MEDICINE

## 2023-12-16 PROCEDURE — 84484 ASSAY OF TROPONIN QUANT: CPT | Mod: 91 | Performed by: STUDENT IN AN ORGANIZED HEALTH CARE EDUCATION/TRAINING PROGRAM

## 2023-12-16 PROCEDURE — 21400001 HC TELEMETRY ROOM

## 2023-12-16 PROCEDURE — 99233 SBSQ HOSP IP/OBS HIGH 50: CPT | Mod: ,,, | Performed by: INTERNAL MEDICINE

## 2023-12-16 PROCEDURE — 93010 ELECTROCARDIOGRAM REPORT: CPT | Mod: ,,, | Performed by: INTERNAL MEDICINE

## 2023-12-16 PROCEDURE — 63600175 PHARM REV CODE 636 W HCPCS: Performed by: STUDENT IN AN ORGANIZED HEALTH CARE EDUCATION/TRAINING PROGRAM

## 2023-12-16 PROCEDURE — 83735 ASSAY OF MAGNESIUM: CPT | Performed by: INTERNAL MEDICINE

## 2023-12-16 PROCEDURE — 85730 THROMBOPLASTIN TIME PARTIAL: CPT | Performed by: INTERNAL MEDICINE

## 2023-12-16 PROCEDURE — 80048 BASIC METABOLIC PNL TOTAL CA: CPT | Performed by: HOSPITALIST

## 2023-12-16 PROCEDURE — 93005 ELECTROCARDIOGRAM TRACING: CPT

## 2023-12-16 PROCEDURE — 25000003 PHARM REV CODE 250: Performed by: HOSPITALIST

## 2023-12-16 RX ORDER — POTASSIUM CHLORIDE 20 MEQ/1
40 TABLET, EXTENDED RELEASE ORAL ONCE
Status: COMPLETED | OUTPATIENT
Start: 2023-12-16 | End: 2023-12-16

## 2023-12-16 RX ADMIN — METOPROLOL SUCCINATE 50 MG: 50 TABLET, EXTENDED RELEASE ORAL at 09:12

## 2023-12-16 RX ADMIN — HEPARIN SODIUM 19 UNITS/KG/HR: 10000 INJECTION, SOLUTION INTRAVENOUS at 11:12

## 2023-12-16 RX ADMIN — MAGNESIUM OXIDE TAB 400 MG (241.3 MG ELEMENTAL MG) 400 MG: 400 (241.3 MG) TAB at 09:12

## 2023-12-16 RX ADMIN — LISINOPRIL 20 MG: 20 TABLET ORAL at 09:12

## 2023-12-16 RX ADMIN — SENNOSIDES AND DOCUSATE SODIUM 2 TABLET: 8.6; 5 TABLET ORAL at 09:12

## 2023-12-16 RX ADMIN — POTASSIUM CHLORIDE 40 MEQ: 1500 TABLET, EXTENDED RELEASE ORAL at 09:12

## 2023-12-16 RX ADMIN — CETIRIZINE HYDROCHLORIDE 10 MG: 10 TABLET, FILM COATED ORAL at 09:12

## 2023-12-16 RX ADMIN — METHIMAZOLE 5 MG: 5 TABLET ORAL at 09:12

## 2023-12-16 RX ADMIN — ASPIRIN 81 MG CHEWABLE TABLET 81 MG: 81 TABLET CHEWABLE at 09:12

## 2023-12-16 RX ADMIN — HEPARIN SODIUM 19 UNITS/KG/HR: 10000 INJECTION, SOLUTION INTRAVENOUS at 07:12

## 2023-12-16 NOTE — ASSESSMENT & PLAN NOTE
Chronic, controlled. Latest blood pressure and vitals reviewed-     Temp:  [97.5 °F (36.4 °C)-98.4 °F (36.9 °C)]   Pulse:  [40-92]   Resp:  [16-20]   BP: (102-137)/(53-71)   SpO2:  [93 %-98 %] .   Home meds for hypertension were reviewed and noted below.   Hypertension Medications               amLODIPine (NORVASC) 10 MG tablet Take 1 tablet (10 mg total) by mouth once daily.    furosemide (LASIX) 40 MG tablet Take 1 tablet (40 mg total) by mouth once daily.    lisinopriL (PRINIVIL,ZESTRIL) 20 MG tablet Take 1 tablet (20 mg total) by mouth once daily.    metoprolol succinate (TOPROL-XL) 50 MG 24 hr tablet Take 1 tablet (50 mg total) by mouth once daily.          While in the hospital, will manage blood pressure as follows; Continue home antihypertensive regimen     Will utilize p.r.n. blood pressure medication only if patient's blood pressure greater than 160/100 and he develops symptoms such as worsening chest pain or shortness of breath.

## 2023-12-16 NOTE — ASSESSMENT & PLAN NOTE
Remains in atrial flutter, rate controlled with Toprol  Continue heparin infusion  Planned for ZOE/cardioversion once more euvolemic as per Cardiology, tentatively scheduled for Monday

## 2023-12-16 NOTE — PROGRESS NOTES
[facility] Medicine  Progress Note    Patient Name: Roland Ng  MRN: [id number]  Patient Class: IP- Inpatient   Admission Date: 12/9/2023  Length of Stay: 6 days  Attending Physician: Solange Blanchard DO  Primary Care Provider: Janis Green NP        Subjective:     Principal Problem:Atrial flutter        HPI:  Roland Ng is a 68 y.o. male with a PMH  has a past medical history of CAD (coronary artery disease), Chronic congestive heart failure (01/13/2021), Diabetes mellitus with no complication, Hypertension associated with diabetes, Hyperthyroidism (01/13/2021), Morbid obesity with body mass index (BMI) of 40.0 to 44.9 in adult, and Paroxysmal A-fib. who presented to the ED for further evaluation of progressively worsening dyspnea/shortness a breath was well as bilateral lower extremity, scrotal, and penile swelling/edema.  Patient reports significant history of heart failure and was previously followed by Dr. Decker but stated he is not seen him in quite some time.  Patient also reported being off his home medications for approximately 3-4 months prior to getting back on them due to insurance related issues and has been compliant over the past 3 months.  Patient also reports non adherence to fluid/dietary restrictions and states he drinks approximately 20 bottles of water daily.  He reported no known alleviating or aggravating factors noted and reported associated symptoms included dyspnea both at rest and on exertion, orthopnea, PND, and wheezing in addition to reported swelling as noted above.  Patient reports negative history of asthma/COPD, does not use home oxygen, and does not use home CPAP.  All other review of systems negative except as noted above.  Initial workup in the ED concerning for signs/symptoms of acute CHF exacerbation with patient being admitted to Hospital Medicine inpatient for continued medical management.      PCP: Janis Green  N.      Overview/Hospital Course:  Cardiology consulted.  Patient currently being treated for CHF exacerbation with IV diuretics.  Given persistent atrial flutter, planned for DANIELLE/cardioversion once euvolemic.    PT/OT recommends moderate intensity therapy on discharge. SW consulted for SNF placement.  Has been accepted at [facility], insurance authorization obtained.    Tentatively planned for danielle/cardioversion on Monday 12/18/2023.    Of note, patient recorded to have multiple instances of heart rate in the 40s on EMR.  However telemetry review did not show any bradycardia, patient's heart rate has been staying mostly in the 80s and occasionally goes up over 100s for short period of time.      Interval History: No acute events overnight.  Seen and examined without any family present.  Reports he feels well.  Denies any complaint, has not had any further dyspnea with exertion.  IV Lasix discontinued as creatinine bumped up to 1.6 today, discussed with cardiology about restarting home dose Lasix tomorrow.      Review of Systems  Objective:     Vital Signs (Most Recent):  Temp: 97.5 °F (36.4 °C) (12/16/23 1133)  Pulse: (!) 41 (12/16/23 1133)  Resp: 16 (12/16/23 1133)  BP: (!) 116/54 (12/16/23 1133)  SpO2: 96 % (12/16/23 1133) Vital Signs (24h Range):  Temp:  [97.5 °F (36.4 °C)-98.4 °F (36.9 °C)] 97.5 °F (36.4 °C)  Pulse:  [40-92] 41  Resp:  [16-20] 16  SpO2:  [93 %-98 %] 96 %  BP: (102-137)/(53-71) 116/54     Weight: 135.6 kg (298 lb 15.1 oz)  Body mass index is 41.69 kg/m².    Intake/Output Summary (Last 24 hours) at 12/16/2023 1707  Last data filed at 12/16/2023 1230  Gross per 24 hour   Intake 720 ml   Output 600 ml   Net 120 ml         Physical Exam  Vitals and nursing note reviewed.   Constitutional:       General: He is not in acute distress.     Appearance: He is obese. He is not ill-appearing or diaphoretic.   HENT:      Head: Normocephalic and atraumatic.      Right Ear: External ear normal.      Left  Ear: External ear normal.      Nose: Nose normal.      Mouth/Throat:      Mouth: Mucous membranes are moist.   Eyes:      Extraocular Movements: Extraocular movements intact.      Pupils: Pupils are equal, round, and reactive to light.   Cardiovascular:      Rate and Rhythm: Normal rate and regular rhythm.   Pulmonary:      Effort: Pulmonary effort is normal. No respiratory distress.      Breath sounds: No wheezing or rales.      Comments: On room air  Abdominal:      General: Bowel sounds are normal.      Palpations: Abdomen is soft.      Tenderness: There is no abdominal tenderness. There is no guarding or rebound.   Musculoskeletal:      Cervical back: Neck supple.      Right lower leg: Pitting Edema present.      Left lower leg: Pitting Edema present.      Comments: Lymphedema noted on bilateral lower extremities.  Minimal edema on bilateral lower extremities significant wrinkling of lower extremities noted   Skin:     General: Skin is warm and dry.   Neurological:      General: No focal deficit present.      Mental Status: He is alert and oriented to person, place, and time.   Psychiatric:         Mood and Affect: Mood normal.             Significant Labs: All pertinent labs within the past 24 hours have been reviewed.  CBC:   Recent Labs   Lab 12/15/23  0500 12/16/23  0618   WBC 8.22 8.39   HGB 15.7 15.3   HCT 46.6 46.1    216     CMP:   Recent Labs   Lab 12/15/23  0500 12/16/23  0618    138   K 3.9 3.3*   CL 96 95   CO2 29 30*   * 128*   BUN 27* 33*   CREATININE 1.3 1.6*   CALCIUM 9.9 9.3   ANIONGAP 14 13     Magnesium:   Recent Labs   Lab 12/15/23  0500 12/16/23  0618   MG 2.0 2.1       Significant Imaging: I have reviewed all pertinent imaging results/findings within the past 24 hours.    Assessment/Plan:      * Atrial flutter  Remains in atrial flutter, rate controlled with Toprol  Continue heparin infusion  Planned for ZOE/cardioversion once more euvolemic as per Cardiology,  tentatively scheduled for Monday      Lymphedema  Referral to Lymphedema clinic outpatient      Scrotal swelling  Ultrasound showed large bilateral hydrocele with extensive scrotal wall thickening or edema, no abscess   Likely secondary to excessive volume in setting of CHF exacerbation      Lower extremity edema  Multifactorial secondary to lymphedema and CHF exacerbation   Wound care consulted, recommended outpatient referral to lymphedema clinic  Improved with diuresis      SOB (shortness of breath)  In setting of CHF exacerbation  Resolved    Paroxysmal A-fib  Patient with Paroxysmal (<7 days) atrial fibrillation which is uncontrolled currently with Beta Blocker and Calcium Channel Blocker. Patient is currently in atrial fibrillation.[id number] Score: 3. Anticoagulation indicated. Anticoagulation done with Eliquis .      Atherosclerosis of native coronary artery of native heart without angina pectoris  Patient with known CAD s/p  LHC without stent placement , which is controlled Will continue  Eliquis, ASA, and Statin and monitor for S/Sx of angina/ACS. Continue to monitor on telemetry.       Morbid obesity with body mass index (BMI) of 40.0 to 44.9 in adult  Body mass index is 41.69 kg/m². Morbid obesity complicates all aspects of disease management from diagnostic modalities to treatment. Weight loss encouraged and health benefits explained to patient.       Hypertension  Chronic, controlled. Latest blood pressure and vitals reviewed-     Temp:  [97.5 °F (36.4 °C)-98.4 °F (36.9 °C)]   Pulse:  [40-92]   Resp:  [16-20]   BP: (102-137)/(53-71)   SpO2:  [93 %-98 %] .   Home meds for hypertension were reviewed and noted below.   Hypertension Medications               amLODIPine (NORVASC) 10 MG tablet Take 1 tablet (10 mg total) by mouth once daily.    furosemide (LASIX) 40 MG tablet Take 1 tablet (40 mg total) by mouth once daily.    lisinopriL (PRINIVIL,ZESTRIL) 20 MG tablet Take 1 tablet (20 mg total) by mouth  once daily.    metoprolol succinate (TOPROL-XL) 50 MG 24 hr tablet Take 1 tablet (50 mg total) by mouth once daily.          While in the hospital, will manage blood pressure as follows; Continue home antihypertensive regimen     Will utilize p.r.n. blood pressure medication only if patient's blood pressure greater than 160/100 and he develops symptoms such as worsening chest pain or shortness of breath.      Acute exacerbation of CHF (congestive heart failure)  Patient is identified as having Diastolic (HFpEF) heart failure that is Acute on chronic. CHF is currently uncontrolled due to Continued edema of extremities and Dyspnea not returned to baseline after single doses of IV diuretic. Latest ECHO performed and demonstrates- Results for orders placed during the hospital encounter of 12/10/23    Echo    Interpretation Summary    Left Ventricle: The left ventricle is normal in size. Normal wall thickness. There is concentric remodeling. Mild global hyperkinesis present. There is reduced systolic function. Ejection fraction by visual approximation is 40%. There is normal diastolic function.    Right Ventricle: Moderate right ventricular enlargement. Wall thickness is normal. Systolic function is moderately reduced.    Mitral Valve: There is mild regurgitation.    Tricuspid Valve: There is moderate regurgitation.    Pulmonary Artery: The estimated pulmonary artery systolic pressure is 39 mmHg.    IVC/SVC: Intermediate venous pressure at 8 mmHg.  Continue Beta Blocker, ACE/ARB, and Furosemide and monitor clinical status closely. Monitor on telemetry. Patient is on CHF pathway.  Monitor strict Is&Os and daily weights.  Place on fluid restriction of 1.5 L. Cardiology has been consulted. Continue to stress to patient importance of self efficacy and  on diet for CHF. Last BNP reviewed- and noted below   Recent Labs   Lab 12/09/23  2302   *       -telemetry  -IV diuretics discontinued  -bipap qhs  prn      Hyperthyroidism  Patient has chronic hyperthyroidism. TFTs reviewed-   Lab Results   Component Value Date    TSH 0.083 (L) 12/10/2023     Although TSH is slightly low, free T4 is within normal limits  Will continue home methimazole  Outpatient follow-up for further monitoring and medication titration as needed      VTE Risk Mitigation (From admission, onward)           Ordered     heparin 25,000 units in dextrose 5% 250 mL (100 units/mL) infusion LOW INTENSITY nomogram - [facility]  Continuous        Question:  Begin at (units/kg/hr)  Answer:  12    12/10/23 1752     heparin 25,000 units in dextrose 5% (100 units/ml) IV bolus from bag - ADDITIONAL PRN BOLUS - 60 units/kg  As needed (PRN)        Question:  Heparin Infusion Adjustment (DO NOT MODIFY ANSWER)  Answer:  \\[facility]\epic\Images\Pharmacy\HeparinInfusions\heparin LOW INTENSITY nomogram for [facility] [id number].pdf    12/10/23 1752     heparin 25,000 units in dextrose 5% (100 units/ml) IV bolus from bag - ADDITIONAL PRN BOLUS - 30 units/kg  As needed (PRN)        Question:  Heparin Infusion Adjustment (DO NOT MODIFY ANSWER)  Answer:  \\[facility]\epic\Images\Pharmacy\HeparinInfusions\heparin LOW INTENSITY nomogram for [facility] [id number].pdf    12/10/23 1752     Reason for No Pharmacological VTE Prophylaxis  Once        Question:  Reasons:  Answer:  Already adequately anticoagulated on oral Anticoagulants    12/10/23 0102     IP VTE HIGH RISK PATIENT  Once         12/10/23 0102     Place sequential compression device  Until discontinued         12/10/23 0102                    Discharge Planning   RABIA:      Code Status: Full Code   Is the patient medically ready for discharge?:     Reason for patient still in hospital (select all that apply): Patient trending condition, Treatment, and Consult recommendations  Discharge Plan A: Skilled Nursing Facility                  Solange Blanchard DO  Department of Hospital Medicine   [facility]

## 2023-12-16 NOTE — PROGRESS NOTES
[facility]  Cardiology  Progress Note    Patient Name: Roland Ng  MRN: [id number]  Admission Date: 12/9/2023  Hospital Length of Stay: 6 days  Code Status: Full Code   Attending Physician: Solange Blanchard DO   Primary Care Physician: Janis Green NP  Expected Discharge Date:   Principal Problem:Atrial flutter    Subjective:   HPI:  68 year old  male with PMHX of CHF and atrial fibrillation presented  to ED with worsening constant SOB x 1 month. Pt  reports sleeping with four pillows and waking up with SOB in the night.  Associated sxs include  intermittent sharp chest pain,  dizziness,  abdominal swelling and BLE  swelling. Pt states he is on a fluid  pill he takes BID since BLE  swelling and  venous stasis onset in 2016. Pt states abdominal swelling is new. Pt was seen in [facility]  ED on 12/523 for SOB and was instructed  to double Lasix dose. Noted HR  was in 130s in aflutter on 12/5, was seen at [facility].       Troponin was 0.031 yesterday. . Potassium  3.4. LFTs are elevated. LST 72/ WBC is 13.   EKG shows atrial flutter with variable AV block, Right bundle branch block, Septal infarct.   EF down to 40%, prior was 60% in March 2022    Hospital Course:   12/11/23-Patient seen and examined today, sitting up in bed. Feels ok. Remains SOB, needs continued IV diuresis. HR controlled. Labs reviewed.     12/12/23-Patient seen and examined today, sitting up in bed. Diuresing well. SOB improving but still significantly overloaded. HR variable. Labs reviewed. Creatinine 1.0, K 3.4. Needs ZOE/DCCV once compensated.    12/13/23-Patient seen and examined today, sitting up in bedside chair. Diuresing well. States he was able to ambulate down the hallway today. Still overloaded, continue diuretics. Labs reviewed/stable.    12/14/23-Patient seen and examined today, resting in bed. Feels ok. Diuresing well. SOB/edema improving. Creatinine stable. Plans for ZOE/DCCV once compensated.     12/15/23-Patient seen  and examined today, sitting up in bedside chair. Continues to improve. Starting to get wrinkles in BLE. SOB better. Creatinine 1.3, BUN up-trending.  12/16/23  Diuresed net negative 14L so far.   Vitals are stable.   Labs with decreased potassium.  BNP and creatinine slightly elevated, possibly due to being euvolemic.   We will DC IV lasix and metolazone.   Likely will need stable for ZOE cardioversion on Monday or Tuesday.     ROS  Objective:      Vital Signs (Most Recent):  Temp: 98 °F (36.7 °C) (12/16/23 0732)  Pulse: 81 (12/16/23 0732)  Resp: 16 (12/16/23 0732)  BP: (!) 112/58 (12/16/23 0732)  SpO2: (!) 93 % (12/16/23 0732) Vital Signs (24h Range):  Temp:  [97.5 °F (36.4 °C)-98.4 °F (36.9 °C)] 98 °F (36.7 °C)  Pulse:  [40-92] 81  Resp:  [16-20] 16  SpO2:  [93 %-98 %] 93 %  BP: ()/(53-71) 112/58      Weight: 135.6 kg (298 lb 15.1 oz)  Body mass index is 41.69 kg/m².     SpO2: (!) 93 %        Intake/Output Summary (Last 24 hours) at 12/16/2023 0926  Last data filed at 12/16/2023 0830      Gross per 24 hour   Intake 240 ml   Output 600 ml   Net -360 ml         Lines/Drains/Airways         Peripheral Intravenous Line  Duration                     Midline Catheter Insertion/Assessment  - Single Lumen 12/15/23 2231 Left basilic vein (medial side of arm) other (see comments) <1 day                          Physical Exam  Assessment and Plan:   Patient who presents with atrial flutter/decompensated CHF. Improving, continue IV diuresis. ZOE/DCCV Next week.    * Atrial flutter  Rate controlled with toprol xl 50 bid  Once euvolemic, if still symptomatic with SOB, will consider ZOE/Cardioversion vs EP evaluation for flutter/fib ablation    12/11/23  -Continue Toprol XL  -Continue heparin gtt  -Keep NPO after MN for possible ZOE/DCCV tmw    12/12/23  -Still volume overloaded, continue IV diuresis  -Continue BB, heparin gtt  -ZOE/DCCV once compensated    12/13/23  -See above    12/16/23  -Stop IV lasix and  metolazone  -Monitor volume status  Lower extremity edema  -Assess response to IV diuresis  -CVI also contributing    Paroxysmal A-fib  -Continue anticoagulation and toprol xl    Morbid obesity with body mass index (BMI) of 40.0 to 44.9 in adult  -weight loss    Hypertension  Titrate medications     Acute exacerbation of CHF (congestive heart failure)  New onset CHF with EF 40%  Needs ischemia evaluation once euvolemic  Monitor I/Os  Continue IV diuresis     12/11/23  -Still overloaded  -Continue IV diuresis  -Continue ACEi, BB  -Strict I's/O's  -Ischemic workup once volume status improved    12/12/23  -Needs continued IV diuresis  -Continue BB, ACEi  -ZOE/DCCV and ischemic workup once compensated    12/13/23  -See above, needs continued IV diuresis    12/14/23  -Continue IV diuresis     12/15/23  -Improving, continued IV diuresis     12/16/23  -Stop IV lasix and metolazone  -Monitor volume status    VTE Risk Mitigation (From admission, onward)           Ordered     heparin 25,000 units in dextrose 5% 250 mL (100 units/mL) infusion LOW INTENSITY nomogram - [facility]  Continuous        Question:  Begin at (units/kg/hr)  Answer:  12    12/10/23 1752     heparin 25,000 units in dextrose 5% (100 units/ml) IV bolus from bag - ADDITIONAL PRN BOLUS - 60 units/kg  As needed (PRN)        Question:  Heparin Infusion Adjustment (DO NOT MODIFY ANSWER)  Answer:  \\[facility]\epic\Images\Pharmacy\HeparinInfusions\heparin LOW INTENSITY nomogram for [facility] [id number].pdf    12/10/23 1752     heparin 25,000 units in dextrose 5% (100 units/ml) IV bolus from bag - ADDITIONAL PRN BOLUS - 30 units/kg  As needed (PRN)        Question:  Heparin Infusion Adjustment (DO NOT MODIFY ANSWER)  Answer:  \\[facility]\epic\Images\Pharmacy\HeparinInfusions\heparin LOW INTENSITY nomogram for [facility] [id number].pdf    12/10/23 1752     Reason for No Pharmacological VTE Prophylaxis  Once        Question:  Reasons:  Answer:  Already adequately anticoagulated on oral  Anticoagulants    12/10/23 0102     IP VTE HIGH RISK PATIENT  Once         12/10/23 0102     Place sequential compression device  Until discontinued         12/10/23 0102                  Corazon Ellison MD.   Josette Guerra and Kathleen Lozada  Cardiology  [facility]

## 2023-12-16 NOTE — ASSESSMENT & PLAN NOTE
Body mass index is 41.69 kg/m². Morbid obesity complicates all aspects of disease management from diagnostic modalities to treatment. Weight loss encouraged and health benefits explained to patient.

## 2023-12-16 NOTE — PLAN OF CARE
Problem: Adult Inpatient Plan of Care  Goal: Plan of Care Review  Outcome: Ongoing, Progressing  Goal: Patient-Specific Goal (Individualized)  Outcome: Ongoing, Progressing  Goal: Absence of Hospital-Acquired Illness or Injury  Outcome: Ongoing, Progressing  Goal: Optimal Comfort and Wellbeing  Outcome: Ongoing, Progressing  Goal: Readiness for Transition of Care  Outcome: Ongoing, Progressing     Problem: Bariatric Environmental Safety  Goal: Safety Maintained with Care  Outcome: Ongoing, Progressing     Problem: Diabetes Comorbidity  Goal: Blood Glucose Level Within Targeted Range  Outcome: Ongoing, Progressing     Problem: Skin Injury Risk Increased  Goal: Skin Health and Integrity  Outcome: Ongoing, Progressing     Problem: Fall Injury Risk  Goal: Absence of Fall and Fall-Related Injury  Outcome: Ongoing, Progressing     Problem: Infection  Goal: Absence of Infection Signs and Symptoms  Outcome: Ongoing, Progressing

## 2023-12-16 NOTE — ASSESSMENT & PLAN NOTE
Patient with Paroxysmal (<7 days) atrial fibrillation which is uncontrolled currently with Beta Blocker and Calcium Channel Blocker. Patient is currently in atrial fibrillation.[id number] Score: 3. Anticoagulation indicated. Anticoagulation done with Eliquis .

## 2023-12-16 NOTE — ASSESSMENT & PLAN NOTE
Multifactorial secondary to lymphedema and CHF exacerbation   Wound care consulted, recommended outpatient referral to lymphedema clinic  Improved with diuresis

## 2023-12-16 NOTE — ASSESSMENT & PLAN NOTE
Patient is identified as having Diastolic (HFpEF) heart failure that is Acute on chronic. CHF is currently uncontrolled due to Continued edema of extremities and Dyspnea not returned to baseline after single doses of IV diuretic. Latest ECHO performed and demonstrates- Results for orders placed during the hospital encounter of 12/10/23    Echo    Interpretation Summary    Left Ventricle: The left ventricle is normal in size. Normal wall thickness. There is concentric remodeling. Mild global hyperkinesis present. There is reduced systolic function. Ejection fraction by visual approximation is 40%. There is normal diastolic function.    Right Ventricle: Moderate right ventricular enlargement. Wall thickness is normal. Systolic function is moderately reduced.    Mitral Valve: There is mild regurgitation.    Tricuspid Valve: There is moderate regurgitation.    Pulmonary Artery: The estimated pulmonary artery systolic pressure is 39 mmHg.    IVC/SVC: Intermediate venous pressure at 8 mmHg.  Continue Beta Blocker, ACE/ARB, and Furosemide and monitor clinical status closely. Monitor on telemetry. Patient is on CHF pathway.  Monitor strict Is&Os and daily weights.  Place on fluid restriction of 1.5 L. Cardiology has been consulted. Continue to stress to patient importance of self efficacy and  on diet for CHF. Last BNP reviewed- and noted below   Recent Labs   Lab 12/09/23  2302   *       -telemetry  -IV diuretics discontinued  -bipap qhs prn

## 2023-12-17 PROBLEM — N17.9 AKI (ACUTE KIDNEY INJURY): Status: ACTIVE | Noted: 2023-12-17

## 2023-12-17 LAB
ANION GAP SERPL CALC-SCNC: 14 MMOL/L (ref 8–16)
APTT PPP: 58.9 SEC (ref 21–32)
BASOPHILS # BLD AUTO: 0.02 K/UL (ref 0–0.2)
BASOPHILS NFR BLD: 0.2 % (ref 0–1.9)
BUN SERPL-MCNC: 47 MG/DL (ref 8–23)
CALCIUM SERPL-MCNC: 9.6 MG/DL (ref 8.7–10.5)
CHLORIDE SERPL-SCNC: 97 MMOL/L (ref 95–110)
CO2 SERPL-SCNC: 26 MMOL/L (ref 23–29)
CREAT SERPL-MCNC: 1.9 MG/DL (ref 0.5–1.4)
DIFFERENTIAL METHOD BLD: ABNORMAL
EOSINOPHIL # BLD AUTO: 0.1 K/UL (ref 0–0.5)
EOSINOPHIL NFR BLD: 1.4 % (ref 0–8)
ERYTHROCYTE [DISTWIDTH] IN BLOOD BY AUTOMATED COUNT: 15.3 % (ref 11.5–14.5)
EST. GFR  (NO RACE VARIABLE): 38 ML/MIN/1.73 M^2
GLUCOSE SERPL-MCNC: 138 MG/DL (ref 70–110)
HCT VFR BLD AUTO: 45.2 % (ref 40–54)
HGB BLD-MCNC: 15.3 G/DL (ref 14–18)
IMM GRANULOCYTES # BLD AUTO: 0.03 K/UL (ref 0–0.04)
IMM GRANULOCYTES NFR BLD AUTO: 0.3 % (ref 0–0.5)
LYMPHOCYTES # BLD AUTO: 2.6 K/UL (ref 1–4.8)
LYMPHOCYTES NFR BLD: 27.3 % (ref 18–48)
MAGNESIUM SERPL-MCNC: 2.3 MG/DL (ref 1.6–2.6)
MCH RBC QN AUTO: 25.9 PG (ref 27–31)
MCHC RBC AUTO-ENTMCNC: 33.8 G/DL (ref 32–36)
MCV RBC AUTO: 77 FL (ref 82–98)
MONOCYTES # BLD AUTO: 1 K/UL (ref 0.3–1)
MONOCYTES NFR BLD: 10.1 % (ref 4–15)
NEUTROPHILS # BLD AUTO: 5.9 K/UL (ref 1.8–7.7)
NEUTROPHILS NFR BLD: 60.7 % (ref 38–73)
NRBC BLD-RTO: 0 /100 WBC
PLATELET # BLD AUTO: 215 K/UL (ref 150–450)
PMV BLD AUTO: 11.7 FL (ref 9.2–12.9)
POTASSIUM SERPL-SCNC: 3.8 MMOL/L (ref 3.5–5.1)
RBC # BLD AUTO: 5.9 M/UL (ref 4.6–6.2)
SODIUM SERPL-SCNC: 137 MMOL/L (ref 136–145)
WBC # BLD AUTO: 9.68 K/UL (ref 3.9–12.7)

## 2023-12-17 PROCEDURE — 25000003 PHARM REV CODE 250: Performed by: INTERNAL MEDICINE

## 2023-12-17 PROCEDURE — 99900035 HC TECH TIME PER 15 MIN (STAT)

## 2023-12-17 PROCEDURE — 99233 SBSQ HOSP IP/OBS HIGH 50: CPT | Mod: ,,, | Performed by: INTERNAL MEDICINE

## 2023-12-17 PROCEDURE — 36415 COLL VENOUS BLD VENIPUNCTURE: CPT | Performed by: INTERNAL MEDICINE

## 2023-12-17 PROCEDURE — 93005 ELECTROCARDIOGRAM TRACING: CPT

## 2023-12-17 PROCEDURE — 97110 THERAPEUTIC EXERCISES: CPT | Mod: CQ

## 2023-12-17 PROCEDURE — 63600175 PHARM REV CODE 636 W HCPCS: Performed by: STUDENT IN AN ORGANIZED HEALTH CARE EDUCATION/TRAINING PROGRAM

## 2023-12-17 PROCEDURE — 25000003 PHARM REV CODE 250: Performed by: STUDENT IN AN ORGANIZED HEALTH CARE EDUCATION/TRAINING PROGRAM

## 2023-12-17 PROCEDURE — 21400001 HC TELEMETRY ROOM

## 2023-12-17 PROCEDURE — 97116 GAIT TRAINING THERAPY: CPT | Mod: CQ

## 2023-12-17 PROCEDURE — 80048 BASIC METABOLIC PNL TOTAL CA: CPT | Performed by: HOSPITALIST

## 2023-12-17 PROCEDURE — 85730 THROMBOPLASTIN TIME PARTIAL: CPT | Performed by: INTERNAL MEDICINE

## 2023-12-17 PROCEDURE — 85025 COMPLETE CBC W/AUTO DIFF WBC: CPT | Performed by: INTERNAL MEDICINE

## 2023-12-17 PROCEDURE — 93010 ELECTROCARDIOGRAM REPORT: CPT | Mod: ,,, | Performed by: INTERNAL MEDICINE

## 2023-12-17 PROCEDURE — 83735 ASSAY OF MAGNESIUM: CPT | Performed by: INTERNAL MEDICINE

## 2023-12-17 PROCEDURE — 25000003 PHARM REV CODE 250: Performed by: HOSPITALIST

## 2023-12-17 RX ADMIN — MAGNESIUM OXIDE TAB 400 MG (241.3 MG ELEMENTAL MG) 400 MG: 400 (241.3 MG) TAB at 08:12

## 2023-12-17 RX ADMIN — LISINOPRIL 20 MG: 20 TABLET ORAL at 08:12

## 2023-12-17 RX ADMIN — CETIRIZINE HYDROCHLORIDE 10 MG: 10 TABLET, FILM COATED ORAL at 08:12

## 2023-12-17 RX ADMIN — HEPARIN SODIUM 19 UNITS/KG/HR: 10000 INJECTION, SOLUTION INTRAVENOUS at 01:12

## 2023-12-17 RX ADMIN — METOPROLOL SUCCINATE 50 MG: 50 TABLET, EXTENDED RELEASE ORAL at 10:12

## 2023-12-17 RX ADMIN — SENNOSIDES AND DOCUSATE SODIUM 2 TABLET: 8.6; 5 TABLET ORAL at 10:12

## 2023-12-17 RX ADMIN — METOPROLOL SUCCINATE 50 MG: 50 TABLET, EXTENDED RELEASE ORAL at 08:12

## 2023-12-17 RX ADMIN — ASPIRIN 81 MG CHEWABLE TABLET 81 MG: 81 TABLET CHEWABLE at 08:12

## 2023-12-17 RX ADMIN — METHIMAZOLE 5 MG: 5 TABLET ORAL at 08:12

## 2023-12-17 NOTE — SUBJECTIVE & OBJECTIVE
Interval History: No acute events overnight.  Seen and examined sitting up in chair without any family present.  Reports that he has already walked around in the unit this morning.  Denies any complaints including shortness of breath, chest pain, nausea.  Creatinine increased to 1.9 today, we will hold off on resuming p.o. diuretics.  Planned for danielle/cardioversion tomorrow morning by Cardiology, NPO after midnight      Review of Systems  Objective:     Vital Signs (Most Recent):  Temp: 98 °F (36.7 °C) (12/17/23 1204)  Pulse: 81 (12/17/23 1204)  Resp: 16 (12/17/23 0712)  BP: (!) 108/55 (12/17/23 1204)  SpO2: 98 % (12/17/23 1204) Vital Signs (24h Range):  Temp:  [97.8 °F (36.6 °C)-99.1 °F (37.3 °C)] 98 °F (36.7 °C)  Pulse:  [57-91] 81  Resp:  [16-18] 16  SpO2:  [93 %-98 %] 98 %  BP: (101-124)/(52-75) 108/55     Weight: 135.6 kg (298 lb 15.1 oz)  Body mass index is 41.69 kg/m².    Intake/Output Summary (Last 24 hours) at 12/17/2023 1221  Last data filed at 12/17/2023 0846  Gross per 24 hour   Intake 708 ml   Output 1390 ml   Net -682 ml         Physical Exam  Vitals and nursing note reviewed.   Constitutional:       General: He is not in acute distress.     Appearance: He is obese. He is not ill-appearing or diaphoretic.   HENT:      Head: Normocephalic and atraumatic.      Right Ear: External ear normal.      Left Ear: External ear normal.      Nose: Nose normal.      Mouth/Throat:      Mouth: Mucous membranes are moist.   Eyes:      Extraocular Movements: Extraocular movements intact.      Pupils: Pupils are equal, round, and reactive to light.   Cardiovascular:      Rate and Rhythm: Normal rate and regular rhythm.   Pulmonary:      Effort: Pulmonary effort is normal. No respiratory distress.      Breath sounds: No wheezing or rales.      Comments: On room air  Abdominal:      General: Bowel sounds are normal.      Palpations: Abdomen is soft.      Tenderness: There is no abdominal tenderness. There is no guarding or  rebound.   Musculoskeletal:      Cervical back: Neck supple.      Right lower leg: Pitting Edema present.      Left lower leg: Pitting Edema present.      Comments: Lymphedema noted on bilateral lower extremities.  Minimal edema on bilateral lower extremities significant wrinkling of lower extremities noted   Skin:     General: Skin is warm and dry.   Neurological:      General: No focal deficit present.      Mental Status: He is alert and oriented to person, place, and time.   Psychiatric:         Mood and Affect: Mood normal.             Significant Labs: All pertinent labs within the past 24 hours have been reviewed.  CBC:   Recent Labs   Lab 12/16/23  0618 12/17/23  0609   WBC 8.39 9.68   HGB 15.3 15.3   HCT 46.1 45.2    215     CMP:   Recent Labs   Lab 12/16/23  0618 12/17/23  0609    137   K 3.3* 3.8   CL 95 97   CO2 30* 26   * 138*   BUN 33* 47*   CREATININE 1.6* 1.9*   CALCIUM 9.3 9.6   ANIONGAP 13 14     Magnesium:   Recent Labs   Lab 12/16/23 0618 12/17/23  0609   MG 2.1 2.3       Significant Imaging: I have reviewed all pertinent imaging results/findings within the past 24 hours.

## 2023-12-17 NOTE — ASSESSMENT & PLAN NOTE
Chronic, controlled. Latest blood pressure and vitals reviewed-     Temp:  [97.8 °F (36.6 °C)-99.1 °F (37.3 °C)]   Pulse:  [57-91]   Resp:  [16-18]   BP: (101-124)/(52-75)   SpO2:  [93 %-98 %] .   Home meds for hypertension were reviewed and noted below.   Hypertension Medications               amLODIPine (NORVASC) 10 MG tablet Take 1 tablet (10 mg total) by mouth once daily.    furosemide (LASIX) 40 MG tablet Take 1 tablet (40 mg total) by mouth once daily.    lisinopriL (PRINIVIL,ZESTRIL) 20 MG tablet Take 1 tablet (20 mg total) by mouth once daily.    metoprolol succinate (TOPROL-XL) 50 MG 24 hr tablet Take 1 tablet (50 mg total) by mouth once daily.          While in the hospital, will manage blood pressure as follows; Continue home antihypertensive regimen     Will utilize p.r.n. blood pressure medication only if patient's blood pressure greater than 160/100 and he develops symptoms such as worsening chest pain or shortness of breath.

## 2023-12-17 NOTE — ASSESSMENT & PLAN NOTE
Patient with Paroxysmal (<7 days) atrial fibrillation which is uncontrolled currently with Beta Blocker and Calcium Channel Blocker. Patient is currently in atrial fibrillation.[id number] Score: 3. Anticoagulation indicated. Anticoagulation done with Eliquis .

## 2023-12-17 NOTE — PROGRESS NOTES
[facility] - Med Surg  Cardiology  Progress Note    Patient Name: Roland Ng  MRN: [id number]  Admission Date: 12/9/2023  Hospital Length of Stay: 7 days  Code Status: Full Code   Attending Physician: Solange Blanchard DO   Primary Care Physician: Janis Green NP  Expected Discharge Date:   Principal Problem:Atrial flutter    Subjective:   HPI:  68 year old  male with PMHX of CHF and atrial fibrillation presented  to ED with worsening constant SOB x 1 month. Pt  reports sleeping with four pillows and waking up with SOB in the night.  Associated sxs include  intermittent sharp chest pain,  dizziness,  abdominal swelling and BLE  swelling. Pt states he is on a fluid  pill he takes BID since BLE  swelling and  venous stasis onset in 2016. Pt states abdominal swelling is new. Pt was seen in [facility]  ED on 12/523 for SOB and was instructed  to double Lasix dose. Noted HR  was in 130s in aflutter on 12/5, was seen at [facility].       Troponin was 0.031 yesterday. . Potassium  3.4. LFTs are elevated. LST 72/ WBC is 13.   EKG shows atrial flutter with variable AV block, Right bundle branch block, Septal infarct.   EF down to 40%, prior was 60% in March 2022    Hospital Course:   12/11/23-Patient seen and examined today, sitting up in bed. Feels ok. Remains SOB, needs continued IV diuresis. HR controlled. Labs reviewed.     12/12/23-Patient seen and examined today, sitting up in bed. Diuresing well. SOB improving but still significantly overloaded. HR variable. Labs reviewed. Creatinine 1.0, K 3.4. Needs ZOE/DCCV once compensated.    12/13/23-Patient seen and examined today, sitting up in bedside chair. Diuresing well. States he was able to ambulate down the hallway today. Still overloaded, continue diuretics. Labs reviewed/stable.    12/14/23-Patient seen and examined today, resting in bed. Feels ok. Diuresing well. SOB/edema improving. Creatinine stable. Plans for ZOE/DCCV once compensated.     12/15/23-Patient seen  and examined today, sitting up in bedside chair. Continues to improve. Starting to get wrinkles in BLE. SOB better. Creatinine 1.3, BUN up-trending.    12/16/23  Pt Diuresed net negative 14L so far. His vitals are stable and labs are with decreased potassium. BUN creatinine slightly elevated, possibly due to being euvolemic. We will DC IV lasix and metolazone.   Likely will need pt stable for ZOE cardioversion on Monday or Tuesday.     12/17/23  Pt remains euvolemic. Negative 14L diuressed so far. His vitals are stable, but his BUN creatnine is mildly worse than yesterday. Projected plan for ZOE cardioversion tomorrow; will make PT NPO after midnight. Pt's EKG shows atrial flutter.   Pt states that he feels good today, but is still dry.     ROS  Objective:      Vital Signs (Most Recent):  Temp: 98 °F (36.7 °C) (12/16/23 0732)  Pulse: 81 (12/16/23 0732)  Resp: 16 (12/16/23 0732)  BP: (!) 112/58 (12/16/23 0732)  SpO2: (!) 93 % (12/16/23 0732) Vital Signs (24h Range):  Temp:  [97.5 °F (36.4 °C)-98.4 °F (36.9 °C)] 98 °F (36.7 °C)  Pulse:  [40-92] 81  Resp:  [16-20] 16  SpO2:  [93 %-98 %] 93 %  BP: ()/(53-71) 112/58      Weight: 135.6 kg (298 lb 15.1 oz)  Body mass index is 41.69 kg/m².     SpO2: (!) 93 %        Intake/Output Summary (Last 24 hours) at 12/16/2023 0926  Last data filed at 12/16/2023 0830      Gross per 24 hour   Intake 240 ml   Output 600 ml   Net -360 ml         Lines/Drains/Airways         Peripheral Intravenous Line  Duration                     Midline Catheter Insertion/Assessment  - Single Lumen 12/15/23 2231 Left basilic vein (medial side of arm) other (see comments) <1 day                          Physical Exam  Assessment and Plan:   Patient who presents with atrial flutter/decompensated CHF. Improving, continue IV diuresis. ZOE/DCCV Next week.    * Atrial flutter  Rate controlled with toprol xl 50 bid  Once euvolemic, if still symptomatic with SOB, will consider ZOE/Cardioversion vs EP  evaluation for flutter/fib ablation    12/11/23  -Continue Toprol XL  -Continue heparin gtt  -Keep NPO after MN for possible ZOE/DCCV tmw    12/12/23  -Still volume overloaded, continue IV diuresis  -Continue BB, heparin gtt  -ZOE/DCCV once compensated    12/13/23  -See above    12/16/23  -Stop IV lasix and metolazone  -Monitor volume status  Lower extremity edema  -Assess response to IV diuresis  -CVI also contributing    Paroxysmal A-fib  -Continue anticoagulation and toprol xl    Morbid obesity with body mass index (BMI) of 40.0 to 44.9 in adult  -weight loss    Hypertension  Titrate medications     Acute exacerbation of CHF (congestive heart failure)  New onset CHF with EF 40%  Needs ischemia evaluation once euvolemic  Monitor I/Os  Continue IV diuresis     12/11/23  -Still overloaded  -Continue IV diuresis  -Continue ACEi, BB  -Strict I's/O's  -Ischemic workup once volume status improved    12/12/23  -Needs continued IV diuresis  -Continue BB, ACEi  -ZOE/DCCV and ischemic workup once compensated    12/13/23  -See above, needs continued IV diuresis    12/14/23  -Continue IV diuresis     12/15/23  -Improving, continued IV diuresis     12/16/23  -Stop IV lasix and metolazone  -Monitor volume status    12/17/23  -Plans for ZOE cardioversion tomorrow; NPO after midnight.    VTE Risk Mitigation (From admission, onward)           Ordered     heparin 25,000 units in dextrose 5% 250 mL (100 units/mL) infusion LOW INTENSITY nomogram - [facility]  Continuous        Question:  Begin at (units/kg/hr)  Answer:  12    12/10/23 1752     heparin 25,000 units in dextrose 5% (100 units/ml) IV bolus from bag - ADDITIONAL PRN BOLUS - 60 units/kg  As needed (PRN)        Question:  Heparin Infusion Adjustment (DO NOT MODIFY ANSWER)  Answer:  [email or web address]\Images\Pharmacy\HeparinInfusions\heparin LOW INTENSITY nomogram for [facility] [id number]    12/10/23 1752     heparin 25,000 units in dextrose 5% (100 units/ml) IV bolus from bag -  ADDITIONAL PRN BOLUS - 30 units/kg  As needed (PRN)        Question:  Heparin Infusion Adjustment (DO NOT MODIFY ANSWER)  Answer:  [email or web address]\epic\Images\Pharmacy\HeparinInfusions\heparin LOW INTENSITY nomogram for [facility] [id number]    12/10/23 1752     Reason for No Pharmacological VTE Prophylaxis  Once        Question:  Reasons:  Answer:  Already adequately anticoagulated on oral Anticoagulants    12/10/23 0102     IP VTE HIGH RISK PATIENT  Once         12/10/23 0102     Place sequential compression device  Until discontinued         12/10/23 0102                  Corazon Ellison MD.   Josette Guerra and Kathleen Lozada  Cardiology  [facility] - Med S[facility]

## 2023-12-17 NOTE — ASSESSMENT & PLAN NOTE
Patient with acute kidney injury/acute renal failure likely due to pre-renal azotemia due to IVVD GRAHAM is currently worsening. Baseline creatinine  1  - Labs reviewed- Renal function/electrolytes with Estimated Creatinine Clearance: 53.8 mL/min (A) (based on SCr of 1.9 mg/dL (H)). according to latest data. Monitor urine output and serial BMP and adjust therapy as needed. Avoid nephrotoxins and renally dose meds for GFR listed above.    Continue to hold diuretics   Monitor renal function  Monitor Is&Os

## 2023-12-17 NOTE — PLAN OF CARE
Discussed poc with pt, verbalized understanding     Purposeful rounding every 2hours    VS wnl  Cardiac monitoring in use  Fall precautions in place, remains injury free  Pt denies c/o n/v and pain    Heparin infusing at 19 units/kg/hr  Accurate I&Os  Bed locked at lowest position  Call light within reach    Chart check complete  Will cont with POC    Problem: Adult Inpatient Plan of Care  Goal: Plan of Care Review  Outcome: Ongoing, Progressing  Goal: Patient-Specific Goal (Individualized)  Outcome: Ongoing, Progressing  Goal: Absence of Hospital-Acquired Illness or Injury  Outcome: Ongoing, Progressing  Goal: Optimal Comfort and Wellbeing  Outcome: Ongoing, Progressing  Goal: Readiness for Transition of Care  Outcome: Ongoing, Progressing     Problem: Bariatric Environmental Safety  Goal: Safety Maintained with Care  Outcome: Ongoing, Progressing     Problem: Diabetes Comorbidity  Goal: Blood Glucose Level Within Targeted Range  Outcome: Ongoing, Progressing     Problem: Skin Injury Risk Increased  Goal: Skin Health and Integrity  Outcome: Ongoing, Progressing     Problem: Fall Injury Risk  Goal: Absence of Fall and Fall-Related Injury  Outcome: Ongoing, Progressing     Problem: Infection  Goal: Absence of Infection Signs and Symptoms  Outcome: Ongoing, Progressing

## 2023-12-17 NOTE — PT/OT/SLP PROGRESS
"Physical Therapy  Treatment    Roland Ng   MRN: [id number]   Admitting Diagnosis: Atrial flutter    PT Received On: 12/17/23  PT Start Time: 0737     PT Stop Time: 0802    PT Total Time (min): 25 min       Billable Minutes:  Gait Training 15 and Therapeutic Exercise 10    Treatment Type: Treatment  PT/PTA: PTA     Number of PTA visits since last PT visit: 1       General Precautions: Standard, fall  Orthopedic Precautions: N/A  Braces: N/A  Respiratory Status: Room air    Spiritual, Cultural Beliefs, Yazidism Practices, Values that Affect Care: no    Subjective:  Communicated with nurse Elias and completed Epic Chart review prior to session. Pt agreed to session.       Objective:   Patient found with: peripheral IV, telemetry    Rolling Left: SBA  Supine > Sit: SBA  Scoot towards EOB: SBA  Sit > Stand: Min A x1 with RW  Stand Pivot T/F: CGA with RW    Gait: Ambulated 300ft with RW with SBA. No LOB, no c/o SOB. All lines remained intact throughout ambulation.    Performed BLE TE AROM  x15 reps: Hip flex, AP, LAQ      Treatment and Education:  Reviewed importance of OOB activities and HEP ( hip flex, AP, LAQ, hip abd/add) in order to maintain/regain strength. Encouraged pt to sit up in chair for all meals. Reviewed "call don't fall" policy and increased risk of falling due to weakness. Instructed pt to utilize call button for assistance with all transfers. Pt agreeable to request.     AM-PAC 6 CLICK MOBILITY  How much help from another person does this patient currently need?   1 = Unable, Total/Dependent Assistance  2 = A lot, Maximum/Moderate Assistance  3 = A little, Minimum/Contact Guard/Supervision  4 = None, Modified [place]/Independent    Turning over in bed (including adjusting bedclothes, sheets and blankets)?: 3  Sitting down on and standing up from a chair with arms (e.g., wheelchair, bedside commode, etc.): 3  Moving from lying on back to sitting on the side of the bed?: 3  Moving to and " from a bed to a chair (including a wheelchair)?: 3  Need to walk in hospital room?: 3  Climbing 3-5 steps with a railing?: 1 (NT)  Basic Mobility Total Score: 16    AM-PAC Raw Score CMS G-Code Modifier Level of Impairment Assistance   6 % Total / Unable   7 - 9 CM 80 - 100% Maximal Assist   10 - 14 CL 60 - 80% Moderate Assist   15 - 19 CK 40 - 60% Moderate Assist   20 - 22 CJ 20 - 40% Minimal Assist   23 CI 1-20% SBA / CGA   24 CH 0% Independent/ Mod I     Patient left up in chair with all lines intact, call button in reach, and nurse notified.    Assessment:  Roland Ng is a 68 y.o. male with a medical diagnosis of Atrial flutter and presents with the following impairments/functional limitations listed below. Pt will continue to benefit from skilled PT in order to address functional limitations and return to PLOF/reach maximum level of function.    Rehab identified problem list/impairments: impaired self care skills, impaired endurance, weakness, impaired functional mobility, impaired balance, gait instability, decreased safety awareness, decreased lower extremity function, decreased coordination, impaired cardiopulmonary response to activity, edema    Rehab potential is good.    Activity tolerance: Good    Discharge recommendations: Low Intensity Therapy      Barriers to discharge:      Equipment recommendations: walker, rolling     GOALS:   Multidisciplinary Problems       Physical Therapy Goals          Problem: Physical Therapy    Goal Priority Disciplines Outcome Goal Variances Interventions   Physical Therapy Goal     PT, PT/OT Ongoing, Progressing     Description: Goals to be met by 12/25/23.  1. Pt will complete bed mobility MOD I.  2. Pt will complete sit to stand MOD I.  3. Pt will ambulate 200ft MOD I using RW.  4. Pt will increase AMPAC score by 2 points to progress functional mobility.                       PLAN:    Patient to be seen 3 x/week to address the above listed problems via  gait training, therapeutic activities, therapeutic exercises  Plan of Care expires: 12/25/23  Plan of Care reviewed with: patient         12/17/2023

## 2023-12-17 NOTE — ASSESSMENT & PLAN NOTE
Remains in atrial flutter, rate controlled with Toprol  Continue heparin infusion  Planned for ZOE/cardioversion once more euvolemic as per Cardiology, tentatively scheduled for Monday 12/18

## 2023-12-17 NOTE — PROGRESS NOTES
[facility] Medicine  Progress Note    Patient Name: Roland Ng  MRN: [id number]  Patient Class: IP- Inpatient   Admission Date: 12/9/2023  Length of Stay: 7 days  Attending Physician: Solange Blanchard DO  Primary Care Provider: Janis Green NP        Subjective:     Principal Problem:Atrial flutter        HPI:  Roland Ng is a 68 y.o. male with a PMH  has a past medical history of CAD (coronary artery disease), Chronic congestive heart failure (01/13/2021), Diabetes mellitus with no complication, Hypertension associated with diabetes, Hyperthyroidism (01/13/2021), Morbid obesity with body mass index (BMI) of 40.0 to 44.9 in adult, and Paroxysmal A-fib. who presented to the ED for further evaluation of progressively worsening dyspnea/shortness a breath was well as bilateral lower extremity, scrotal, and penile swelling/edema.  Patient reports significant history of heart failure and was previously followed by Dr. Decker but stated he is not seen him in quite some time.  Patient also reported being off his home medications for approximately 3-4 months prior to getting back on them due to insurance related issues and has been compliant over the past 3 months.  Patient also reports non adherence to fluid/dietary restrictions and states he drinks approximately 20 bottles of water daily.  He reported no known alleviating or aggravating factors noted and reported associated symptoms included dyspnea both at rest and on exertion, orthopnea, PND, and wheezing in addition to reported swelling as noted above.  Patient reports negative history of asthma/COPD, does not use home oxygen, and does not use home CPAP.  All other review of systems negative except as noted above.  Initial workup in the ED concerning for signs/symptoms of acute CHF exacerbation with patient being admitted to Hospital Medicine inpatient for continued medical management.      PCP: Janis Green  N.      Overview/Hospital Course:  Cardiology consulted.  Patient currently being treated for CHF exacerbation with IV diuretics.  Given persistent atrial flutter, planned for DANIELLE/cardioversion once euvolemic.    PT/OT recommends moderate intensity therapy on discharge. SW consulted for SNF placement.  Has been accepted at [facility], insurance authorization obtained.    Tentatively planned for danielle/cardioversion on Monday 12/18/2023.    Of note, patient recorded to have multiple instances of heart rate in the 40s on EMR.  However telemetry review did not show any bradycardia, patient's heart rate has been staying mostly in the 80s and occasionally goes up over 100s for short period of time.    Hospital course complicated by GRAHAM likely iatrogenic from diuresis.  Diuretics held      Interval History: No acute events overnight.  Seen and examined sitting up in chair without any family present.  Reports that he has already walked around in the unit this morning.  Denies any complaints including shortness of breath, chest pain, nausea.  Creatinine increased to 1.9 today, we will hold off on resuming p.o. diuretics.  Planned for danielle/cardioversion tomorrow morning by Cardiology, NPO after midnight      Review of Systems  Objective:     Vital Signs (Most Recent):  Temp: 98 °F (36.7 °C) (12/17/23 1204)  Pulse: 81 (12/17/23 1204)  Resp: 16 (12/17/23 0712)  BP: (!) 108/55 (12/17/23 1204)  SpO2: 98 % (12/17/23 1204) Vital Signs (24h Range):  Temp:  [97.8 °F (36.6 °C)-99.1 °F (37.3 °C)] 98 °F (36.7 °C)  Pulse:  [57-91] 81  Resp:  [16-18] 16  SpO2:  [93 %-98 %] 98 %  BP: (101-124)/(52-75) 108/55     Weight: 135.6 kg (298 lb 15.1 oz)  Body mass index is 41.69 kg/m².    Intake/Output Summary (Last 24 hours) at 12/17/2023 1221  Last data filed at 12/17/2023 0846  Gross per 24 hour   Intake 708 ml   Output 1390 ml   Net -682 ml         Physical Exam  Vitals and nursing note reviewed.   Constitutional:       General: He is not in  acute distress.     Appearance: He is obese. He is not ill-appearing or diaphoretic.   HENT:      Head: Normocephalic and atraumatic.      Right Ear: External ear normal.      Left Ear: External ear normal.      Nose: Nose normal.      Mouth/Throat:      Mouth: Mucous membranes are moist.   Eyes:      Extraocular Movements: Extraocular movements intact.      Pupils: Pupils are equal, round, and reactive to light.   Cardiovascular:      Rate and Rhythm: Normal rate and regular rhythm.   Pulmonary:      Effort: Pulmonary effort is normal. No respiratory distress.      Breath sounds: No wheezing or rales.      Comments: On room air  Abdominal:      General: Bowel sounds are normal.      Palpations: Abdomen is soft.      Tenderness: There is no abdominal tenderness. There is no guarding or rebound.   Musculoskeletal:      Cervical back: Neck supple.      Right lower leg: Pitting Edema present.      Left lower leg: Pitting Edema present.      Comments: Lymphedema noted on bilateral lower extremities.  Minimal edema on bilateral lower extremities significant wrinkling of lower extremities noted   Skin:     General: Skin is warm and dry.   Neurological:      General: No focal deficit present.      Mental Status: He is alert and oriented to person, place, and time.   Psychiatric:         Mood and Affect: Mood normal.             Significant Labs: All pertinent labs within the past 24 hours have been reviewed.  CBC:   Recent Labs   Lab 12/16/23  0618 12/17/23  0609   WBC 8.39 9.68   HGB 15.3 15.3   HCT 46.1 45.2    215     CMP:   Recent Labs   Lab 12/16/23  0618 12/17/23  0609    137   K 3.3* 3.8   CL 95 97   CO2 30* 26   * 138*   BUN 33* 47*   CREATININE 1.6* 1.9*   CALCIUM 9.3 9.6   ANIONGAP 13 14     Magnesium:   Recent Labs   Lab 12/16/23  0618 12/17/23  0609   MG 2.1 2.3       Significant Imaging: I have reviewed all pertinent imaging results/findings within the past 24 hours.    Assessment/Plan:       * Atrial flutter  Remains in atrial flutter, rate controlled with Toprol  Continue heparin infusion  Planned for ZOE/cardioversion once more euvolemic as per Cardiology, tentatively scheduled for Monday 12/18      GRAHAM (acute kidney injury)  Patient with acute kidney injury/acute renal failure likely due to pre-renal azotemia due to IVVD GRAHAM is currently worsening. Baseline creatinine  1  - Labs reviewed- Renal function/electrolytes with Estimated Creatinine Clearance: 53.8 mL/min (A) (based on SCr of 1.9 mg/dL (H)). according to latest data. Monitor urine output and serial BMP and adjust therapy as needed. Avoid nephrotoxins and renally dose meds for GFR listed above.    Continue to hold diuretics   Monitor renal function  Monitor Is&Os    Lymphedema  Referral to Lymphedema clinic outpatient      Scrotal swelling  Ultrasound showed large bilateral hydrocele with extensive scrotal wall thickening or edema, no abscess   Likely secondary to excessive volume in setting of CHF exacerbation      Lower extremity edema  Multifactorial secondary to lymphedema and CHF exacerbation   Wound care consulted, recommended outpatient referral to lymphedema clinic  Improved with diuresis      SOB (shortness of breath)  In setting of CHF exacerbation  Resolved    Paroxysmal A-fib  Patient with Paroxysmal (<7 days) atrial fibrillation which is uncontrolled currently with Beta Blocker and Calcium Channel Blocker. Patient is currently in atrial fibrillation.[id number] Score: 3. Anticoagulation indicated. Anticoagulation done with Eliquis .      Atherosclerosis of native coronary artery of native heart without angina pectoris  Patient with known CAD s/p  LHC without stent placement , which is controlled Will continue  Eliquis, ASA, and Statin and monitor for S/Sx of angina/ACS. Continue to monitor on telemetry.       Morbid obesity with body mass index (BMI) of 40.0 to 44.9 in adult  Body mass index is 41.69 kg/m². Morbid obesity  complicates all aspects of disease management from diagnostic modalities to treatment. Weight loss encouraged and health benefits explained to patient.       Hypertension  Chronic, controlled. Latest blood pressure and vitals reviewed-     Temp:  [97.8 °F (36.6 °C)-99.1 °F (37.3 °C)]   Pulse:  [57-91]   Resp:  [16-18]   BP: (101-124)/(52-75)   SpO2:  [93 %-98 %] .   Home meds for hypertension were reviewed and noted below.   Hypertension Medications               amLODIPine (NORVASC) 10 MG tablet Take 1 tablet (10 mg total) by mouth once daily.    furosemide (LASIX) 40 MG tablet Take 1 tablet (40 mg total) by mouth once daily.    lisinopriL (PRINIVIL,ZESTRIL) 20 MG tablet Take 1 tablet (20 mg total) by mouth once daily.    metoprolol succinate (TOPROL-XL) 50 MG 24 hr tablet Take 1 tablet (50 mg total) by mouth once daily.          While in the hospital, will manage blood pressure as follows; Continue home antihypertensive regimen     Will utilize p.r.n. blood pressure medication only if patient's blood pressure greater than 160/100 and he develops symptoms such as worsening chest pain or shortness of breath.      Acute exacerbation of CHF (congestive heart failure)  Patient is identified as having Diastolic (HFpEF) heart failure that is Acute on chronic. CHF is currently uncontrolled due to Continued edema of extremities and Dyspnea not returned to baseline after single doses of IV diuretic. Latest ECHO performed and demonstrates- Results for orders placed during the hospital encounter of 12/10/23    Echo    Interpretation Summary    Left Ventricle: The left ventricle is normal in size. Normal wall thickness. There is concentric remodeling. Mild global hyperkinesis present. There is reduced systolic function. Ejection fraction by visual approximation is 40%. There is normal diastolic function.    Right Ventricle: Moderate right ventricular enlargement. Wall thickness is normal. Systolic function is moderately  "reduced.    Mitral Valve: There is mild regurgitation.    Tricuspid Valve: There is moderate regurgitation.    Pulmonary Artery: The estimated pulmonary artery systolic pressure is 39 mmHg.    IVC/SVC: Intermediate venous pressure at 8 mmHg.  Continue Beta Blocker, ACE/ARB, and Furosemide and monitor clinical status closely. Monitor on telemetry. Patient is on CHF pathway.  Monitor strict Is&Os and daily weights.  Place on fluid restriction of 1.5 L. Cardiology has been consulted. Continue to stress to patient importance of self efficacy and  on diet for CHF. Last BNP reviewed- and noted below   No results for input(s): "BNP", "BNPTRIAGEBLO" in the last 168 hours.    -telemetry  -appears euvolemic, slightly dry IV diuretics discontinued.  Resume p.o. diuretic as appropriate  -bipap qhs prn      Hyperthyroidism  Patient has chronic hyperthyroidism. TFTs reviewed-   Lab Results   Component Value Date    TSH 0.083 (L) 12/10/2023     Although TSH is slightly low, free T4 is within normal limits  Will continue home methimazole  Outpatient follow-up for further monitoring and medication titration as needed      VTE Risk Mitigation (From admission, onward)           Ordered     heparin 25,000 units in dextrose 5% 250 mL (100 units/mL) infusion LOW INTENSITY nomogram - [facility]  Continuous        Question:  Begin at (units/kg/hr)  Answer:  12    12/10/23 1752     heparin 25,000 units in dextrose 5% (100 units/ml) IV bolus from bag - ADDITIONAL PRN BOLUS - 60 units/kg  As needed (PRN)        Question:  Heparin Infusion Adjustment (DO NOT MODIFY ANSWER)  Answer:  \\[email or web address]\epic\Images\Pharmacy\HeparinInfusions\heparin LOW INTENSITY nomogram for [facility] [id number]    12/10/23 1752     heparin 25,000 units in dextrose 5% (100 units/ml) IV bolus from bag - ADDITIONAL PRN BOLUS - 30 units/kg  As needed (PRN)        Question:  Heparin Infusion Adjustment (DO NOT MODIFY ANSWER)  Answer:  " \\[email or web address]\epic\Images\Pharmacy\HeparinInfusions\heparin LOW INTENSITY nomogram for [facility] [id number].pdf    12/10/23 1752     Reason for No Pharmacological VTE Prophylaxis  Once        Question:  Reasons:  Answer:  Already adequately anticoagulated on oral Anticoagulants    12/10/23 0102     IP VTE HIGH RISK PATIENT  Once         12/10/23 0102     Place sequential compression device  Until discontinued         12/10/23 0102                    Discharge Planning   RABIA:      Code Status: Full Code   Is the patient medically ready for discharge?:     Reason for patient still in hospital (select all that apply): Patient trending condition, Treatment, and Consult recommendations  Discharge Plan A: Skilled Nursing Facility                  Solange Blanchard DO  Department of Hospital Medicine   [facility] - Med Surg

## 2023-12-17 NOTE — ASSESSMENT & PLAN NOTE
"Patient is identified as having Diastolic (HFpEF) heart failure that is Acute on chronic. CHF is currently uncontrolled due to Continued edema of extremities and Dyspnea not returned to baseline after single doses of IV diuretic. Latest ECHO performed and demonstrates- Results for orders placed during the hospital encounter of 12/10/23    Echo    Interpretation Summary    Left Ventricle: The left ventricle is normal in size. Normal wall thickness. There is concentric remodeling. Mild global hyperkinesis present. There is reduced systolic function. Ejection fraction by visual approximation is 40%. There is normal diastolic function.    Right Ventricle: Moderate right ventricular enlargement. Wall thickness is normal. Systolic function is moderately reduced.    Mitral Valve: There is mild regurgitation.    Tricuspid Valve: There is moderate regurgitation.    Pulmonary Artery: The estimated pulmonary artery systolic pressure is 39 mmHg.    IVC/SVC: Intermediate venous pressure at 8 mmHg.  Continue Beta Blocker, ACE/ARB, and Furosemide and monitor clinical status closely. Monitor on telemetry. Patient is on CHF pathway.  Monitor strict Is&Os and daily weights.  Place on fluid restriction of 1.5 L. Cardiology has been consulted. Continue to stress to patient importance of self efficacy and  on diet for CHF. Last BNP reviewed- and noted below   No results for input(s): "BNP", "BNPTRIAGEBLO" in the last 168 hours.    -telemetry  -appears euvolemic, slightly dry IV diuretics discontinued.  Resume p.o. diuretic as appropriate  -bipap qhs prn    "

## 2023-12-18 ENCOUNTER — ANESTHESIA EVENT (OUTPATIENT)
Dept: CARDIOLOGY | Facility: HOSPITAL | Age: 68
DRG: 291 | End: 2023-12-18
Payer: COMMERCIAL

## 2023-12-18 ENCOUNTER — ANESTHESIA (OUTPATIENT)
Dept: CARDIOLOGY | Facility: HOSPITAL | Age: 68
DRG: 291 | End: 2023-12-18
Payer: COMMERCIAL

## 2023-12-18 LAB
ANION GAP SERPL CALC-SCNC: 14 MMOL/L (ref 8–16)
APTT PPP: 61.7 SEC (ref 21–32)
BASOPHILS # BLD AUTO: 0.03 K/UL (ref 0–0.2)
BASOPHILS NFR BLD: 0.3 % (ref 0–1.9)
BSA FOR ECHO PROCEDURE: 2.6 M2
BUN SERPL-MCNC: 53 MG/DL (ref 8–23)
CALCIUM SERPL-MCNC: 9.7 MG/DL (ref 8.7–10.5)
CHLORIDE SERPL-SCNC: 98 MMOL/L (ref 95–110)
CO2 SERPL-SCNC: 27 MMOL/L (ref 23–29)
CREAT SERPL-MCNC: 1.8 MG/DL (ref 0.5–1.4)
DIFFERENTIAL METHOD BLD: ABNORMAL
EOSINOPHIL # BLD AUTO: 0.1 K/UL (ref 0–0.5)
EOSINOPHIL NFR BLD: 0.9 % (ref 0–8)
ERYTHROCYTE [DISTWIDTH] IN BLOOD BY AUTOMATED COUNT: 15.8 % (ref 11.5–14.5)
EST. GFR  (NO RACE VARIABLE): 41 ML/MIN/1.73 M^2
GLUCOSE SERPL-MCNC: 110 MG/DL (ref 70–110)
HCT VFR BLD AUTO: 47.6 % (ref 40–54)
HGB BLD-MCNC: 15.6 G/DL (ref 14–18)
IMM GRANULOCYTES # BLD AUTO: 0.02 K/UL (ref 0–0.04)
IMM GRANULOCYTES NFR BLD AUTO: 0.2 % (ref 0–0.5)
LYMPHOCYTES # BLD AUTO: 2.4 K/UL (ref 1–4.8)
LYMPHOCYTES NFR BLD: 26.6 % (ref 18–48)
MAGNESIUM SERPL-MCNC: 2.3 MG/DL (ref 1.6–2.6)
MCH RBC QN AUTO: 25.3 PG (ref 27–31)
MCHC RBC AUTO-ENTMCNC: 32.8 G/DL (ref 32–36)
MCV RBC AUTO: 77 FL (ref 82–98)
MONOCYTES # BLD AUTO: 0.8 K/UL (ref 0.3–1)
MONOCYTES NFR BLD: 9 % (ref 4–15)
NEUTROPHILS # BLD AUTO: 5.8 K/UL (ref 1.8–7.7)
NEUTROPHILS NFR BLD: 63 % (ref 38–73)
NRBC BLD-RTO: 0 /100 WBC
PLATELET # BLD AUTO: 218 K/UL (ref 150–450)
PMV BLD AUTO: 11.3 FL (ref 9.2–12.9)
POTASSIUM SERPL-SCNC: 3.8 MMOL/L (ref 3.5–5.1)
RA PRESSURE ESTIMATED: 3 MMHG
RBC # BLD AUTO: 6.16 M/UL (ref 4.6–6.2)
SODIUM SERPL-SCNC: 139 MMOL/L (ref 136–145)
WBC # BLD AUTO: 9.15 K/UL (ref 3.9–12.7)

## 2023-12-18 PROCEDURE — 99499 UNLISTED E&M SERVICE: CPT | Mod: ,,, | Performed by: INTERNAL MEDICINE

## 2023-12-18 PROCEDURE — 85730 THROMBOPLASTIN TIME PARTIAL: CPT | Performed by: INTERNAL MEDICINE

## 2023-12-18 PROCEDURE — 25000003 PHARM REV CODE 250: Performed by: INTERNAL MEDICINE

## 2023-12-18 PROCEDURE — 97116 GAIT TRAINING THERAPY: CPT

## 2023-12-18 PROCEDURE — 85025 COMPLETE CBC W/AUTO DIFF WBC: CPT | Performed by: INTERNAL MEDICINE

## 2023-12-18 PROCEDURE — 37000008 HC ANESTHESIA 1ST 15 MINUTES: Performed by: INTERNAL MEDICINE

## 2023-12-18 PROCEDURE — 25000003 PHARM REV CODE 250: Performed by: HOSPITALIST

## 2023-12-18 PROCEDURE — 25000003 PHARM REV CODE 250: Performed by: STUDENT IN AN ORGANIZED HEALTH CARE EDUCATION/TRAINING PROGRAM

## 2023-12-18 PROCEDURE — 80048 BASIC METABOLIC PNL TOTAL CA: CPT | Performed by: HOSPITALIST

## 2023-12-18 PROCEDURE — 25000003 PHARM REV CODE 250: Performed by: NURSE ANESTHETIST, CERTIFIED REGISTERED

## 2023-12-18 PROCEDURE — 63600175 PHARM REV CODE 636 W HCPCS: Performed by: NURSE ANESTHETIST, CERTIFIED REGISTERED

## 2023-12-18 PROCEDURE — 92960 CARDIOVERSION ELECTRIC EXT: CPT | Mod: ,,, | Performed by: INTERNAL MEDICINE

## 2023-12-18 PROCEDURE — 83735 ASSAY OF MAGNESIUM: CPT | Performed by: INTERNAL MEDICINE

## 2023-12-18 PROCEDURE — 37000009 HC ANESTHESIA EA ADD 15 MINS: Performed by: INTERNAL MEDICINE

## 2023-12-18 PROCEDURE — 5A2204Z RESTORATION OF CARDIAC RHYTHM, SINGLE: ICD-10-PCS | Performed by: INTERNAL MEDICINE

## 2023-12-18 PROCEDURE — 63600175 PHARM REV CODE 636 W HCPCS: Performed by: STUDENT IN AN ORGANIZED HEALTH CARE EDUCATION/TRAINING PROGRAM

## 2023-12-18 PROCEDURE — 97530 THERAPEUTIC ACTIVITIES: CPT

## 2023-12-18 PROCEDURE — 93005 ELECTROCARDIOGRAM TRACING: CPT

## 2023-12-18 PROCEDURE — 93010 ELECTROCARDIOGRAM REPORT: CPT | Mod: ,,, | Performed by: INTERNAL MEDICINE

## 2023-12-18 PROCEDURE — 21400001 HC TELEMETRY ROOM

## 2023-12-18 RX ORDER — PHENYLEPHRINE HYDROCHLORIDE 10 MG/ML
INJECTION INTRAVENOUS
Status: DISCONTINUED | OUTPATIENT
Start: 2023-12-18 | End: 2023-12-18

## 2023-12-18 RX ORDER — PROPOFOL 10 MG/ML
VIAL (ML) INTRAVENOUS
Status: DISCONTINUED | OUTPATIENT
Start: 2023-12-18 | End: 2023-12-18

## 2023-12-18 RX ORDER — LIDOCAINE HYDROCHLORIDE 20 MG/ML
INJECTION INTRAVENOUS
Status: DISCONTINUED | OUTPATIENT
Start: 2023-12-18 | End: 2023-12-18

## 2023-12-18 RX ADMIN — HEPARIN SODIUM 19 UNITS/KG/HR: 10000 INJECTION, SOLUTION INTRAVENOUS at 04:12

## 2023-12-18 RX ADMIN — PROPOFOL 25 MG: 10 INJECTION, EMULSION INTRAVENOUS at 11:12

## 2023-12-18 RX ADMIN — PROPOFOL 40 MG: 10 INJECTION, EMULSION INTRAVENOUS at 11:12

## 2023-12-18 RX ADMIN — METOPROLOL SUCCINATE 50 MG: 50 TABLET, EXTENDED RELEASE ORAL at 08:12

## 2023-12-18 RX ADMIN — CETIRIZINE HYDROCHLORIDE 10 MG: 10 TABLET, FILM COATED ORAL at 09:12

## 2023-12-18 RX ADMIN — PHENYLEPHRINE HYDROCHLORIDE 100 MCG: 10 INJECTION INTRAVENOUS at 11:12

## 2023-12-18 RX ADMIN — GLYCOPYRROLATE 0.2 MG: 0.2 INJECTION, SOLUTION INTRAMUSCULAR; INTRAVITREAL at 10:12

## 2023-12-18 RX ADMIN — LIDOCAINE HYDROCHLORIDE 50 MG: 20 INJECTION INTRAVENOUS at 10:12

## 2023-12-18 RX ADMIN — SODIUM CHLORIDE: 9 INJECTION, SOLUTION INTRAVENOUS at 10:12

## 2023-12-18 RX ADMIN — METHIMAZOLE 5 MG: 5 TABLET ORAL at 09:12

## 2023-12-18 RX ADMIN — ASPIRIN 81 MG CHEWABLE TABLET 81 MG: 81 TABLET CHEWABLE at 09:12

## 2023-12-18 NOTE — ASSESSMENT & PLAN NOTE
Patient with acute kidney injury/acute renal failure likely due to pre-renal azotemia due to IVVD GRAHAM is currently worsening. Baseline creatinine  1  - Labs reviewed- Renal function/electrolytes with Estimated Creatinine Clearance: 56.6 mL/min (A) (based on SCr of 1.8 mg/dL (H)). according to latest data. Monitor urine output and serial BMP and adjust therapy as needed. Avoid nephrotoxins and renally dose meds for GFR listed above.    Continue to hold diuretics   Monitor renal function  Monitor Is&Os

## 2023-12-18 NOTE — PLAN OF CARE
Pt awakened after procedure and remains AAOX3 at time of transfer.  An hour post admin Cetacaine, bedside swallow assessment performed; no s/s of aspiration at this time.  Transferred to [facility], via hospital bed, in no apparent distress.  Report given to BLADE Hobbs; no further questions at this time.

## 2023-12-18 NOTE — TRANSFER OF CARE
"Anesthesia Transfer of Care Note    Patient: Roland Ng    Procedure(s) Performed: Procedure(s) (LRB):  Transesophageal echo (ZOE) intra-procedure log documentation (N/A)    Patient location: Cath Lab    Anesthesia Type: MAC    Transport from OR: Transported from OR on room air with adequate spontaneous ventilation    Post pain: adequate analgesia    Post assessment: no apparent anesthetic complications and tolerated procedure well    Post vital signs: stable    Level of consciousness: responds to stimulation    Nausea/Vomiting: no nausea/vomiting    Complications: none    Transfer of care protocol was followed      Last vitals: Visit Vitals  BP (!) 88/53 (Patient Position: Lying)   Pulse 88   Temp 36.5 °C (97.7 °F) (Oral)   Resp 18   Ht 5' 11" (1.803 m)   Wt 135.1 kg (297 lb 13.5 oz)   SpO2 95%   BMI 41.54 kg/m²     "

## 2023-12-18 NOTE — SUBJECTIVE & OBJECTIVE
Interval History:  Patient seen and examined with family at bedside.  Discussed with Cardiology.  Patient underwent cardioversion without complications.  Plan is to go to [facility] in the morning.    Review of Systems   Constitutional:  Positive for fatigue. Negative for fever.   Respiratory:  Negative for cough, chest tightness and shortness of breath.    Cardiovascular:  Positive for palpitations and leg swelling. Negative for chest pain.   Neurological:  Positive for weakness.   All other systems reviewed and are negative.    Objective:     Vital Signs (Most Recent):  Temp: 98 °F (36.7 °C) (12/18/23 1702)  Pulse: 83 (12/18/23 1702)  Resp: 15 (12/18/23 1702)  BP: (!) 131/59 (12/18/23 1702)  SpO2: 99 % (12/18/23 1702) Vital Signs (24h Range):  Temp:  [97.4 °F (36.3 °C)-99.3 °F (37.4 °C)] 98 °F (36.7 °C)  Pulse:  [69-97] 83  Resp:  [15-20] 15  SpO2:  [94 %-99 %] 99 %  BP: ()/(53-78) 131/59     Weight: 135.1 kg (297 lb 13.5 oz)  Body mass index is 41.54 kg/m².    Intake/Output Summary (Last 24 hours) at 12/18/2023 1720  Last data filed at 12/18/2023 1116  Gross per 24 hour   Intake 420 ml   Output 975 ml   Net -555 ml         Physical Exam  Vitals reviewed.   Constitutional:       Appearance: Normal appearance. He is obese.   HENT:      Head: Normocephalic and atraumatic.      Mouth/Throat:      Mouth: Mucous membranes are moist.      Pharynx: Oropharynx is clear.   Eyes:      Extraocular Movements: Extraocular movements intact.      Conjunctiva/sclera: Conjunctivae normal.   Cardiovascular:      Rate and Rhythm: Normal rate and regular rhythm.      Pulses: Normal pulses.      Heart sounds: Normal heart sounds.   Pulmonary:      Effort: Pulmonary effort is normal.      Breath sounds: Normal breath sounds.   Abdominal:      General: Bowel sounds are normal.      Palpations: Abdomen is soft.   Musculoskeletal:         General: Normal range of motion.      Cervical back: Normal range of motion and neck  supple.      Right lower leg: Edema present.      Left lower leg: Edema present.      Comments: Trace lower extremity pitting edema   Skin:     General: Skin is warm and dry.   Neurological:      Mental Status: He is alert and oriented to person, place, and time. Mental status is at baseline.   Psychiatric:         Mood and Affect: Mood normal.         Behavior: Behavior normal.         Thought Content: Thought content normal.             Significant Labs: All pertinent labs within the past 24 hours have been reviewed.  CBC:   Recent Labs   Lab 12/17/23  0609 12/18/23  0535   WBC 9.68 9.15   HGB 15.3 15.6   HCT 45.2 47.6    218     CMP:   Recent Labs   Lab 12/17/23  0609 12/18/23  0535    139   K 3.8 3.8   CL 97 98   CO2 26 27   * 110   BUN 47* 53*   CREATININE 1.9* 1.8*   CALCIUM 9.6 9.7   ANIONGAP 14 14     Magnesium:   Recent Labs   Lab 12/17/23  0609 12/18/23  0535   MG 2.3 2.3     TSH:   Recent Labs   Lab 12/10/23  0240   TSH 0.083*       Significant Imaging: I have reviewed all pertinent imaging results/findings within the past 24 hours.

## 2023-12-18 NOTE — PT/OT/SLP PROGRESS
"Physical Therapy  Treatment    Roland Ng   MRN: [id number]   Admitting Diagnosis: Atrial flutter    PT Received On: 12/18/23  PT Start Time: 0825     PT Stop Time: 0848    PT Total Time (min): 23 min       Billable Minutes:  Gait Training 13 and Therapeutic Activity 10    Treatment Type: Treatment  PT/PTA: PT     Number of PTA visits since last PT visit: 0       General Precautions: Standard, fall  Orthopedic Precautions: N/A  Braces: N/A  Respiratory Status: Room air    Spiritual, Cultural Beliefs, Voodoo Practices, Values that Affect Care: no    Subjective:  Communicated with NURSE PATIÑO AND [facility] CHART REVIEW  prior to session.   PT AGREED TO TX     Pain/Comfort  Pain Rating 1: 0/10  Pain Rating Post-Intervention 1: 0/10    Objective:   Patient found with: peripheral IV, telemetry    Functional Mobility:  PT MET IN RM SUP IN BED. PT REPORTED, "IM GOING TO BE GOING TO SX. " P.T. ENCOURAGED PT TO PARTICIPATE AS PT WOULD NOT PROBABLY FEEL UP TO IT AFTER. PT AGREED AND THEN AGREED TO PARTICIPATE. PT LOG ROLLED TO LEFT AND SEATED EOB WITH S. PT SCOOTED TO EOB AND GT. BELT AND  SOCKS DONNED PRIOR TO OOB MOBILITY. PT STOOD WITH RW AND S FOR GT TRAINING. PT GT TRAINED X 260' WITH SLOW PACE WITH WIDE MATEO. PT RETURNED TO  T/F TO CHAIR WITH RW AND S. PT LEFT SEATED IN CHAIR WITH ALL NEEDS MET   Treatment and Education:  PT DECLINED TE AT THIS TIME. PT EDUCATED ON "CALL DON'T FALL", ENCOURAGED TO CALL FOR ASSISTANCE WITH ALL NEEDS FOR OOB MOBILITY.       AM-PAC 6 CLICK MOBILITY  How much help from another person does this patient currently need?   1 = Unable, Total/Dependent Assistance  2 = A lot, Maximum/Moderate Assistance  3 = A little, Minimum/Contact Guard/Supervision  4 = None, Modified [place]/Independent    Turning over in bed (including adjusting bedclothes, sheets and blankets)?: 4  Sitting down on and standing up from a chair with arms (e.g., wheelchair, bedside commode, etc.): 4  Moving " from lying on back to sitting on the side of the bed?: 4  Moving to and from a bed to a chair (including a wheelchair)?: 4  Need to walk in hospital room?: 4  Climbing 3-5 steps with a railing?: 1  Basic Mobility Total Score: 21    AM-PAC Raw Score CMS G-Code Modifier Level of Impairment Assistance   6 % Total / Unable   7 - 9 CM 80 - 100% Maximal Assist   10 - 14 CL 60 - 80% Moderate Assist   15 - 19 CK 40 - 60% Moderate Assist   20 - 22 CJ 20 - 40% Minimal Assist   23 CI 1-20% SBA / CGA   24 CH 0% Independent/ Mod I     Patient left up in chair with call button in reach.    Assessment:  PT KURT GT TRAINING WITH INC ENCOURAGEMENT.     Rehab identified problem list/impairments: weakness, impaired endurance, impaired balance, gait instability, impaired cardiopulmonary response to activity, impaired functional mobility    Rehab potential is good.    Activity tolerance: Fair    Discharge recommendations: Low Intensity Therapy      Barriers to discharge:      Equipment recommendations: walker, rolling     GOALS:   Multidisciplinary Problems       Physical Therapy Goals          Problem: Physical Therapy    Goal Priority Disciplines Outcome Goal Variances Interventions   Physical Therapy Goal     PT, PT/OT Ongoing, Progressing     Description: Goals to be met by 12/25/23.  1. Pt will complete bed mobility MOD I.  2. Pt will complete sit to stand MOD I.  3. Pt will ambulate 200ft MOD I using RW.  4. Pt will increase AMPAC score by 2 points to progress functional mobility.                       PLAN:    Patient to be seen 3 x/week to address the above listed problems via gait training, therapeutic activities, therapeutic exercises  Plan of Care expires: 12/25/23  Plan of Care reviewed with: patient         12/18/2023

## 2023-12-18 NOTE — PROGRESS NOTES
[facility] - Cath Lab ([facility])  Adult Nutrition  Progress Note    SUMMARY       Recommendations    Recommendation/Intervention:   1. Recommend advance pt's diet to Diabetic 2000 calorie, Cardiac diet   2. If unable to advance pt's diet from NPO by day 5, recommend re-consult for alternative means of nutrition   3. Weigh twice weekly    Goals:   1. Pt's diet will advance within 24 hrs   2. Pt will tolerate and intake > 75% EEN and EPN prior to RD follow up  3. Pt will maintain a stable weight by RD follow up  Nutrition Goal Status: new  Communication of RD Recs: other (comment) (POC, sticky note)    Assessment and Plan    Nutrition Problem  Inadequate oral intake     Related to (etiology):   Decreased ability to consume sufficient protein/energy     Signs and Symptoms (as evidenced by):   NPO x 4 days     Interventions/Recommendations (treatment strategy):  1. Decreased carbohydrate, sodium and fat modified diet  2. Collaboration with other providers     Nutrition Diagnosis Status:   New       Malnutrition Assessment     Skin (Micronutrient): dry, edema       Weight Loss (Malnutrition): greater than 5% in 1 month  Fluid Accumulation (Malnutrition): moderate to severe                         Reason for Assessment    Reason For Assessment: RD follow-up  Diagnosis:  (Atrial flutter)  Relevant Medical History: Hyperthyroidism, CHF, HTN, Morbid obesity, Atherosclerosis of native coronary artery, Paroxysmal A Fib, SOB, LE edema, Scrotal swelling, Lymphedema, GRAHAM   Hx: DM, MI, CAD  General Information Comments:   12/18/23: 68 y.o. Male admitted for Atrial flutter. Pt currently NPO. RD follow up to heart failure and healthy heart nutrition education. H&P noted that the pt presented to the ED for further evaluation of progressively worsening dyspnea/shortness a breath was well as bilateral lower extremity, scrotal, and penile swelling/edema, noted pt has PMH of significant CHF and pt reported non adherence to fluid/dietary  "restrictions. Hospital Medicine Physician noted 12/17 that the pt's GRAHAM is likely d/t diuresis, diuretics currently held d/t increased Cr, pt denied SOB which is now resolved and nausea, pt NPO after midnight d/t planned ZOE/Cardioversion, significant wrinkling of LE noted. Cardiology Physician noted 12/18 that pt is having ZOE/Cardioversion performed this morning. Reviewed chart: LBM 12/16; Skin: dry, flaky; Alexey score: 20 (no risk); Edema: 3+ moderate bilateral foot/ankle/leg, 4+ severe scrotum. Labs, meds, weight reviewed. Note heparin in D5W 19 units/kg/hr. Weight charted 11/9 318 lbs, 12/18 297 lbs (BMI 41.54, Morbid obesity), -21 lb wt loss (6% wt change) x 1+ month. Unable to performed NFPE d/t procedure, RD noted pt appeared well nourished at nutrition consult, will perform at follow up if warranted, BMI > 40. RD will follow pt during admit.    Nutrition Discharge Planning: Diabetic 2000 calorie, Cardiac diet    Nutrition Risk Screen    Nutrition Risk Screen: no indicators present    Nutrition/Diet History    Spiritual, Cultural Beliefs, Anabaptist Practices, Values that Affect Care: no  Food Allergies: NKFA  Factors Affecting Nutritional Intake: NPO    Anthropometrics    Temp: 97.9 °F (36.6 °C)  Height Method: Stated  Height: 5' 11" (180.3 cm)  Height (inches): 71 in  Weight Method: Bed Scale  Weight: 135.1 kg (297 lb 13.5 oz)  Weight (lb): 297.84 lb  Ideal Body Weight (IBW), Male: 172 lb  % Ideal Body Weight, Male (lb): 173.16 %  BMI (Calculated): 41.6  BMI Grade: greater than 40 - morbid obesity     Wt Readings from Last 15 Encounters:   12/18/23 135.1 kg (297 lb 13.5 oz)   11/09/23 (!) 144.3 kg (318 lb 3.7 oz)   09/16/22 (!) 145.8 kg (321 lb 6.9 oz)   09/01/22 (!) 144.2 kg (317 lb 14.5 oz)   03/16/22 (!) 140 kg (308 lb 10.3 oz)   03/11/22 (!) 141.4 kg (311 lb 11.7 oz)   03/08/22 (!) 143.8 kg (317 lb)   03/04/22 (!) 144 kg (317 lb 7.4 oz)   02/04/21 (!) 141.3 kg (311 lb 8.2 oz)   01/13/21 (!) 141.5 kg " "(311 lb 15.2 oz)     Lab/Procedures/Meds    Pertinent Labs Reviewed: reviewed  Pertinent Labs Comments: BUN (H), Cr (H), eGFR 41  Pertinent Medications Reviewed: reviewed  Pertinent Medications Comments: senna-docusate, polyethylene glycol, Toprol-XL, methlmazole, Abx, aspirin  BMP  Lab Results   Component Value Date     12/18/2023    K 3.8 12/18/2023    CL 98 12/18/2023    CO2 27 12/18/2023    BUN 53 (H) 12/18/2023    CREATININE 1.8 (H) 12/18/2023    CALCIUM 9.7 12/18/2023    ANIONGAP 14 12/18/2023    EGFRNORACEVR 41 (A) 12/18/2023     Lab Results   Component Value Date    ALBUMIN 3.6 12/09/2023     No results for input(s): "POCTGLUCOSE" in the last 24 hours.    Lab Results   Component Value Date    HGBA1C 6.5 (H) 12/10/2023     Lab Results   Component Value Date    WBC 9.15 12/18/2023    HGB 15.6 12/18/2023    HCT 47.6 12/18/2023    MCV 77 (L) 12/18/2023     12/18/2023     Scheduled Meds:   aspirin  81 mg Oral Daily    cetirizine  10 mg Oral Daily    methIMAzole  5 mg Oral Daily    metoprolol succinate  50 mg Oral BID    polyethylene glycol  17 g Oral Daily    senna-docusate 8.6-50 mg  2 tablet Oral BID     Continuous Infusions:   heparin (porcine) in D5W 19 Units/kg/hr (12/18/23 0409)     PRN Meds:.bisacodyL, butamben-TETRAcaine-benzocaine 2%-2%-14% (200 mg/sec), heparin (PORCINE), heparin (PORCINE), ondansetron, sodium chloride 0.9%        Physical Findings/Assessment         Estimated/Assessed Needs    Weight Used For Calorie Calculations: 135.1 kg (297 lb 13.5 oz)  Energy Calorie Requirements (kcal): 2153 kcals (MSJ x no AF (Obese, BMI 41.54)  Energy Need Method: [place]  Protein Requirements: 108-135 g (0.8-1.0 g/kg ABW (GRAHAM, no dialysis)  Weight Used For Protein Calculations: 135.1 kg (297 lb 13.5 oz)  Fluid Requirements (mL): 500 mL + total output  Estimated Fluid Requirement Method: other (see comments)  RDA Method (mL): 2153  CHO Requirement: 269 g (2153 kcals/8)      Nutrition " Prescription Ordered    Current Diet Order: NPO x 4 days    Evaluation of Received Nutrient/Fluid Intake  I/O: (Net since admit)   12/18: -11963.4 mL    Energy Calories Required: not meeting needs  Protein Required: not meeting needs  Fluid Required: not meeting needs  Total Fluid Intake (mL): 348  Total Fluid Output (mL): 1215  Tolerance:  (Currently no intake)  % Intake of Estimated Energy Needs: 0 - 25 %  % Meal Intake: NPO    Nutrition Risk    Level of Risk/Frequency of Follow-up: high (F/u x 2 weekly)     Monitor and Evaluation    Food and Nutrient Intake: energy intake, food and beverage intake  Food and Nutrient Adminstration: diet order  Knowledge/Beliefs/Attitudes: food and nutrition knowledge/skill, beliefs and attitudes  Anthropometric Measurements: weight, weight change, body mass index  Biochemical Data, Medical Tests and Procedures: electrolyte and renal panel, glucose/endocrine profile, inflammatory profile     Nutrition Follow-Up    RD Follow-up?: Yes  Aleja Zuniga, Registration Eligible, Provisional LDN

## 2023-12-18 NOTE — ASSESSMENT & PLAN NOTE
Body mass index is 41.54 kg/m². Morbid obesity complicates all aspects of disease management from diagnostic modalities to treatment. Weight loss encouraged and health benefits explained to patient.

## 2023-12-18 NOTE — SUBJECTIVE & OBJECTIVE
Interval History:  Plan ZOE cardioversion    Review of Systems   Constitutional: Negative for chills, diaphoresis, night sweats, weight gain and weight loss.   HENT:  Negative for congestion, hoarse voice, sore throat and stridor.    Eyes:  Negative for double vision and pain.   Cardiovascular:  Negative for chest pain, claudication, cyanosis, dyspnea on exertion, irregular heartbeat, leg swelling, near-syncope, orthopnea, palpitations, paroxysmal nocturnal dyspnea and syncope.   Respiratory:  Negative for cough, hemoptysis, shortness of breath, sleep disturbances due to breathing, snoring, sputum production and wheezing.    Endocrine: Negative for cold intolerance, heat intolerance and polydipsia.   Hematologic/Lymphatic: Negative for bleeding problem. Does not bruise/bleed easily.   Skin:  Negative for color change, dry skin and rash.   Musculoskeletal:  Negative for joint swelling and muscle cramps.   Gastrointestinal:  Negative for bloating, abdominal pain, constipation, diarrhea, dysphagia, melena, nausea and vomiting.   Genitourinary:  Negative for flank pain and urgency.   Neurological:  Negative for dizziness, focal weakness, headaches, light-headedness, loss of balance, seizures and weakness.   Psychiatric/Behavioral:  Negative for altered mental status and memory loss. The patient is not nervous/anxious.      Objective:     Vital Signs (Most Recent):  Temp: 97.7 °F (36.5 °C) (12/18/23 0738)  Pulse: 81 (12/18/23 0738)  Resp: 18 (12/18/23 0738)  BP: (!) 88/53 (12/18/23 0738)  SpO2: 95 % (12/18/23 0738) Vital Signs (24h Range):  Temp:  [97.6 °F (36.4 °C)-99.3 °F (37.4 °C)] 97.7 °F (36.5 °C)  Pulse:  [69-97] 81  Resp:  [18-20] 18  SpO2:  [94 %-98 %] 95 %  BP: ()/(53-78) 88/53     Weight: 135.1 kg (297 lb 13.5 oz)  Body mass index is 41.54 kg/m².     SpO2: 95 %         Intake/Output Summary (Last 24 hours) at 12/18/2023 1037  Last data filed at 12/18/2023 0437  Gross per 24 hour   Intake 120 ml   Output  975 ml   Net -855 ml       Lines/Drains/Airways       Peripheral Intravenous Line  Duration                  Midline Catheter Insertion/Assessment  - Single Lumen 12/15/23 2231 Left basilic vein (medial side of arm) other (see comments) 2 days                       Physical Exam  Eyes:      Pupils: Pupils are equal, round, and reactive to light.   Neck:      Trachea: No tracheal deviation.   Cardiovascular:      Rate and Rhythm: Normal rate and regular rhythm.      Pulses: Intact distal pulses.           Carotid pulses are 2+ on the right side and 2+ on the left side.       Radial pulses are 2+ on the right side and 2+ on the left side.        Femoral pulses are 2+ on the right side and 2+ on the left side.       Popliteal pulses are 2+ on the right side and 2+ on the left side.        Dorsalis pedis pulses are 2+ on the right side and 2+ on the left side.        Posterior tibial pulses are 2+ on the right side and 2+ on the left side.      Heart sounds: Normal heart sounds. No murmur heard.     No friction rub. No gallop.   Pulmonary:      Effort: Pulmonary effort is normal. No respiratory distress.      Breath sounds: Normal breath sounds. No stridor. No wheezing or rales.   Chest:      Chest wall: No tenderness.   Abdominal:      General: There is no distension.      Tenderness: There is no abdominal tenderness. There is no rebound.   Musculoskeletal:         General: No tenderness.      Cervical back: Normal range of motion.   Skin:     General: Skin is warm and dry.   Neurological:      Mental Status: He is alert and oriented to person, place, and time.            Significant Labs: BMP:   Recent Labs   Lab 12/17/23  0609 12/18/23  0535   * 110    139   K 3.8 3.8   CL 97 98   CO2 26 27   BUN 47* 53*   CREATININE 1.9* 1.8*   CALCIUM 9.6 9.7   MG 2.3 2.3       Significant Imaging: Echocardiogram: Transthoracic echo (TTE) complete ([facility] Only):   Results for orders placed or performed during the  hospital encounter of 12/09/23   Echo   Result Value Ref Range    BSA 2.62 m2    LVOT stroke volume 44.74 cm3    LVIDd 5.31 3.5 - 6.0 cm    LV Systolic Volume 81.84 mL    LV Systolic Volume Index 32.5 mL/m2    LVIDs 4.27 (A) 2.1 - 4.0 cm    LV Diastolic Volume 135.86 mL    LV Diastolic Volume Index 53.91 mL/m2    IVS 1.28 (A) 0.6 - 1.1 cm    LVOT diameter 2.07 cm    LVOT area 3.4 cm2    FS 20 (A) 28 - 44 %    Left Ventricle Relative Wall Thickness 0.49 cm    Posterior Wall 1.29 (A) 0.6 - 1.1 cm    LV mass 283.13 g    LV Mass Index 112 g/m2    MV Peak E Ishmael 1.08 m/s    TDI LATERAL 0.11 m/s    TDI SEPTAL 0.08 m/s    E/E' ratio 11.37 m/s    MV Peak A Ishmael 0.23 m/s    TR Max Ishmael 2.78 m/s    E/A ratio 4.70     IVRT 31.40 msec    E wave deceleration time 167.06 msec    LV SEPTAL E/E' RATIO 13.50 m/s    LV LATERAL E/E' RATIO 9.82 m/s    LVOT peak ishmael 0.68 m/s    Left Ventricular Outflow Tract Mean Velocity 0.44 cm/s    Left Ventricular Outflow Tract Mean Gradient 0.91 mmHg    RV mid diameter 4.02 cm    RVOT peak VTI 9.8 cm    TAPSE 1.70 cm    LA size 5.35 cm    Left Atrium Major Axis 6.56 cm    RA Major Axis 5.76 cm    AV mean gradient 3 mmHg    AV peak gradient 4 mmHg    Ao peak ishmael 1.04 m/s    Ao VTI 17.90 cm    LVOT peak VTI 13.30 cm    AV valve area 2.50 cm²    AV Velocity Ratio 0.65     AV index (prosthetic) 0.74     MARILYN by Velocity Ratio 2.20 cm²    Mr max ishmael 4.52 m/s    MV stenosis pressure 1/2 time 48.45 ms    MV valve area p 1/2 method 4.54 cm2    Triscuspid Valve Regurgitation Peak Gradient 31 mmHg    PV mean gradient 1 mmHg    RVOT peak ishmael 0.56 m/s    Ao root annulus 3.22 cm    STJ 3.20 cm    Ascending aorta 3.27 cm    IVC diameter 1.93 cm    Mean e' 0.10 m/s    ZLVIDS -5.62     ZLVIDD -10.17     LA WIDTH 4.7 cm    RA Width 4.4 cm    EF 40 %    TV resting pulmonary artery pressure 39 mmHg    RV TB RVSP 11 mmHg    Est. RA pres 8 mmHg    Narrative      Left Ventricle: The left ventricle is normal in size. Normal  wall   thickness. There is concentric remodeling. Mild global hyperkinesis   present. There is reduced systolic function. Ejection fraction by visual   approximation is 40%. There is normal diastolic function.    Right Ventricle: Moderate right ventricular enlargement. Wall thickness   is normal. Systolic function is moderately reduced.    Mitral Valve: There is mild regurgitation.    Tricuspid Valve: There is moderate regurgitation.    Pulmonary Artery: The estimated pulmonary artery systolic pressure is   39 mmHg.    IVC/SVC: Intermediate venous pressure at 8 mmHg.

## 2023-12-18 NOTE — ASSESSMENT & PLAN NOTE
Chronic, controlled. Latest blood pressure and vitals reviewed-     Temp:  [97.4 °F (36.3 °C)-99.3 °F (37.4 °C)]   Pulse:  [69-97]   Resp:  [15-20]   BP: ()/(53-78)   SpO2:  [94 %-99 %] .   Home meds for hypertension were reviewed and noted below.   Hypertension Medications               amLODIPine (NORVASC) 10 MG tablet Take 1 tablet (10 mg total) by mouth once daily.    furosemide (LASIX) 40 MG tablet Take 1 tablet (40 mg total) by mouth once daily.    lisinopriL (PRINIVIL,ZESTRIL) 20 MG tablet Take 1 tablet (20 mg total) by mouth once daily.    metoprolol succinate (TOPROL-XL) 50 MG 24 hr tablet Take 1 tablet (50 mg total) by mouth once daily.          While in the hospital, will manage blood pressure as follows;BP marginal will hold    Will utilize p.r.n. blood pressure medication only if patient's blood pressure greater than 160/100 and he develops symptoms such as worsening chest pain or shortness of breath.

## 2023-12-18 NOTE — ASSESSMENT & PLAN NOTE
Remains in atrial flutter, rate controlled with Toprol  Continue heparin infusion  Planned for ZOE/cardioversion 12/18

## 2023-12-18 NOTE — PLAN OF CARE
Discussed poc with pt, verbalized understanding     Purposeful rounding every 2hours    VS wnl  Cardiac monitoring in use  Fall precautions in place, remains injury free  Pt denies c/o n/v and pain    Heparin infusing  Accurate I&Os  Abx given as prescribed  Bed locked at lowest position  Call light within reach    Chart check complete  Will cont with POC    Problem: Adult Inpatient Plan of Care  Goal: Plan of Care Review  Outcome: Ongoing, Progressing  Goal: Patient-Specific Goal (Individualized)  Outcome: Ongoing, Progressing  Goal: Absence of Hospital-Acquired Illness or Injury  Outcome: Ongoing, Progressing  Goal: Optimal Comfort and Wellbeing  Outcome: Ongoing, Progressing  Goal: Readiness for Transition of Care  Outcome: Ongoing, Progressing     Problem: Bariatric Environmental Safety  Goal: Safety Maintained with Care  Outcome: Ongoing, Progressing     Problem: Diabetes Comorbidity  Goal: Blood Glucose Level Within Targeted Range  Outcome: Ongoing, Progressing     Problem: Skin Injury Risk Increased  Goal: Skin Health and Integrity  Outcome: Ongoing, Progressing     Problem: Fall Injury Risk  Goal: Absence of Fall and Fall-Related Injury  Outcome: Ongoing, Progressing     Problem: Infection  Goal: Absence of Infection Signs and Symptoms  Outcome: Ongoing, Progressing     Problem: Fluid and Electrolyte Imbalance (Acute Kidney Injury/Impairment)  Goal: Fluid and Electrolyte Balance  Outcome: Ongoing, Progressing     Problem: Oral Intake Inadequate (Acute Kidney Injury/Impairment)  Goal: Optimal Nutrition Intake  Outcome: Ongoing, Progressing     Problem: Renal Function Impairment (Acute Kidney Injury/Impairment)  Goal: Effective Renal Function  Outcome: Ongoing, Progressing     Problem: Dysrhythmia  Goal: Normalized Cardiac Rhythm  Outcome: Ongoing, Progressing

## 2023-12-18 NOTE — ANESTHESIA POSTPROCEDURE EVALUATION
Anesthesia Post Evaluation    Patient: Roland Ng    Procedure(s) Performed: Procedure(s) (LRB):  Transesophageal echo (ZOE) intra-procedure log documentation (N/A)    Final Anesthesia Type: MAC      Patient location during evaluation: Cath Lab  Patient participation: Yes- Able to Participate  Level of consciousness: awake and alert  Post-procedure vital signs: reviewed and stable  Pain management: adequate  Airway patency: patent    PONV status at discharge: No PONV  Anesthetic complications: no      Cardiovascular status: blood pressure returned to baseline, hemodynamically stable and stable  Respiratory status: unassisted, room air and spontaneous ventilation  Hydration status: euvolemic  Follow-up not needed.              Vitals Value Taken Time   /59 12/18/23 1200   Temp 36.6 °C (97.9 °F) 12/18/23 1130   Pulse 82 12/18/23 1200   Resp 15 12/18/23 1200   SpO2 98 % 12/18/23 1200         No case tracking events are documented in the log.      Pain/Samantha Score: Samantha Score: 10 (12/18/2023 11:40 AM)

## 2023-12-18 NOTE — PROGRESS NOTES
[facility]  Cardiology  Progress Note    Patient Name: Roland Ng  MRN: [id number]  Admission Date: 12/9/2023  Hospital Length of Stay: 8 days  Code Status: Full Code   Attending Physician: Verena Kothari MD   Primary Care Physician: Janis Green NP  Expected Discharge Date:   Principal Problem:Atrial flutter    Subjective:     Hospital Course:   12/11/23-Patient seen and examined today, sitting up in bed. Feels ok. Remains SOB, needs continued IV diuresis. HR controlled. Labs reviewed.     12/12/23-Patient seen and examined today, sitting up in bed. Diuresing well. SOB improving but still significantly overloaded. HR variable. Labs reviewed. Creatinine 1.0, K 3.4. Needs ZOE/DCCV once compensated.    12/13/23-Patient seen and examined today, sitting up in bedside chair. Diuresing well. States he was able to ambulate down the hallway today. Still overloaded, continue diuretics. Labs reviewed/stable.    12/14/23-Patient seen and examined today, resting in bed. Feels ok. Diuresing well. SOB/edema improving. Creatinine stable. Plans for ZOE/DCCV once compensated.     12/15/23-Patient seen and examined today, sitting up in bedside chair. Continues to improve. Starting to get wrinkles in BLE. SOB better. Creatinine 1.3, BUN up-trending.    12/18/2023 patient is stable plan for ZOE cardioversion    Interval History:  Plan ZOE cardioversion    Review of Systems   Constitutional: Negative for chills, diaphoresis, night sweats, weight gain and weight loss.   HENT:  Negative for congestion, hoarse voice, sore throat and stridor.    Eyes:  Negative for double vision and pain.   Cardiovascular:  Negative for chest pain, claudication, cyanosis, dyspnea on exertion, irregular heartbeat, leg swelling, near-syncope, orthopnea, palpitations, paroxysmal nocturnal dyspnea and syncope.   Respiratory:  Negative for cough, hemoptysis, shortness of breath, sleep disturbances due to breathing, snoring, sputum  production and wheezing.    Endocrine: Negative for cold intolerance, heat intolerance and polydipsia.   Hematologic/Lymphatic: Negative for bleeding problem. Does not bruise/bleed easily.   Skin:  Negative for color change, dry skin and rash.   Musculoskeletal:  Negative for joint swelling and muscle cramps.   Gastrointestinal:  Negative for bloating, abdominal pain, constipation, diarrhea, dysphagia, melena, nausea and vomiting.   Genitourinary:  Negative for flank pain and urgency.   Neurological:  Negative for dizziness, focal weakness, headaches, light-headedness, loss of balance, seizures and weakness.   Psychiatric/Behavioral:  Negative for altered mental status and memory loss. The patient is not nervous/anxious.      Objective:     Vital Signs (Most Recent):  Temp: 97.7 °F (36.5 °C) (12/18/23 0738)  Pulse: 81 (12/18/23 0738)  Resp: 18 (12/18/23 0738)  BP: (!) 88/53 (12/18/23 0738)  SpO2: 95 % (12/18/23 0738) Vital Signs (24h Range):  Temp:  [97.6 °F (36.4 °C)-99.3 °F (37.4 °C)] 97.7 °F (36.5 °C)  Pulse:  [69-97] 81  Resp:  [18-20] 18  SpO2:  [94 %-98 %] 95 %  BP: ()/(53-78) 88/53     Weight: 135.1 kg (297 lb 13.5 oz)  Body mass index is 41.54 kg/m².     SpO2: 95 %         Intake/Output Summary (Last 24 hours) at 12/18/2023 1037  Last data filed at 12/18/2023 0437  Gross per 24 hour   Intake 120 ml   Output 975 ml   Net -855 ml       Lines/Drains/Airways       Peripheral Intravenous Line  Duration                  Midline Catheter Insertion/Assessment  - Single Lumen 12/15/23 2231 Left basilic vein (medial side of arm) other (see comments) 2 days                       Physical Exam  Eyes:      Pupils: Pupils are equal, round, and reactive to light.   Neck:      Trachea: No tracheal deviation.   Cardiovascular:      Rate and Rhythm: Normal rate and regular rhythm.      Pulses: Intact distal pulses.           Carotid pulses are 2+ on the right side and 2+ on the left side.       Radial pulses are 2+ on  the right side and 2+ on the left side.        Femoral pulses are 2+ on the right side and 2+ on the left side.       Popliteal pulses are 2+ on the right side and 2+ on the left side.        Dorsalis pedis pulses are 2+ on the right side and 2+ on the left side.        Posterior tibial pulses are 2+ on the right side and 2+ on the left side.      Heart sounds: Normal heart sounds. No murmur heard.     No friction rub. No gallop.   Pulmonary:      Effort: Pulmonary effort is normal. No respiratory distress.      Breath sounds: Normal breath sounds. No stridor. No wheezing or rales.   Chest:      Chest wall: No tenderness.   Abdominal:      General: There is no distension.      Tenderness: There is no abdominal tenderness. There is no rebound.   Musculoskeletal:         General: No tenderness.      Cervical back: Normal range of motion.   Skin:     General: Skin is warm and dry.   Neurological:      Mental Status: He is alert and oriented to person, place, and time.            Significant Labs: BMP:   Recent Labs   Lab 12/17/23  0609 12/18/23  0535   * 110    139   K 3.8 3.8   CL 97 98   CO2 26 27   BUN 47* 53*   CREATININE 1.9* 1.8*   CALCIUM 9.6 9.7   MG 2.3 2.3       Significant Imaging: Echocardiogram: Transthoracic echo (TTE) complete ([facility]upid Only):   Results for orders placed or performed during the hospital encounter of 12/09/23   Echo   Result Value Ref Range    BSA 2.62 m2    LVOT stroke volume 44.74 cm3    LVIDd 5.31 3.5 - 6.0 cm    LV Systolic Volume 81.84 mL    LV Systolic Volume Index 32.5 mL/m2    LVIDs 4.27 (A) 2.1 - 4.0 cm    LV Diastolic Volume 135.86 mL    LV Diastolic Volume Index 53.91 mL/m2    IVS 1.28 (A) 0.6 - 1.1 cm    LVOT diameter 2.07 cm    LVOT area 3.4 cm2    FS 20 (A) 28 - 44 %    Left Ventricle Relative Wall Thickness 0.49 cm    Posterior Wall 1.29 (A) 0.6 - 1.1 cm    LV mass 283.13 g    LV Mass Index 112 g/m2    MV Peak E Ishmael 1.08 m/s    TDI LATERAL 0.11 m/s    TDI SEPTAL  0.08 m/s    E/E' ratio 11.37 m/s    MV Peak A Ishmael 0.23 m/s    TR Max Ishmael 2.78 m/s    E/A ratio 4.70     IVRT 31.40 msec    E wave deceleration time 167.06 msec    LV SEPTAL E/E' RATIO 13.50 m/s    LV LATERAL E/E' RATIO 9.82 m/s    LVOT peak ishmael 0.68 m/s    Left Ventricular Outflow Tract Mean Velocity 0.44 cm/s    Left Ventricular Outflow Tract Mean Gradient 0.91 mmHg    RV mid diameter 4.02 cm    RVOT peak VTI 9.8 cm    TAPSE 1.70 cm    LA size 5.35 cm    Left Atrium Major Axis 6.56 cm    RA Major Axis 5.76 cm    AV mean gradient 3 mmHg    AV peak gradient 4 mmHg    Ao peak ishmael 1.04 m/s    Ao VTI 17.90 cm    LVOT peak VTI 13.30 cm    AV valve area 2.50 cm²    AV Velocity Ratio 0.65     AV index (prosthetic) 0.74     MARILYN by Velocity Ratio 2.20 cm²    Mr max ishmael 4.52 m/s    MV stenosis pressure 1/2 time 48.45 ms    MV valve area p 1/2 method 4.54 cm2    Triscuspid Valve Regurgitation Peak Gradient 31 mmHg    PV mean gradient 1 mmHg    RVOT peak ishmael 0.56 m/s    Ao root annulus 3.22 cm    STJ 3.20 cm    Ascending aorta 3.27 cm    IVC diameter 1.93 cm    Mean e' 0.10 m/s    ZLVIDS -5.62     ZLVIDD -10.17     LA WIDTH 4.7 cm    RA Width 4.4 cm    EF 40 %    TV resting pulmonary artery pressure 39 mmHg    RV TB RVSP 11 mmHg    Est. RA pres 8 mmHg    Narrative      Left Ventricle: The left ventricle is normal in size. Normal wall   thickness. There is concentric remodeling. Mild global hyperkinesis   present. There is reduced systolic function. Ejection fraction by visual   approximation is 40%. There is normal diastolic function.    Right Ventricle: Moderate right ventricular enlargement. Wall thickness   is normal. Systolic function is moderately reduced.    Mitral Valve: There is mild regurgitation.    Tricuspid Valve: There is moderate regurgitation.    Pulmonary Artery: The estimated pulmonary artery systolic pressure is   39 mmHg.    IVC/SVC: Intermediate venous pressure at 8 mmHg.       Assessment and Plan:     Brief  HPI: ZOE CV     * Atrial flutter  Rate controlled with toprol xl 50 bid  Once euvolemic, if still symptomatic with SOB, will consider ZOE/Cardioversion vs EP evaluation for flutter/fib ablation    12/11/23  -Continue Toprol XL  -Continue heparin gtt  -Keep NPO after MN for possible ZOE/DCCV tmw    12/12/23  -Still volume overloaded, continue IV diuresis  -Continue BB, heparin gtt  -ZOE/DCCV once compensated    12/13/23  -See above    Lower extremity edema  -Assess response to IV diuresis  -CVI also contributing    Paroxysmal A-fib    -Continue anticoagulation and toprol xl    12/18/2023 and ZOE cardioversion    Morbid obesity with body mass index (BMI) of 40.0 to 44.9 in adult  -weight loss    Hypertension  Titrate medications     Acute exacerbation of CHF (congestive heart failure)  New onset CHF with EF 40%  Needs ischemia evaluation once euvolemic  Monitor I/Os  Continue IV diuresis     12/11/23  -Still overloaded  -Continue IV diuresis  -Continue ACEi, BB  -Strict I's/O's  -Ischemic workup once volume status improved    12/12/23  -Needs continued IV diuresis  -Continue BB, ACEi  -ZOE/DCCV and ischemic workup once compensated    12/13/23  -See above, needs continued IV diuresis    12/14/23  -Continue IV diuresis     12/15/23  -Improving, continued IV diuresis         VTE Risk Mitigation (From admission, onward)           Ordered     heparin 25,000 units in dextrose 5% 250 mL (100 units/mL) infusion LOW INTENSITY nomogram - [facility]  Continuous        Question:  Begin at (units/kg/hr)  Answer:  12    12/10/23 1752     heparin 25,000 units in dextrose 5% (100 units/ml) IV bolus from bag - ADDITIONAL PRN BOLUS - 60 units/kg  As needed (PRN)        Question:  Heparin Infusion Adjustment (DO NOT MODIFY ANSWER)  Answer:  [email or web address]\epic\Images\Pharmacy\HeparinInfusions\heparin LOW INTENSITY nomogram for [facility] [id number]    12/10/23 1752     heparin 25,000 units in dextrose 5% (100 units/ml) IV bolus from bag - ADDITIONAL  PRN BOLUS - 30 units/kg  As needed (PRN)        Question:  Heparin Infusion Adjustment (DO NOT MODIFY ANSWER)  Answer:  [email or web address]\epic\Images\Pharmacy\HeparinInfusions\heparin LOW INTENSITY nomogram for [facility] [id number]    12/10/23 1752     Reason for No Pharmacological VTE Prophylaxis  Once        Question:  Reasons:  Answer:  Already adequately anticoagulated on oral Anticoagulants    12/10/23 0102     IP VTE HIGH RISK PATIENT  Once         12/10/23 0102     Place sequential compression device  Until discontinued         12/10/23 0102                    Juancarlos Edwards MD  Cardiology  [facility]

## 2023-12-18 NOTE — PROCEDURES
"Roland Ng is a 68 y.o. male patient.    Temp: 97.7 °F (36.5 °C) (12/18/23 0738)  Pulse: 88 (12/18/23 0909)  Resp: 18 (12/18/23 0738)  BP: (!) 88/53 (12/18/23 0738)  SpO2: 95 % (12/18/23 0738)  Weight: 135.1 kg (297 lb 13.5 oz) (12/18/23 0438)  Height: 5' 11" (180.3 cm) (12/10/23 1424)       Electrical Cardioversion    Date/Time: 12/18/2023 11:12 AM  Location procedure was performed: [facility]    Performed by: Juancarlos Edwards MD  Authorized by: Juancarlos Edwards MD  Consent Done: Yes  Consent: Written consent obtained.  Risks and benefits: risks, benefits and alternatives were discussed  Consent given by: patient  Patient understanding: patient states understanding of the procedure being performed  Patient consent: the patient's understanding of the procedure matches consent given  Procedure consent: procedure consent matches procedure scheduled  Relevant documents: relevant documents present and verified  Test results: test results available and properly labeled  Site marked: the operative site was marked  Imaging studies: imaging studies available    Patient sedated: yes  Sedation type: moderate (conscious) sedation    Cardioversion basis: elective  Indications: failure of anti-arrhythmic medications  Pre-procedure rhythm: atrial flutter  Patient position: patient was placed in a supine position  Chest area: chest area exposed  Electrodes: pads  Electrodes placed: anterior-posterior  Number of attempts: 1  Attempt 1 mode: synchronous  Attempt 1 shock (in Joules): 100  Attempt 1 outcome: conversion to normal sinus rhythm  Post-procedure rhythm: sinus tachycardia  Complications: no complications  Patient tolerance: Patient tolerated the procedure well with no immediate complications  Complications: No          12/18/2023    "

## 2023-12-18 NOTE — ASSESSMENT & PLAN NOTE
"Patient is identified as having Diastolic (HFpEF) heart failure that is Acute on chronic. CHF is currently uncontrolled due to Continued edema of extremities and Dyspnea not returned to baseline after single doses of IV diuretic. Latest ECHO performed and demonstrates- Results for orders placed during the hospital encounter of 12/10/23    Echo    Interpretation Summary    Left Ventricle: The left ventricle is normal in size. Normal wall thickness. There is concentric remodeling. Mild global hyperkinesis present. There is reduced systolic function. Ejection fraction by visual approximation is 40%. There is normal diastolic function.    Right Ventricle: Moderate right ventricular enlargement. Wall thickness is normal. Systolic function is moderately reduced.    Mitral Valve: There is mild regurgitation.    Tricuspid Valve: There is moderate regurgitation.    Pulmonary Artery: The estimated pulmonary artery systolic pressure is 39 mmHg.    IVC/SVC: Intermediate venous pressure at 8 mmHg.  Continue Beta Blocker, ACE/ARB, and Furosemide and monitor clinical status closely. Monitor on telemetry. Patient is on CHF pathway.  Monitor strict Is&Os and daily weights.  Place on fluid restriction of 1.5 L. Cardiology has been consulted. Continue to stress to patient importance of self efficacy and  on diet for CHF. Last BNP reviewed- and noted below   No results for input(s): "BNP", "BNPTRIAGEBLO" in the last 168 hours.    -telemetry  -appears euvolemic, slightly dry IV diuretics discontinued.  Resume p.o. diuretic as appropriate  -bipap qhs prn    "

## 2023-12-18 NOTE — ASSESSMENT & PLAN NOTE
Patient with Paroxysmal (<7 days) atrial fibrillation which is uncontrolled currently with Beta Blocker and Calcium Channel Blocker. Patient is currently in atrial fibrillation.[id number] Score: 3. Anticoagulation indicated. Anticoagulation done with Eliquis .

## 2023-12-18 NOTE — PLAN OF CARE
Nutrition Recommendations 12/18:  1. Recommend advance pt's diet to Diabetic 2000 calorie, Cardiac diet   2. If unable to advance pt's diet from NPO by day 5, recommend re-consult for alternative means of nutrition   3. Weigh twice weekly  4. Collaboration with other providers     Goals:   1. Pt's diet will advance within 24 hrs   2. Pt will tolerate and intake > 75% EEN and EPN prior to RD follow up  3. Pt will maintain a stable weight by RD follow up    Aleja Zuniga, Registration Eligible, Provisional LDN

## 2023-12-18 NOTE — PROGRESS NOTES
[facility] Medicine  Progress Note    Patient Name: Roland Ng  MRN: [id number]  Patient Class: IP- Inpatient   Admission Date: 12/9/2023  Length of Stay: 8 days  Attending Physician: Verena Kothari MD  Primary Care Provider: Janis Green NP        Subjective:     Principal Problem:Atrial flutter        HPI:  Roland Ng is a 68 y.o. male with a PMH  has a past medical history of CAD (coronary artery disease), Chronic congestive heart failure (01/13/2021), Diabetes mellitus with no complication, Hypertension associated with diabetes, Hyperthyroidism (01/13/2021), Morbid obesity with body mass index (BMI) of 40.0 to 44.9 in adult, and Paroxysmal A-fib. who presented to the ED for further evaluation of progressively worsening dyspnea/shortness a breath was well as bilateral lower extremity, scrotal, and penile swelling/edema.  Patient reports significant history of heart failure and was previously followed by Dr. Decker but stated he is not seen him in quite some time.  Patient also reported being off his home medications for approximately 3-4 months prior to getting back on them due to insurance related issues and has been compliant over the past 3 months.  Patient also reports non adherence to fluid/dietary restrictions and states he drinks approximately 20 bottles of water daily.  He reported no known alleviating or aggravating factors noted and reported associated symptoms included dyspnea both at rest and on exertion, orthopnea, PND, and wheezing in addition to reported swelling as noted above.  Patient reports negative history of asthma/COPD, does not use home oxygen, and does not use home CPAP.  All other review of systems negative except as noted above.  Initial workup in the ED concerning for signs/symptoms of acute CHF exacerbation with patient being admitted to Hospital Medicine inpatient for continued medical management.      PCP: Janis Green  N.      Overview/Hospital Course:  Cardiology consulted.  Patient currently being treated for CHF exacerbation with IV diuretics.  Given persistent atrial flutter, planned for DANIELLE/cardioversion once euvolemic.    PT/OT recommends moderate intensity therapy on discharge. SW consulted for SNF placement.  Has been accepted at [facility], insurance authorization obtained.    Tentatively planned for danielle/cardioversion on Monday 12/18/2023.    Of note, patient recorded to have multiple instances of heart rate in the 40s on EMR.  However telemetry review did not show any bradycardia, patient's heart rate has been staying mostly in the 80s and occasionally goes up over 100s for short period of time.    Hospital course complicated by GRAHAM likely iatrogenic from diuresis.  Diuretics held    Interval History:  Patient seen and examined with family at bedside.  Discussed with Cardiology.  Patient underwent cardioversion without complications.  Plan is to go to [facility] sniff in the morning.    Review of Systems   Constitutional:  Positive for fatigue. Negative for fever.   Respiratory:  Negative for cough, chest tightness and shortness of breath.    Cardiovascular:  Positive for palpitations and leg swelling. Negative for chest pain.   Neurological:  Positive for weakness.   All other systems reviewed and are negative.    Objective:     Vital Signs (Most Recent):  Temp: 98 °F (36.7 °C) (12/18/23 1702)  Pulse: 83 (12/18/23 1702)  Resp: 15 (12/18/23 1702)  BP: (!) 131/59 (12/18/23 1702)  SpO2: 99 % (12/18/23 1702) Vital Signs (24h Range):  Temp:  [97.4 °F (36.3 °C)-99.3 °F (37.4 °C)] 98 °F (36.7 °C)  Pulse:  [69-97] 83  Resp:  [15-20] 15  SpO2:  [94 %-99 %] 99 %  BP: ()/(53-78) 131/59     Weight: 135.1 kg (297 lb 13.5 oz)  Body mass index is 41.54 kg/m².    Intake/Output Summary (Last 24 hours) at 12/18/2023 1720  Last data filed at 12/18/2023 1116  Gross per 24 hour   Intake 420 ml   Output 975 ml   Net -555 ml          Physical Exam  Vitals reviewed.   Constitutional:       Appearance: Normal appearance. He is obese.   HENT:      Head: Normocephalic and atraumatic.      Mouth/Throat:      Mouth: Mucous membranes are moist.      Pharynx: Oropharynx is clear.   Eyes:      Extraocular Movements: Extraocular movements intact.      Conjunctiva/sclera: Conjunctivae normal.   Cardiovascular:      Rate and Rhythm: Normal rate and regular rhythm.      Pulses: Normal pulses.      Heart sounds: Normal heart sounds.   Pulmonary:      Effort: Pulmonary effort is normal.      Breath sounds: Normal breath sounds.   Abdominal:      General: Bowel sounds are normal.      Palpations: Abdomen is soft.   Musculoskeletal:         General: Normal range of motion.      Cervical back: Normal range of motion and neck supple.      Right lower leg: Edema present.      Left lower leg: Edema present.      Comments: Trace lower extremity pitting edema   Skin:     General: Skin is warm and dry.   Neurological:      Mental Status: He is alert and oriented to person, place, and time. Mental status is at baseline.   Psychiatric:         Mood and Affect: Mood normal.         Behavior: Behavior normal.         Thought Content: Thought content normal.             Significant Labs: All pertinent labs within the past 24 hours have been reviewed.  CBC:   Recent Labs   Lab 12/17/23  0609 12/18/23  0535   WBC 9.68 9.15   HGB 15.3 15.6   HCT 45.2 47.6    218     CMP:   Recent Labs   Lab 12/17/23  0609 12/18/23  0535    139   K 3.8 3.8   CL 97 98   CO2 26 27   * 110   BUN 47* 53*   CREATININE 1.9* 1.8*   CALCIUM 9.6 9.7   ANIONGAP 14 14     Magnesium:   Recent Labs   Lab 12/17/23  0609 12/18/23  0535   MG 2.3 2.3     TSH:   Recent Labs   Lab 12/10/23  0240   TSH 0.083*       Significant Imaging: I have reviewed all pertinent imaging results/findings within the past 24 hours.    Assessment/Plan:      * Atrial flutter  Remains in atrial flutter, rate  controlled with Toprol  Continue heparin infusion  Planned for ZOE/cardioversion 12/18      GRAHAM (acute kidney injury)  Patient with acute kidney injury/acute renal failure likely due to pre-renal azotemia due to IVVD GRAHAM is currently worsening. Baseline creatinine  1  - Labs reviewed- Renal function/electrolytes with Estimated Creatinine Clearance: 56.6 mL/min (A) (based on SCr of 1.8 mg/dL (H)). according to latest data. Monitor urine output and serial BMP and adjust therapy as needed. Avoid nephrotoxins and renally dose meds for GFR listed above.    Continue to hold diuretics   Monitor renal function  Monitor Is&Os    Lymphedema  Referral to Lymphedema clinic outpatient      Scrotal swelling  Ultrasound showed large bilateral hydrocele with extensive scrotal wall thickening or edema, no abscess   Likely secondary to excessive volume in setting of CHF exacerbation      Lower extremity edema  Multifactorial secondary to lymphedema and CHF exacerbation   Wound care consulted, recommended outpatient referral to lymphedema clinic  Improved with diuresis      SOB (shortness of breath)  In setting of CHF exacerbation  Resolved    Paroxysmal A-fib  Patient with Paroxysmal (<7 days) atrial fibrillation which is uncontrolled currently with Beta Blocker and Calcium Channel Blocker. Patient is currently in atrial fibrillation.[id number] Score: 3. Anticoagulation indicated. Anticoagulation done with Eliquis .      Atherosclerosis of native coronary artery of native heart without angina pectoris  Patient with known CAD s/p  LHC without stent placement , which is controlled Will continue  Eliquis, ASA, and Statin and monitor for S/Sx of angina/ACS. Continue to monitor on telemetry.       Morbid obesity with body mass index (BMI) of 40.0 to 44.9 in adult  Body mass index is 41.54 kg/m². Morbid obesity complicates all aspects of disease management from diagnostic modalities to treatment. Weight loss encouraged and health benefits  explained to patient.       Hypertension  Chronic, controlled. Latest blood pressure and vitals reviewed-     Temp:  [97.4 °F (36.3 °C)-99.3 °F (37.4 °C)]   Pulse:  [69-97]   Resp:  [15-20]   BP: ()/(53-78)   SpO2:  [94 %-99 %] .   Home meds for hypertension were reviewed and noted below.   Hypertension Medications               amLODIPine (NORVASC) 10 MG tablet Take 1 tablet (10 mg total) by mouth once daily.    furosemide (LASIX) 40 MG tablet Take 1 tablet (40 mg total) by mouth once daily.    lisinopriL (PRINIVIL,ZESTRIL) 20 MG tablet Take 1 tablet (20 mg total) by mouth once daily.    metoprolol succinate (TOPROL-XL) 50 MG 24 hr tablet Take 1 tablet (50 mg total) by mouth once daily.          While in the hospital, will manage blood pressure as follows;BP marginal will hold    Will utilize p.r.n. blood pressure medication only if patient's blood pressure greater than 160/100 and he develops symptoms such as worsening chest pain or shortness of breath.      Acute exacerbation of CHF (congestive heart failure)  Patient is identified as having Diastolic (HFpEF) heart failure that is Acute on chronic. CHF is currently uncontrolled due to Continued edema of extremities and Dyspnea not returned to baseline after single doses of IV diuretic. Latest ECHO performed and demonstrates- Results for orders placed during the hospital encounter of 12/10/23    Echo    Interpretation Summary    Left Ventricle: The left ventricle is normal in size. Normal wall thickness. There is concentric remodeling. Mild global hyperkinesis present. There is reduced systolic function. Ejection fraction by visual approximation is 40%. There is normal diastolic function.    Right Ventricle: Moderate right ventricular enlargement. Wall thickness is normal. Systolic function is moderately reduced.    Mitral Valve: There is mild regurgitation.    Tricuspid Valve: There is moderate regurgitation.    Pulmonary Artery: The estimated pulmonary  "artery systolic pressure is 39 mmHg.    IVC/SVC: Intermediate venous pressure at 8 mmHg.  Continue Beta Blocker, ACE/ARB, and Furosemide and monitor clinical status closely. Monitor on telemetry. Patient is on CHF pathway.  Monitor strict Is&Os and daily weights.  Place on fluid restriction of 1.5 L. Cardiology has been consulted. Continue to stress to patient importance of self efficacy and  on diet for CHF. Last BNP reviewed- and noted below   No results for input(s): "BNP", "BNPTRIAGEBLO" in the last 168 hours.    -telemetry  -appears euvolemic, slightly dry IV diuretics discontinued.  Resume p.o. diuretic as appropriate  -bipap qhs prn      Hyperthyroidism  Patient has chronic hyperthyroidism. TFTs reviewed-   Lab Results   Component Value Date    TSH 0.083 (L) 12/10/2023     Although TSH is slightly low, free T4 is within normal limits  Will continue home methimazole  Outpatient follow-up for further monitoring and medication titration as needed      VTE Risk Mitigation (From admission, onward)           Ordered     heparin 25,000 units in dextrose 5% 250 mL (100 units/mL) infusion LOW INTENSITY nomogram - [facility]  Continuous        Question:  Begin at (units/kg/hr)  Answer:  12    12/10/23 1752     heparin 25,000 units in dextrose 5% (100 units/ml) IV bolus from bag - ADDITIONAL PRN BOLUS - 60 units/kg  As needed (PRN)        Question:  Heparin Infusion Adjustment (DO NOT MODIFY ANSWER)  Answer:  \[email or web address]\epic\Images\Pharmacy\HeparinInfusions\heparin LOW INTENSITY nomogram for [facility] [id number]    12/10/23 1752     heparin 25,000 units in dextrose 5% (100 units/ml) IV bolus from bag - ADDITIONAL PRN BOLUS - 30 units/kg  As needed (PRN)        Question:  Heparin Infusion Adjustment (DO NOT MODIFY ANSWER)  Answer:  \[email or web address]\epic\Images\Pharmacy\HeparinInfusions\heparin LOW INTENSITY nomogram for [facility] [id number]    12/10/23 1752     Reason for No Pharmacological VTE Prophylaxis  Once        Question:  " Reasons:  Answer:  Already adequately anticoagulated on oral Anticoagulants    12/10/23 0102     IP VTE HIGH RISK PATIENT  Once         12/10/23 0102     Place sequential compression device  Until discontinued         12/10/23 0102                    Discharge Planning   RABIA:      Code Status: Full Code   Is the patient medically ready for discharge?:     Reason for patient still in hospital (select all that apply): Patient trending condition, Treatment, Consult recommendations, and Pending disposition  Discharge Plan A: Skilled Nursing Facility                  Verena Galarza MD  Department of Hospital Medicine   '[facility]

## 2023-12-18 NOTE — PLAN OF CARE
Discussed poc with pt, pt verbalized understanding  Purposeful rounding every 2hours  VS wnl  Cardiac monitoring in use, pt is NSR, tele monitor # [id number]  Blood glucose monitoring   Fall precautions in place, remains injury free  Pain and nausea under control with PRN meds  IVFs  Accurate I&Os  Abx given as prescribed  Bed locked at lowest position  Call light within reach  Chart check complete  Will cont with POC    Problem: Adult Inpatient Plan of Care  Goal: Plan of Care Review  12/18/2023 1439 by Anjel Tavarez RN  Outcome: Ongoing, Progressing  12/18/2023 1438 by Anjel Tavarez RN  Outcome: Ongoing, Progressing  Goal: Patient-Specific Goal (Individualized)  12/18/2023 1439 by Anjel Tavarez RN  Outcome: Ongoing, Progressing  12/18/2023 1438 by Anjel Tavarez RN  Outcome: Ongoing, Progressing  Goal: Absence of Hospital-Acquired Illness or Injury  12/18/2023 1439 by Anjel Tavarez RN  Outcome: Ongoing, Progressing  12/18/2023 1438 by Anjel Tavarez RN  Outcome: Ongoing, Progressing  Goal: Optimal Comfort and Wellbeing  12/18/2023 1439 by Anjel Tvaarez RN  Outcome: Ongoing, Progressing  12/18/2023 1438 by Anjel Tavarez RN  Outcome: Ongoing, Progressing  Goal: Readiness for Transition of Care  12/18/2023 1439 by Anjel Tavarez RN  Outcome: Ongoing, Progressing  12/18/2023 1438 by Anjel Tavarez RN  Outcome: Ongoing, Progressing     Problem: Bariatric Environmental Safety  Goal: Safety Maintained with Care  12/18/2023 1439 by Anjel Tavarez RN  Outcome: Ongoing, Progressing  12/18/2023 1438 by Anjel Tavarez RN  Outcome: Ongoing, Progressing     Problem: Diabetes Comorbidity  Goal: Blood Glucose Level Within Targeted Range  12/18/2023 1439 by Anjel Tavarez RN  Outcome: Ongoing, Progressing  12/18/2023 1438 by Anjel Tavarez RN  Outcome: Ongoing, Progressing     Problem: Skin Injury Risk Increased  Goal: Skin Health and Integrity  12/18/2023 1439 by Anjel Tavarez RN  Outcome: Ongoing,  Progressing  12/18/2023 1438 by Anjel Tavarez RN  Outcome: Ongoing, Progressing     Problem: Fall Injury Risk  Goal: Absence of Fall and Fall-Related Injury  12/18/2023 1439 by Anjel Tavarez RN  Outcome: Ongoing, Progressing  12/18/2023 1438 by Anjel Tavarez RN  Outcome: Ongoing, Progressing     Problem: Infection  Goal: Absence of Infection Signs and Symptoms  12/18/2023 1439 by Anjel Tavarez RN  Outcome: Ongoing, Progressing  12/18/2023 1438 by Anjel Tavarez RN  Outcome: Ongoing, Progressing     Problem: Fluid and Electrolyte Imbalance (Acute Kidney Injury/Impairment)  Goal: Fluid and Electrolyte Balance  12/18/2023 1439 by Anjel Tavarez RN  Outcome: Ongoing, Progressing  12/18/2023 1438 by Anjel Tavarez RN  Outcome: Ongoing, Progressing     Problem: Oral Intake Inadequate (Acute Kidney Injury/Impairment)  Goal: Optimal Nutrition Intake  12/18/2023 1439 by Anjel Tavarez RN  Outcome: Ongoing, Progressing  12/18/2023 1438 by Anjel Tavarez RN  Outcome: Ongoing, Progressing     Problem: Renal Function Impairment (Acute Kidney Injury/Impairment)  Goal: Effective Renal Function  12/18/2023 1439 by Anjel Tavarez RN  Outcome: Ongoing, Progressing  12/18/2023 1438 by Anjel Tavarez RN  Outcome: Ongoing, Progressing     Problem: Dysrhythmia  Goal: Normalized Cardiac Rhythm  12/18/2023 1439 by Anjel Tavarez RN  Outcome: Ongoing, Progressing  12/18/2023 1438 by Anjel Tavarez RN  Outcome: Ongoing, Progressing

## 2023-12-18 NOTE — ANESTHESIA PREPROCEDURE EVALUATION
12/18/2023  Roland Ng is a 68 y.o., male.  Patient Active Problem List   Diagnosis    Chronic allergic rhinitis    Hyperthyroidism    Acute exacerbation of CHF (congestive heart failure)    Diabetes mellitus with no complication    Hypertension    Morbid obesity with body mass index (BMI) of 40.0 to 44.9 in adult    Atherosclerosis of native coronary artery of native heart without angina pectoris    Paroxysmal A-fib    History of MI (myocardial infarction)    SOB (shortness of breath)    Lower extremity edema    Scrotal swelling    Atrial flutter    Systolic congestive heart failure    Lymphedema    GRAHAM (acute kidney injury)     Past Surgical History:   Procedure Laterality Date    APPENDECTOMY      Twice         Pre-op Assessment    I have reviewed the Patient Summary Reports.     I have reviewed the Nursing Notes. I have reviewed the NPO Status.   I have reviewed the Medications.     Review of Systems  Anesthesia Hx:             Denies Family Hx of Anesthesia complications.    Denies Personal Hx of Anesthesia complications.                    Cardiovascular:  Exercise tolerance: poor   Hypertension   CAD    Dysrhythmias atrial fibrillation  CHF        Ef 40%      Shortness of Breath    Coronary Artery Disease:               Congestive Heart Failure (CHF)                Hypertension     Atrial Fibrillation, Atrial Flutter     Pulmonary:      Shortness of breath                  Renal/:  Chronic Renal Disease        Kidney Function/Disease             Endocrine:  Diabetes  Hyperthyroidism  Diabetes                Hyperthyroidism                 Physical Exam  General: Well nourished, Cooperative, Alert and Oriented    Airway:  Mallampati: II   Mouth Opening: Normal  TM Distance: Normal  Tongue: Normal  Neck ROM: Normal ROM    Dental:  Intact        Anesthesia Plan  Type of Anesthesia, risks &  benefits discussed:    Anesthesia Type: MAC  Intra-op Monitoring Plan: Standard ASA Monitors  Post Op Pain Control Plan: multimodal analgesia  Induction:  IV  Informed Consent: Informed consent signed with the Patient and all parties understand the risks and agree with anesthesia plan.  All questions answered.   ASA Score: 3    Ready For Surgery From Anesthesia Perspective.     .

## 2023-12-19 VITALS
HEART RATE: 75 BPM | TEMPERATURE: 98 F | DIASTOLIC BLOOD PRESSURE: 63 MMHG | RESPIRATION RATE: 17 BRPM | OXYGEN SATURATION: 98 % | HEIGHT: 71 IN | SYSTOLIC BLOOD PRESSURE: 116 MMHG | WEIGHT: 297.81 LBS | BODY MASS INDEX: 41.69 KG/M2

## 2023-12-19 PROBLEM — R06.02 SOB (SHORTNESS OF BREATH): Status: RESOLVED | Noted: 2023-12-10 | Resolved: 2023-12-19

## 2023-12-19 PROBLEM — I50.9 ACUTE EXACERBATION OF CHF (CONGESTIVE HEART FAILURE): Status: RESOLVED | Noted: 2021-01-13 | Resolved: 2023-12-19

## 2023-12-19 LAB
ANION GAP SERPL CALC-SCNC: 13 MMOL/L (ref 8–16)
APTT PPP: 71.5 SEC (ref 21–32)
BASOPHILS # BLD AUTO: 0.03 K/UL (ref 0–0.2)
BASOPHILS NFR BLD: 0.4 % (ref 0–1.9)
BUN SERPL-MCNC: 32 MG/DL (ref 8–23)
CALCIUM SERPL-MCNC: 9.5 MG/DL (ref 8.7–10.5)
CHLORIDE SERPL-SCNC: 100 MMOL/L (ref 95–110)
CO2 SERPL-SCNC: 25 MMOL/L (ref 23–29)
CREAT SERPL-MCNC: 1.3 MG/DL (ref 0.5–1.4)
DIFFERENTIAL METHOD BLD: ABNORMAL
EOSINOPHIL # BLD AUTO: 0.1 K/UL (ref 0–0.5)
EOSINOPHIL NFR BLD: 0.9 % (ref 0–8)
ERYTHROCYTE [DISTWIDTH] IN BLOOD BY AUTOMATED COUNT: 15.7 % (ref 11.5–14.5)
EST. GFR  (NO RACE VARIABLE): >60 ML/MIN/1.73 M^2
GLUCOSE SERPL-MCNC: 111 MG/DL (ref 70–110)
HCT VFR BLD AUTO: 43.2 % (ref 40–54)
HGB BLD-MCNC: 14.3 G/DL (ref 14–18)
IMM GRANULOCYTES # BLD AUTO: 0.02 K/UL (ref 0–0.04)
IMM GRANULOCYTES NFR BLD AUTO: 0.3 % (ref 0–0.5)
LYMPHOCYTES # BLD AUTO: 2.1 K/UL (ref 1–4.8)
LYMPHOCYTES NFR BLD: 28.3 % (ref 18–48)
MAGNESIUM SERPL-MCNC: 2.2 MG/DL (ref 1.6–2.6)
MCH RBC QN AUTO: 25.6 PG (ref 27–31)
MCHC RBC AUTO-ENTMCNC: 33.1 G/DL (ref 32–36)
MCV RBC AUTO: 77 FL (ref 82–98)
MONOCYTES # BLD AUTO: 0.8 K/UL (ref 0.3–1)
MONOCYTES NFR BLD: 10.3 % (ref 4–15)
NEUTROPHILS # BLD AUTO: 4.5 K/UL (ref 1.8–7.7)
NEUTROPHILS NFR BLD: 59.8 % (ref 38–73)
NRBC BLD-RTO: 0 /100 WBC
PLATELET # BLD AUTO: 214 K/UL (ref 150–450)
PMV BLD AUTO: 11.9 FL (ref 9.2–12.9)
POTASSIUM SERPL-SCNC: 3.9 MMOL/L (ref 3.5–5.1)
RBC # BLD AUTO: 5.58 M/UL (ref 4.6–6.2)
SODIUM SERPL-SCNC: 138 MMOL/L (ref 136–145)
WBC # BLD AUTO: 7.57 K/UL (ref 3.9–12.7)

## 2023-12-19 PROCEDURE — 25000003 PHARM REV CODE 250: Performed by: STUDENT IN AN ORGANIZED HEALTH CARE EDUCATION/TRAINING PROGRAM

## 2023-12-19 PROCEDURE — 85025 COMPLETE CBC W/AUTO DIFF WBC: CPT | Performed by: INTERNAL MEDICINE

## 2023-12-19 PROCEDURE — 85730 THROMBOPLASTIN TIME PARTIAL: CPT | Performed by: INTERNAL MEDICINE

## 2023-12-19 PROCEDURE — 93010 ELECTROCARDIOGRAM REPORT: CPT | Mod: ,,, | Performed by: INTERNAL MEDICINE

## 2023-12-19 PROCEDURE — 36415 COLL VENOUS BLD VENIPUNCTURE: CPT | Performed by: INTERNAL MEDICINE

## 2023-12-19 PROCEDURE — 63600175 PHARM REV CODE 636 W HCPCS: Performed by: STUDENT IN AN ORGANIZED HEALTH CARE EDUCATION/TRAINING PROGRAM

## 2023-12-19 PROCEDURE — 99233 SBSQ HOSP IP/OBS HIGH 50: CPT | Mod: ,,, | Performed by: INTERNAL MEDICINE

## 2023-12-19 PROCEDURE — 25000003 PHARM REV CODE 250: Performed by: INTERNAL MEDICINE

## 2023-12-19 PROCEDURE — 93005 ELECTROCARDIOGRAM TRACING: CPT

## 2023-12-19 PROCEDURE — 25000003 PHARM REV CODE 250: Performed by: HOSPITALIST

## 2023-12-19 PROCEDURE — 83735 ASSAY OF MAGNESIUM: CPT | Performed by: INTERNAL MEDICINE

## 2023-12-19 PROCEDURE — 80048 BASIC METABOLIC PNL TOTAL CA: CPT | Performed by: HOSPITALIST

## 2023-12-19 RX ORDER — METOPROLOL SUCCINATE 50 MG/1
50 TABLET, EXTENDED RELEASE ORAL 2 TIMES DAILY
Qty: 60 TABLET | Refills: 0
Start: 2023-12-19 | End: 2023-12-19

## 2023-12-19 RX ORDER — GUAIFENESIN 100 MG/5ML
81 LIQUID (ML) ORAL DAILY
Refills: 0
Start: 2023-12-19 | End: 2024-02-22

## 2023-12-19 RX ORDER — METOPROLOL SUCCINATE 50 MG/1
50 TABLET, EXTENDED RELEASE ORAL 2 TIMES DAILY
Qty: 60 TABLET | Refills: 0 | Status: SHIPPED | OUTPATIENT
Start: 2023-12-19 | End: 2024-02-22

## 2023-12-19 RX ADMIN — CETIRIZINE HYDROCHLORIDE 10 MG: 10 TABLET, FILM COATED ORAL at 09:12

## 2023-12-19 RX ADMIN — APIXABAN 5 MG: 2.5 TABLET, FILM COATED ORAL at 09:12

## 2023-12-19 RX ADMIN — METHIMAZOLE 5 MG: 5 TABLET ORAL at 09:12

## 2023-12-19 RX ADMIN — ASPIRIN 81 MG CHEWABLE TABLET 81 MG: 81 TABLET CHEWABLE at 09:12

## 2023-12-19 RX ADMIN — HEPARIN SODIUM 19 UNITS/KG/HR: 10000 INJECTION, SOLUTION INTRAVENOUS at 07:12

## 2023-12-19 RX ADMIN — METOPROLOL SUCCINATE 50 MG: 50 TABLET, EXTENDED RELEASE ORAL at 09:12

## 2023-12-19 NOTE — PLAN OF CARE
12/19/23 1307   Post-Acute Status   Post-Acute Authorization Home Health   Home Health Status Referrals Sent   Discharge Plan   Discharge Plan A Home Health     Hh referral sent to [facility]

## 2023-12-19 NOTE — SUBJECTIVE & OBJECTIVE
Interval History: improved    Review of Systems   Constitutional: Negative for chills, diaphoresis, night sweats, weight gain and weight loss.   HENT:  Negative for congestion, hoarse voice, sore throat and stridor.    Eyes:  Negative for double vision and pain.   Cardiovascular:  Negative for chest pain, claudication, cyanosis, dyspnea on exertion, irregular heartbeat, leg swelling, near-syncope, orthopnea, palpitations, paroxysmal nocturnal dyspnea and syncope.   Respiratory:  Negative for cough, hemoptysis, shortness of breath, sleep disturbances due to breathing, snoring, sputum production and wheezing.    Endocrine: Negative for cold intolerance, heat intolerance and polydipsia.   Hematologic/Lymphatic: Negative for bleeding problem. Does not bruise/bleed easily.   Skin:  Negative for color change, dry skin and rash.   Musculoskeletal:  Negative for joint swelling and muscle cramps.   Gastrointestinal:  Negative for bloating, abdominal pain, constipation, diarrhea, dysphagia, melena, nausea and vomiting.   Genitourinary:  Negative for flank pain and urgency.   Neurological:  Negative for dizziness, focal weakness, headaches, light-headedness, loss of balance, seizures and weakness.   Psychiatric/Behavioral:  Negative for altered mental status and memory loss. The patient is not nervous/anxious.      Objective:     Vital Signs (Most Recent):  Temp: 97.8 °F (36.6 °C) (12/19/23 0721)  Pulse: 73 (12/19/23 0721)  Resp: 18 (12/19/23 0721)  BP: 116/75 (12/19/23 0721)  SpO2: 97 % (12/19/23 0721) Vital Signs (24h Range):  Temp:  [97.4 °F (36.3 °C)-98.6 °F (37 °C)] 97.8 °F (36.6 °C)  Pulse:  [73-88] 73  Resp:  [15-20] 18  SpO2:  [95 %-99 %] 97 %  BP: (112-131)/(58-75) 116/75     Weight: 135.1 kg (297 lb 13.5 oz)  Body mass index is 41.54 kg/m².     SpO2: 97 %         Intake/Output Summary (Last 24 hours) at 12/19/2023 1145  Last data filed at 12/19/2023 1009  Gross per 24 hour   Intake 1017.12 ml   Output 1450 ml   Net  -432.88 ml       Lines/Drains/Airways       Peripheral Intravenous Line  Duration                  Midline Catheter Insertion/Assessment  - Single Lumen 12/15/23 2231 Left basilic vein (medial side of arm) other (see comments) 3 days                       Physical Exam  Eyes:      Pupils: Pupils are equal, round, and reactive to light.   Neck:      Trachea: No tracheal deviation.   Cardiovascular:      Rate and Rhythm: Normal rate and regular rhythm.      Pulses: Intact distal pulses.           Carotid pulses are 2+ on the right side and 2+ on the left side.       Radial pulses are 2+ on the right side and 2+ on the left side.        Femoral pulses are 2+ on the right side and 2+ on the left side.       Popliteal pulses are 2+ on the right side and 2+ on the left side.        Dorsalis pedis pulses are 2+ on the right side and 2+ on the left side.        Posterior tibial pulses are 2+ on the right side and 2+ on the left side.      Heart sounds: Normal heart sounds. No murmur heard.     No friction rub. No gallop.   Pulmonary:      Effort: Pulmonary effort is normal. No respiratory distress.      Breath sounds: Normal breath sounds. No stridor. No wheezing or rales.   Chest:      Chest wall: No tenderness.   Abdominal:      General: There is no distension.      Tenderness: There is no abdominal tenderness. There is no rebound.   Musculoskeletal:         General: No tenderness.      Cervical back: Normal range of motion.   Skin:     General: Skin is warm and dry.   Neurological:      Mental Status: He is alert and oriented to person, place, and time.            Significant Labs: CMP   Recent Labs   Lab 12/18/23  0535 12/19/23  0627    138   K 3.8 3.9   CL 98 100   CO2 27 25    111*   BUN 53* 32*   CREATININE 1.8* 1.3   CALCIUM 9.7 9.5   ANIONGAP 14 13       Significant Imaging: Echocardiogram: Transthoracic echo (TTE) complete (Cupid Only):   Results for orders placed or performed during the hospital  encounter of 12/09/23   Echo   Result Value Ref Range    BSA 2.62 m2    LVOT stroke volume 44.74 cm3    LVIDd 5.31 3.5 - 6.0 cm    LV Systolic Volume 81.84 mL    LV Systolic Volume Index 32.5 mL/m2    LVIDs 4.27 (A) 2.1 - 4.0 cm    LV Diastolic Volume 135.86 mL    LV Diastolic Volume Index 53.91 mL/m2    IVS 1.28 (A) 0.6 - 1.1 cm    LVOT diameter 2.07 cm    LVOT area 3.4 cm2    FS 20 (A) 28 - 44 %    Left Ventricle Relative Wall Thickness 0.49 cm    Posterior Wall 1.29 (A) 0.6 - 1.1 cm    LV mass 283.13 g    LV Mass Index 112 g/m2    MV Peak E Ishmael 1.08 m/s    TDI LATERAL 0.11 m/s    TDI SEPTAL 0.08 m/s    E/E' ratio 11.37 m/s    MV Peak A Ishmael 0.23 m/s    TR Max Ishmael 2.78 m/s    E/A ratio 4.70     IVRT 31.40 msec    E wave deceleration time 167.06 msec    LV SEPTAL E/E' RATIO 13.50 m/s    LV LATERAL E/E' RATIO 9.82 m/s    LVOT peak ishmael 0.68 m/s    Left Ventricular Outflow Tract Mean Velocity 0.44 cm/s    Left Ventricular Outflow Tract Mean Gradient 0.91 mmHg    RV mid diameter 4.02 cm    RVOT peak VTI 9.8 cm    TAPSE 1.70 cm    LA size 5.35 cm    Left Atrium Major Axis 6.56 cm    RA Major Axis 5.76 cm    AV mean gradient 3 mmHg    AV peak gradient 4 mmHg    Ao peak ishmael 1.04 m/s    Ao VTI 17.90 cm    LVOT peak VTI 13.30 cm    AV valve area 2.50 cm²    AV Velocity Ratio 0.65     AV index (prosthetic) 0.74     MARILYN by Velocity Ratio 2.20 cm²    Mr max ishmael 4.52 m/s    MV stenosis pressure 1/2 time 48.45 ms    MV valve area p 1/2 method 4.54 cm2    Triscuspid Valve Regurgitation Peak Gradient 31 mmHg    PV mean gradient 1 mmHg    RVOT peak ishmael 0.56 m/s    Ao root annulus 3.22 cm    STJ 3.20 cm    Ascending aorta 3.27 cm    IVC diameter 1.93 cm    Mean e' 0.10 m/s    ZLVIDS -5.62     ZLVIDD -10.17     LA WIDTH 4.7 cm    RA Width 4.4 cm    EF 40 %    TV resting pulmonary artery pressure 39 mmHg    RV TB RVSP 11 mmHg    Est. RA pres 8 mmHg    Narrative      Left Ventricle: The left ventricle is normal in size. Normal wall    thickness. There is concentric remodeling. Mild global hyperkinesis   present. There is reduced systolic function. Ejection fraction by visual   approximation is 40%. There is normal diastolic function.    Right Ventricle: Moderate right ventricular enlargement. Wall thickness   is normal. Systolic function is moderately reduced.    Mitral Valve: There is mild regurgitation.    Tricuspid Valve: There is moderate regurgitation.    Pulmonary Artery: The estimated pulmonary artery systolic pressure is   39 mmHg.    IVC/SVC: Intermediate venous pressure at 8 mmHg.

## 2023-12-19 NOTE — PLAN OF CARE
[facility]  Discharge Final Note    Primary Care Provider: Janis Green NP    Expected Discharge Date: 12/19/2023    Final Discharge Note (most recent)       Final Note - 12/19/23 0944          Final Note    Assessment Type Final Discharge Note     Anticipated Discharge Disposition Skilled Nursing Facility        Post-Acute Status    Post-Acute Authorization Placement     Post-Acute Placement Status Set-up Complete/Auth obtained     Coverage healthy blue

## 2023-12-19 NOTE — PLAN OF CARE
[facility]  Discharge Final Note    Primary Care Provider: Janis Green NP    Expected Discharge Date: 12/19/2023    Final Discharge Note (most recent)       Final Note - 12/19/23 1301          Final Note    Assessment Type Final Discharge Note     Anticipated Discharge Disposition Home or Self Care                     Important Message from Medicare             Contact Info       Janis Green NP   Specialty: Internal Medicine   Relationship: PCP - General    [id number] [place] [address]  [place] [address]   Phone: [phone]       Next Steps: Follow up in 3 day(s)    Vazquez Decker MD   Specialty: Interventional Cardiology, Cardiology    [address] [place] [address] [address] [place] [address]   Phone: [phone]       Next Steps: Follow up in 1 week(s)

## 2023-12-19 NOTE — PROGRESS NOTES
[facility]  Cardiology  Progress Note    Patient Name: Roland Ng  MRN: [id number]  Admission Date: 12/9/2023  Hospital Length of Stay: 9 days  Code Status: Full Code   Attending Physician: Verena Kothari MD   Primary Care Physician: Janis Green NP  Expected Discharge Date: 12/19/2023  Principal Problem:Atrial flutter    Subjective:     Hospital Course:   12/11/23-Patient seen and examined today, sitting up in bed. Feels ok. Remains SOB, needs continued IV diuresis. HR controlled. Labs reviewed.     12/12/23-Patient seen and examined today, sitting up in bed. Diuresing well. SOB improving but still significantly overloaded. HR variable. Labs reviewed. Creatinine 1.0, K 3.4. Needs ZOE/DCCV once compensated.    12/13/23-Patient seen and examined today, sitting up in bedside chair. Diuresing well. States he was able to ambulate down the hallway today. Still overloaded, continue diuretics. Labs reviewed/stable.    12/14/23-Patient seen and examined today, resting in bed. Feels ok. Diuresing well. SOB/edema improving. Creatinine stable. Plans for ZOE/DCCV once compensated.     12/15/23-Patient seen and examined today, sitting up in bedside chair. Continues to improve. Starting to get wrinkles in BLE. SOB better. Creatinine 1.3, BUN up-trending.    12/18/2023 patient is stable plan for ZOE cardioversion    12/19/23-Patient seen and examined. Stable and discharge today    Interval History: improved    Review of Systems   Constitutional: Negative for chills, diaphoresis, night sweats, weight gain and weight loss.   HENT:  Negative for congestion, hoarse voice, sore throat and stridor.    Eyes:  Negative for double vision and pain.   Cardiovascular:  Negative for chest pain, claudication, cyanosis, dyspnea on exertion, irregular heartbeat, leg swelling, near-syncope, orthopnea, palpitations, paroxysmal nocturnal dyspnea and syncope.   Respiratory:  Negative for cough, hemoptysis, shortness of  breath, sleep disturbances due to breathing, snoring, sputum production and wheezing.    Endocrine: Negative for cold intolerance, heat intolerance and polydipsia.   Hematologic/Lymphatic: Negative for bleeding problem. Does not bruise/bleed easily.   Skin:  Negative for color change, dry skin and rash.   Musculoskeletal:  Negative for joint swelling and muscle cramps.   Gastrointestinal:  Negative for bloating, abdominal pain, constipation, diarrhea, dysphagia, melena, nausea and vomiting.   Genitourinary:  Negative for flank pain and urgency.   Neurological:  Negative for dizziness, focal weakness, headaches, light-headedness, loss of balance, seizures and weakness.   Psychiatric/Behavioral:  Negative for altered mental status and memory loss. The patient is not nervous/anxious.      Objective:     Vital Signs (Most Recent):  Temp: 97.8 °F (36.6 °C) (12/19/23 0721)  Pulse: 73 (12/19/23 0721)  Resp: 18 (12/19/23 0721)  BP: 116/75 (12/19/23 0721)  SpO2: 97 % (12/19/23 0721) Vital Signs (24h Range):  Temp:  [97.4 °F (36.3 °C)-98.6 °F (37 °C)] 97.8 °F (36.6 °C)  Pulse:  [73-88] 73  Resp:  [15-20] 18  SpO2:  [95 %-99 %] 97 %  BP: (112-131)/(58-75) 116/75     Weight: 135.1 kg (297 lb 13.5 oz)  Body mass index is 41.54 kg/m².     SpO2: 97 %         Intake/Output Summary (Last 24 hours) at 12/19/2023 1145  Last data filed at 12/19/2023 1009  Gross per 24 hour   Intake 1017.12 ml   Output 1450 ml   Net -432.88 ml       Lines/Drains/Airways       Peripheral Intravenous Line  Duration                  Midline Catheter Insertion/Assessment  - Single Lumen 12/15/23 2231 Left basilic vein (medial side of arm) other (see comments) 3 days                       Physical Exam  Eyes:      Pupils: Pupils are equal, round, and reactive to light.   Neck:      Trachea: No tracheal deviation.   Cardiovascular:      Rate and Rhythm: Normal rate and regular rhythm.      Pulses: Intact distal pulses.           Carotid pulses are 2+ on the  right side and 2+ on the left side.       Radial pulses are 2+ on the right side and 2+ on the left side.        Femoral pulses are 2+ on the right side and 2+ on the left side.       Popliteal pulses are 2+ on the right side and 2+ on the left side.        Dorsalis pedis pulses are 2+ on the right side and 2+ on the left side.        Posterior tibial pulses are 2+ on the right side and 2+ on the left side.      Heart sounds: Normal heart sounds. No murmur heard.     No friction rub. No gallop.   Pulmonary:      Effort: Pulmonary effort is normal. No respiratory distress.      Breath sounds: Normal breath sounds. No stridor. No wheezing or rales.   Chest:      Chest wall: No tenderness.   Abdominal:      General: There is no distension.      Tenderness: There is no abdominal tenderness. There is no rebound.   Musculoskeletal:         General: No tenderness.      Cervical back: Normal range of motion.   Skin:     General: Skin is warm and dry.   Neurological:      Mental Status: He is alert and oriented to person, place, and time.            Significant Labs: CMP   Recent Labs   Lab 12/18/23  0535 12/19/23  0627    138   K 3.8 3.9   CL 98 100   CO2 27 25    111*   BUN 53* 32*   CREATININE 1.8* 1.3   CALCIUM 9.7 9.5   ANIONGAP 14 13       Significant Imaging: Echocardiogram: Transthoracic echo (TTE) complete (Cupid Only):   Results for orders placed or performed during the hospital encounter of 12/09/23   Echo   Result Value Ref Range    BSA 2.62 m2    LVOT stroke volume 44.74 cm3    LVIDd 5.31 3.5 - 6.0 cm    LV Systolic Volume 81.84 mL    LV Systolic Volume Index 32.5 mL/m2    LVIDs 4.27 (A) 2.1 - 4.0 cm    LV Diastolic Volume 135.86 mL    LV Diastolic Volume Index 53.91 mL/m2    IVS 1.28 (A) 0.6 - 1.1 cm    LVOT diameter 2.07 cm    LVOT area 3.4 cm2    FS 20 (A) 28 - 44 %    Left Ventricle Relative Wall Thickness 0.49 cm    Posterior Wall 1.29 (A) 0.6 - 1.1 cm    LV mass 283.13 g    LV Mass Index 112  g/m2    MV Peak E Ishmael 1.08 m/s    TDI LATERAL 0.11 m/s    TDI SEPTAL 0.08 m/s    E/E' ratio 11.37 m/s    MV Peak A Ishmael 0.23 m/s    TR Max Ishmael 2.78 m/s    E/A ratio 4.70     IVRT 31.40 msec    E wave deceleration time 167.06 msec    LV SEPTAL E/E' RATIO 13.50 m/s    LV LATERAL E/E' RATIO 9.82 m/s    LVOT peak ishmael 0.68 m/s    Left Ventricular Outflow Tract Mean Velocity 0.44 cm/s    Left Ventricular Outflow Tract Mean Gradient 0.91 mmHg    RV mid diameter 4.02 cm    RVOT peak VTI 9.8 cm    TAPSE 1.70 cm    LA size 5.35 cm    Left Atrium Major Axis 6.56 cm    RA Major Axis 5.76 cm    AV mean gradient 3 mmHg    AV peak gradient 4 mmHg    Ao peak ishmael 1.04 m/s    Ao VTI 17.90 cm    LVOT peak VTI 13.30 cm    AV valve area 2.50 cm²    AV Velocity Ratio 0.65     AV index (prosthetic) 0.74     MARILYN by Velocity Ratio 2.20 cm²    Mr max ishmael 4.52 m/s    MV stenosis pressure 1/2 time 48.45 ms    MV valve area p 1/2 method 4.54 cm2    Triscuspid Valve Regurgitation Peak Gradient 31 mmHg    PV mean gradient 1 mmHg    RVOT peak ishmael 0.56 m/s    Ao root annulus 3.22 cm    STJ 3.20 cm    Ascending aorta 3.27 cm    IVC diameter 1.93 cm    Mean e' 0.10 m/s    ZLVIDS -5.62     ZLVIDD -10.17     LA WIDTH 4.7 cm    RA Width 4.4 cm    EF 40 %    TV resting pulmonary artery pressure 39 mmHg    RV TB RVSP 11 mmHg    Est. RA pres 8 mmHg    Narrative      Left Ventricle: The left ventricle is normal in size. Normal wall   thickness. There is concentric remodeling. Mild global hyperkinesis   present. There is reduced systolic function. Ejection fraction by visual   approximation is 40%. There is normal diastolic function.    Right Ventricle: Moderate right ventricular enlargement. Wall thickness   is normal. Systolic function is moderately reduced.    Mitral Valve: There is mild regurgitation.    Tricuspid Valve: There is moderate regurgitation.    Pulmonary Artery: The estimated pulmonary artery systolic pressure is   39 mmHg.    IVC/SVC:  Intermediate venous pressure at 8 mmHg.       Assessment and Plan:     Brief HPI: stable and discharge    * Atrial flutter  Rate controlled with toprol xl 50 bid  Once euvolemic, if still symptomatic with SOB, will consider ZOE/Cardioversion vs EP evaluation for flutter/fib ablation    12/11/23  -Continue Toprol XL  -Continue heparin gtt  -Keep NPO after MN for possible ZOE/DCCV tmw    12/12/23  -Still volume overloaded, continue IV diuresis  -Continue BB, heparin gtt  -ZOE/DCCV once compensated    12/13/23  -See above    Lower extremity edema  -Assess response to IV diuresis  -CVI also contributing    Paroxysmal A-fib    -Continue anticoagulation and toprol xl    12/18/2023 and ZOE cardioversion    12/19/23-post cardioversion  Continue eliquis    Morbid obesity with body mass index (BMI) of 40.0 to 44.9 in adult  -weight loss    Hypertension  Titrate medications         VTE Risk Mitigation (From admission, onward)           Ordered     apixaban tablet 5 mg  2 times daily         12/19/23 0753     Reason for No Pharmacological VTE Prophylaxis  Once        Question:  Reasons:  Answer:  Already adequately anticoagulated on oral Anticoagulants    12/10/23 0102     IP VTE HIGH RISK PATIENT  Once         12/10/23 0102     Place sequential compression device  Until discontinued         12/10/23 0102                    Juancarlos Edwards MD  Cardiology  [facility]

## 2023-12-19 NOTE — PLAN OF CARE
Problem: Adult Inpatient Plan of Care  Goal: Plan of Care Review  Outcome: Ongoing, Progressing  Flowsheets (Taken 12/19/2023 7696)  Plan of Care Reviewed With: patient     Problem: Bariatric Environmental Safety  Goal: Safety Maintained with Care  Outcome: Ongoing, Progressing     Problem: Diabetes Comorbidity  Goal: Blood Glucose Level Within Targeted Range  Outcome: Ongoing, Progressing     Problem: Skin Injury Risk Increased  Goal: Skin Health and Integrity  Outcome: Ongoing, Progressing     Problem: Fall Injury Risk  Goal: Absence of Fall and Fall-Related Injury  Outcome: Ongoing, Progressing     Problem: Infection  Goal: Absence of Infection Signs and Symptoms  Outcome: Ongoing, Progressing     Problem: Renal Function Impairment (Acute Kidney Injury/Impairment)  Goal: Effective Renal Function  Outcome: Ongoing, Progressing     Problem: Dysrhythmia  Goal: Normalized Cardiac Rhythm  Outcome: Ongoing, Progressing       POC reviewed with pt. verbalized understanding. Hep gtt infusing. VS WNL. Purposeful rounding. all safety measures maintained. Will continue to monitor.

## 2023-12-19 NOTE — PLAN OF CARE
Pt stated he doesn't want to go to snf. Pt and pt's son wants pt to go home. Pt's son stated he will have help at home.

## 2023-12-19 NOTE — ASSESSMENT & PLAN NOTE
-Continue anticoagulation and toprol xl    12/18/2023 and ZOE cardioversion    12/19/23-post cardioversion  Continue eliquis

## 2023-12-20 ENCOUNTER — PES CALL (OUTPATIENT)
Dept: HOME HEALTH SERVICES | Facility: CLINIC | Age: 68
End: 2023-12-20
Payer: COMMERCIAL

## 2023-12-21 ENCOUNTER — TELEPHONE (OUTPATIENT)
Dept: CARDIOLOGY | Facility: CLINIC | Age: 68
End: 2023-12-21
Payer: COMMERCIAL

## 2023-12-21 NOTE — TELEPHONE ENCOUNTER
I called pt. No answer. Phone just rang. No VM for me to leave a message,         ----- Message from Debo Nichole LPN sent at 2023  9:27 AM CST -----  Regardin hr

## 2023-12-28 NOTE — PHYSICIAN QUERY
PT Name: Roland Ng  MR #: [id number]    DOCUMENTATION CLARIFICATION      CDS/: Hyacinth Michelle RN               Contact information: [email or web address]    This form is a permanent document in the medical record.     Query Date: December 28, 2023    By submitting this query, we are merely seeking further clarification of documentation. Please utilize your independent clinical judgment when addressing the question(s) below.    The Medical Record contains the following:   Indicators   Supporting Clinical Findings Location in Medical Record   x Hypertension associated with diabetes documented PMH:   Hypertension associated with diabetes   H&P 12/10       x Chronic condition(s) Roland Ng is a 68 y.o. male with a PMH  has a past medical history of CAD (coronary artery disease), Chronic congestive heart failure (01/13/2021), Diabetes mellitus  with no complication, Hypertension associated with diabetes, Hyperthyroidism (01/13/2021), Morbid obesity with body mass index (BMI) of 40.0 to 44.9 in adult, and Paroxysmal A-fib. who presented to the ED for further evaluation of progressively worsening  dyspnea/shortness a breath was well as bilateral lower extremity, scrotal, and penile swelling/edema   H&P 12/10   x Vital signs Vital Signs (24h Range):  Temp:  [97.9 °F (36.6 °C)] 97.9 °F (36.6 °C)  Pulse:  [] 153  Resp:  [22-24] 22  SpO2:  [95 %-99 %] 99 %  BP: (121-152)/() 124/71    Temp:  [97.7 °F (36.5 °C)-98.3 °F (36.8 °C)] 98.1 °F (36.7 °C)  Pulse:  [50-90] 50  Resp:  [13-20] 15  SpO2:  [94 %-100 %] 100 %  BP: ()/(62-77) 146/65    Temp:  [98 °F (36.7 °C)-98.4 °F (36.9 °C)] 98.4 °F (36.9 °C)  Pulse:  [] 85  Resp:  [15-20] 15  SpO2:  [95 %-100 %] 97 %  BP: (119-146)/(62-74) 131/73     Temp:  [97.7 °F (36.5 °C)-98.9 °F (37.2 °C)] 98.9 °F (37.2 °C)  Pulse:  [62-98] 85  Resp:  [18-24] 18  SpO2:  [92 %-100 %] 100 %  BP: ()/(57-67) 116/63    Temp:  [97.7 °F (36.5  °C)-98.9 °F (37.2 °C)] 98.2 °F (36.8 °C)  Pulse:  [42-99] 66  Resp:  [18] 18  SpO2:  [95 %-99 %] 96 %  BP: ()/(55-63) 124/56    Temp:  [97.8 °F (36.6 °C)-98.2 °F (36.8 °C)] 97.8 °F (36.6 °C)  Pulse:  [42-85] 43  Resp:  [18-19] 18  SpO2:  [93 %-97 %] 96 %  BP: ()/(52-87) 99/54    Temp:  [97.5 °F (36.4 °C)-98.4 °F (36.9 °C)] 97.5 °F (36.4 °C)  Pulse:  [40-92] 41  Resp:  [16-20] 16  SpO2:  [93 %-98 %] 96 %  BP: (102-137)/(53-71) 116/54    Temp:  [97.8 °F (36.6 °C)-99.1 °F (37.3 °C)] 98 °F (36.7 °C)  Pulse:  [57-91] 81  Resp:  [16-18] 16  SpO2:  [93 %-98 %] 98 %  BP: (101-124)/(52-75) 108/55    Temp:  [97.4 °F (36.3 °C)-99.3 °F (37.4 °C)] 98 °F (36.7 °C)  Pulse:  [69-97] 83  Resp:  [15-20] 15  SpO2:  [94 %-99 %] 99 %  BP: ()/(53-78) 131/59 H&P 12/10              HM PN 12/11            HM PN 12/12            HM PN 12/13            HM PN 12/14            HM PN 12/15            HM PN 12/16            HM PN 12/17             PN 12/18             x Treatment/Medication Lisinopril 20 mg PO QD  Metoprolol 50 mg PO QD  Metoprolol 50 mg PO BID   MAR 12/10-12/17  MAR 12/10  MAR 12/10-12/19    Other         Provider, please clarify the relationship between the Hypertension and Diabetes Mellitus    [   ] Hypertension is a manifestation of Diabetes Mellitus (Secondary Hypertension)     [  x ]  Hypertension is not a manifestation of Diabetes Mellitus (Essential Hypertension)     [   ] Other Clarification (please specify): ____________         Please document in your progress notes daily for the duration of treatment, until resolved, and include in your discharge summary.

## 2024-01-05 NOTE — ASSESSMENT & PLAN NOTE
Patient with acute kidney injury/acute renal failure likely due to pre-renal azotemia due to IVVD GRAHAM is currently worsening. Baseline creatinine  1  - Labs reviewed- Renal function/electrolytes with CrCl cannot be calculated (Patient's most recent lab result is older than the maximum 7 days allowed.). according to latest data. Monitor urine output and serial BMP and adjust therapy as needed. Avoid nephrotoxins and renally dose meds for GFR listed above.    Continue to hold diuretics   Monitor renal function  Monitor Is&Os

## 2024-01-05 NOTE — ASSESSMENT & PLAN NOTE
Chronic, controlled. Latest blood pressure and vitals reviewed-      .   Home meds for hypertension were reviewed and noted below.   Hypertension Medications               amLODIPine (NORVASC) 10 MG tablet Take 1 tablet (10 mg total) by mouth once daily.    furosemide (LASIX) 40 MG tablet Take 1 tablet (40 mg total) by mouth once daily.    lisinopriL (PRINIVIL,ZESTRIL) 20 MG tablet Take 1 tablet (20 mg total) by mouth once daily.    metoprolol succinate (TOPROL-XL) 50 MG 24 hr tablet Take 1 tablet (50 mg total) by mouth once daily.          While in the hospital, will manage blood pressure as follows;BP marginal will hold    Will utilize p.r.n. blood pressure medication only if patient's blood pressure greater than 160/100 and he develops symptoms such as worsening chest pain or shortness of breath.

## 2024-01-05 NOTE — DISCHARGE SUMMARY
[facility] Medicine  Discharge Summary      Patient Name: Roland Ng  MRN: [id number]  [facility]: [id number]  Patient Class: IP- Inpatient  Admission Date: 12/9/2023  Hospital Length of Stay: 9 days  Discharge Date and Time: 12/19/2023  1:44 PM  Attending Physician: No att. providers found   Discharging Provider: Verena Galarza MD  Primary Care Provider: Janis Green NP    Primary Care Team: Networked reference to record PCT     HPI:   Roland Ng is a 68 y.o. male with a PMH  has a past medical history of CAD (coronary artery disease), Chronic congestive heart failure (01/13/2021), Diabetes mellitus with no complication, Hypertension associated with diabetes, Hyperthyroidism (01/13/2021), Morbid obesity with body mass index (BMI) of 40.0 to 44.9 in adult, and Paroxysmal A-fib. who presented to the ED for further evaluation of progressively worsening dyspnea/shortness a breath was well as bilateral lower extremity, scrotal, and penile swelling/edema.  Patient reports significant history of heart failure and was previously followed by Dr. Decker but stated he is not seen him in quite some time.  Patient also reported being off his home medications for approximately 3-4 months prior to getting back on them due to insurance related issues and has been compliant over the past 3 months.  Patient also reports non adherence to fluid/dietary restrictions and states he drinks approximately 20 bottles of water daily.  He reported no known alleviating or aggravating factors noted and reported associated symptoms included dyspnea both at rest and on exertion, orthopnea, PND, and wheezing in addition to reported swelling as noted above.  Patient reports negative history of asthma/COPD, does not use home oxygen, and does not use home CPAP.  All other review of systems negative except as noted above.  Initial workup in the ED concerning for signs/symptoms of acute CHF exacerbation with  patient being admitted to Hospital Medicine inpatient for continued medical management.      PCP: Janis Green      Procedure(s) (LRB):  Transesophageal echo (DANIELLE) intra-procedure log documentation (N/A)      Hospital Course:   Cardiology consulted.  Patient currently being treated for CHF exacerbation with IV diuretics.  Given persistent atrial flutter, planned for DANIELLE/cardioversion once euvolemic.    PT/OT recommends moderate intensity therapy on discharge. SW consulted for SNF placement.  Has been accepted at [facility], insurance authorization obtained.    Tentatively planned for danielle/cardioversion on Monday 12/18/2023.    Of note, patient recorded to have multiple instances of heart rate in the 40s on EMR.  However telemetry review did not show any bradycardia, patient's heart rate has been staying mostly in the 80s and occasionally goes up over 100s for short period of time.  S/p cardioversion. And will dc on eliquis to follow up with cards.    Hospital course complicated by GRAHAM likely iatrogenic from diuresis.  Diuretics held.    Pt seen and examined on day of dc and stable for dc.      Goals of Care Treatment Preferences:  Code Status: Full Code      Consults:   Consults (From admission, onward)          Status Ordering Provider     Inpatient consult to Social Work  Once        Provider:  (Not yet assigned)    Completed GERI MAX     IP consult to case management  Once        Provider:  (Not yet assigned)    Completed GERI MAX     Inpatient consult to Registered Dietitian/Nutritionist  Once        Provider:  (Not yet assigned)    Completed ANTONI KINGSTON     Inpatient consult to Cardiology  Once        Provider:  Phuc Marshall MD    Completed LILIAN AGRAWAL     Inpatient consult to Social Work/Case Management  Once        Provider:  (Not yet assigned)    Completed LILIAN AGRAWAL     Inpatient consult to Registered Dietitian/Nutritionist  Once        Provider:  (Not yet  assigned)    Completed LILIAN AGRAWAL            Cardiac/Vascular  * Atrial flutter  Remains in atrial flutter, rate controlled with Toprol  Continue heparin infusion  Planned for ZOE/cardioversion 12/18      Paroxysmal A-fib  Patient with Paroxysmal (<7 days) atrial fibrillation which is uncontrolled currently with Beta Blocker and Calcium Channel Blocker. Patient is currently in atrial fibrillation.[id number] Score: 3. Anticoagulation indicated. Anticoagulation done with Eliquis .      Atherosclerosis of native coronary artery of native heart without angina pectoris  Patient with known CAD s/p  LHC without stent placement , which is controlled Will continue  Eliquis, ASA, and Statin and monitor for S/Sx of angina/ACS. Continue to monitor on telemetry.       Hypertension  Chronic, controlled. Latest blood pressure and vitals reviewed-      .   Home meds for hypertension were reviewed and noted below.   Hypertension Medications               amLODIPine (NORVASC) 10 MG tablet Take 1 tablet (10 mg total) by mouth once daily.    furosemide (LASIX) 40 MG tablet Take 1 tablet (40 mg total) by mouth once daily.    lisinopriL (PRINIVIL,ZESTRIL) 20 MG tablet Take 1 tablet (20 mg total) by mouth once daily.    metoprolol succinate (TOPROL-XL) 50 MG 24 hr tablet Take 1 tablet (50 mg total) by mouth once daily.          While in the hospital, will manage blood pressure as follows;BP marginal will hold    Will utilize p.r.n. blood pressure medication only if patient's blood pressure greater than 160/100 and he develops symptoms such as worsening chest pain or shortness of breath.      Renal/  GRAHAM (acute kidney injury)  Patient with acute kidney injury/acute renal failure likely due to pre-renal azotemia due to IVVD GRAHAM is currently worsening. Baseline creatinine  1  - Labs reviewed- Renal function/electrolytes with CrCl cannot be calculated (Patient's most recent lab result is older than the maximum 7 days allowed.).  according to latest data. Monitor urine output and serial BMP and adjust therapy as needed. Avoid nephrotoxins and renally dose meds for GFR listed above.    Continue to hold diuretics   Monitor renal function  Monitor Is&Os    Scrotal swelling  Ultrasound showed large bilateral hydrocele with extensive scrotal wall thickening or edema, no abscess   Likely secondary to excessive volume in setting of CHF exacerbation      Endocrine  Morbid obesity with body mass index (BMI) of 40.0 to 44.9 in adult  Body mass index is 41.54 kg/m². Morbid obesity complicates all aspects of disease management from diagnostic modalities to treatment. Weight loss encouraged and health benefits explained to patient.       Hyperthyroidism  Patient has chronic hyperthyroidism. TFTs reviewed-   Lab Results   Component Value Date    TSH 0.083 (L) 12/10/2023     Although TSH is slightly low, free T4 is within normal limits  Will continue home methimazole  Outpatient follow-up for further monitoring and medication titration as needed    Other  Lymphedema  Referral to Lymphedema clinic outpatient      Lower extremity edema  Multifactorial secondary to lymphedema and CHF exacerbation   Wound care consulted, recommended outpatient referral to lymphedema clinic  Improved with diuresis        Final Active Diagnoses:    Diagnosis Date Noted POA    PRINCIPAL PROBLEM:  Atrial flutter [I48.92] 12/10/2023 Yes    GRAHAM (acute kidney injury) [N17.9] 12/17/2023 No    Lymphedema [I89.0] 12/15/2023 Yes    Lower extremity edema [R60.0] 12/10/2023 Yes    Scrotal swelling [N50.89] 12/10/2023 Yes    Paroxysmal A-fib [I48.0] 03/11/2022 Yes    Atherosclerosis of native coronary artery of native heart without angina pectoris [I25.10] 03/04/2022 Yes    Morbid obesity with body mass index (BMI) of 40.0 to 44.9 in adult [E66.01, Z68.41] 03/04/2022 Not Applicable    Hypertension [I10] 03/04/2022 Yes    Hyperthyroidism [E05.90] 01/13/2021 Yes      Problems Resolved During  this Admission:    Diagnosis Date Noted Date Resolved POA    SOB (shortness of breath) [R06.02] 12/10/2023 12/19/2023 Yes    Acute exacerbation of CHF (congestive heart failure) [I50.9] 01/13/2021 12/19/2023 Yes    Edema [R60.9] 01/13/2021 12/10/2023 Yes       Discharged Condition: fair    Disposition: Home-Health Care c    Follow Up:   Follow-up Information       Janis Green NP Follow up in 3 day(s).    Specialty: Internal Medicine  Contact information:  [address]  [place] [id number]  [phone]               Vazquez Decker MD Follow up in 1 week(s).    Specialties: Interventional Cardiology, Cardiology  Contact information:  [address] [place] [address] [address] [place] [address]  [phone]                           Patient Instructions:      Ambulatory referral/consult to Physical/Occupational Therapy   Standing Status: Future   Referral Priority: Routine Referral Type: Physical Medicine   Referral Reason: Specialty Services Required   Number of Visits Requested: 1     Ambulatory referral/consult to Congestive Heart Failure Clinic   Standing Status: Future   Referral Priority: Routine Referral Type: Consultation   Referral Reason: Specialty Services Required   Requested Specialty: Cardiology   Number of Visits Requested: 1     Ambulatory referral/consult to [facility]   Standing Status: Future   Referral Priority: Routine Referral Type: Consultation   Referral Reason: Specialty Services Required   Number of Visits Requested: 1     Ambulatory referral/consult to Home Health   Standing Status: Future   Referral Priority: Routine Referral Type: Home Health   Referral Reason: Specialty Services Required   Requested Specialty: Home Health Services   Number of Visits Requested: 1     Diet Cardiac   Order Comments: 1.5L fluid restreiction     Diet diabetic     Call MD for:  temperature >100.4     Call MD for:  persistent nausea and vomiting or diarrhea     Call MD for:  severe  uncontrolled pain     Call MD for:  redness, tenderness, or signs of infection (pain, swelling, redness, odor or green/yellow discharge around incision site)     Call MD for:  difficulty breathing or increased cough     Call MD for:  severe persistent headache     Call MD for:  worsening rash     Call MD for:  persistent dizziness, light-headedness, or visual disturbances     Call MD for:  increased confusion or weakness     Activity as tolerated   Order Comments: Ot/pt eval and treat     Imaging Results              US Scrotum And Testicles (Final result)  Result time 12/10/23 08:15:56      Final result by Cole Cintron MD (12/10/23 08:15:56)                   Impression:      Large bilateral hydrocele.  Extensive scrotal wall thickening or edema.  No definite abscess.  Negative testicular ultrasound with no torsion identified.      Electronically signed by: Cole Cintron MD  Date:    12/10/2023  Time:    08:15               Narrative:    EXAMINATION:  US SCROTUM AND TESTICLES    CLINICAL HISTORY:  scrotal pain and swelling;    COMPARISON:  None    FINDINGS:  The right testicle is 2.8 x 3.4 x 4.9cm.  Right epididymal head is9 x 16mm.  8.6 mm right epididymal head cyst.  Normal blood flow.  Large hydrocele.    The left testicle is 2.8 x 3.6 x 4.3cm.  Left epididymal head is 15 x 20mm.  8.2 mm left epididymal head cyst is present.  Normal blood flow.  Very large hydrocele.    Extensive scrotal wall thickening or edema is present.  No abscess.                                       X-Ray Chest AP Portable (Final result)  Result time 12/09/23 23:00:15      Final result by Hunter Munoz MD (12/09/23 23:00:15)                   Impression:      No acute abnormality.      Electronically signed by: Hunter Munoz  Date:    12/09/2023  Time:    23:00               Narrative:    EXAMINATION:  XR CHEST AP PORTABLE    CLINICAL HISTORY:  Chest Pain;    TECHNIQUE:  Single frontal view of the chest was  performed.    COMPARISON:  None    FINDINGS:  Low lung volumes.  Prominent cardiac silhouette.  No acute process seen    Bones are intact.                                      Significant Diagnostic Studies: Labs: All labs within the past 24 hours have been reviewed    Pending Diagnostic Studies:       None           Medications:  Reconciled Home Medications:      Medication List        START taking these medications      aspirin 81 MG Chew  Take 1 tablet (81 mg total) by mouth once daily.            CHANGE how you take these medications      metoprolol succinate 50 MG 24 hr tablet  Commonly known as: TOPROL-XL  Take 1 tablet (50 mg total) by mouth 2 (two) times daily.  What changed: when to take this            CONTINUE taking these medications      apixaban 5 mg Tab  Commonly known as: ELIQUIS  Take 1 tablet (5 mg total) by mouth 2 (two) times daily.     cetirizine 10 MG tablet  Commonly known as: ZYRTEC  Take 1 tablet (10 mg total) by mouth once daily.     furosemide 40 MG tablet  Commonly known as: LASIX  Take 1 tablet (40 mg total) by mouth once daily.     methIMAzole 5 MG Tab  Commonly known as: TAPAZOLE  Take 1 tablet (5 mg total) by mouth once daily.            STOP taking these medications      amLODIPine 10 MG tablet  Commonly known as: NORVASC     lisinopriL 20 MG tablet  Commonly known as: PRINIVIL,ZESTRIL     metFORMIN 500 MG tablet  Commonly known as: GLUCOPHAGE              Indwelling Lines/Drains at time of discharge:   Lines/Drains/Airways       None                   Time spent on the discharge of patient: 42 minutes         Verena Galarza MD  Department of Hospital Medicine  [facility] - Med Surg

## 2024-01-05 NOTE — ASSESSMENT & PLAN NOTE
Patient with Paroxysmal (<7 days) atrial fibrillation which is uncontrolled currently with Beta Blocker and Calcium Channel Blocker. Patient is currently in atrial fibrillation.[id number] Score: 3. Anticoagulation indicated. Anticoagulation done with Eliquis .

## 2024-01-10 DIAGNOSIS — E11.9 TYPE 2 DIABETES MELLITUS WITHOUT COMPLICATION: ICD-10-CM

## 2024-02-06 ENCOUNTER — TELEPHONE (OUTPATIENT)
Dept: INTERNAL MEDICINE | Facility: CLINIC | Age: 69
End: 2024-02-06
Payer: COMMERCIAL

## 2024-02-06 NOTE — TELEPHONE ENCOUNTER
I called the pt to reschedule his appt on 02/12/24 w/ Janis KU and assisted him with rescheduling for 02/22/24 . He verbalized understanding of new appt. //kah

## 2024-02-22 ENCOUNTER — OFFICE VISIT (OUTPATIENT)
Dept: INTERNAL MEDICINE | Facility: CLINIC | Age: 69
End: 2024-02-22
Payer: COMMERCIAL

## 2024-02-22 ENCOUNTER — LAB VISIT (OUTPATIENT)
Dept: LAB | Facility: HOSPITAL | Age: 69
End: 2024-02-22
Attending: NURSE PRACTITIONER
Payer: COMMERCIAL

## 2024-02-22 VITALS
OXYGEN SATURATION: 95 % | BODY MASS INDEX: 42.88 KG/M2 | RESPIRATION RATE: 18 BRPM | DIASTOLIC BLOOD PRESSURE: 74 MMHG | TEMPERATURE: 98 F | HEART RATE: 99 BPM | WEIGHT: 306.31 LBS | HEIGHT: 71 IN | SYSTOLIC BLOOD PRESSURE: 142 MMHG

## 2024-02-22 DIAGNOSIS — I10 PRIMARY HYPERTENSION: ICD-10-CM

## 2024-02-22 DIAGNOSIS — Z12.5 PROSTATE CANCER SCREENING: ICD-10-CM

## 2024-02-22 DIAGNOSIS — E11.9 DIABETES MELLITUS WITH NO COMPLICATION: ICD-10-CM

## 2024-02-22 DIAGNOSIS — Z00.00 ROUTINE MEDICAL EXAM: Primary | ICD-10-CM

## 2024-02-22 DIAGNOSIS — I50.20 SYSTOLIC CONGESTIVE HEART FAILURE, UNSPECIFIED HF CHRONICITY: ICD-10-CM

## 2024-02-22 LAB
ALBUMIN/CREAT UR: 3.8 UG/MG (ref 0–30)
CREAT UR-MCNC: 183 MG/DL (ref 23–375)
ESTIMATED AVG GLUCOSE: 137 MG/DL (ref 68–131)
HBA1C MFR BLD: 6.4 % (ref 4–5.6)
MICROALBUMIN UR DL<=1MG/L-MCNC: 7 UG/ML

## 2024-02-22 PROCEDURE — 3061F NEG MICROALBUMINURIA REV: CPT | Mod: CPTII,S$GLB,, | Performed by: NURSE PRACTITIONER

## 2024-02-22 PROCEDURE — 1125F AMNT PAIN NOTED PAIN PRSNT: CPT | Mod: CPTII,S$GLB,, | Performed by: NURSE PRACTITIONER

## 2024-02-22 PROCEDURE — 84153 ASSAY OF PSA TOTAL: CPT | Performed by: NURSE PRACTITIONER

## 2024-02-22 PROCEDURE — 1101F PT FALLS ASSESS-DOCD LE1/YR: CPT | Mod: CPTII,S$GLB,, | Performed by: NURSE PRACTITIONER

## 2024-02-22 PROCEDURE — 3044F HG A1C LEVEL LT 7.0%: CPT | Mod: CPTII,S$GLB,, | Performed by: NURSE PRACTITIONER

## 2024-02-22 PROCEDURE — 99214 OFFICE O/P EST MOD 30 MIN: CPT | Mod: S$GLB,,, | Performed by: NURSE PRACTITIONER

## 2024-02-22 PROCEDURE — 3066F NEPHROPATHY DOC TX: CPT | Mod: CPTII,S$GLB,, | Performed by: NURSE PRACTITIONER

## 2024-02-22 PROCEDURE — 84443 ASSAY THYROID STIM HORMONE: CPT | Performed by: NURSE PRACTITIONER

## 2024-02-22 PROCEDURE — 1159F MED LIST DOCD IN RCRD: CPT | Mod: CPTII,S$GLB,, | Performed by: NURSE PRACTITIONER

## 2024-02-22 PROCEDURE — 3288F FALL RISK ASSESSMENT DOCD: CPT | Mod: CPTII,S$GLB,, | Performed by: NURSE PRACTITIONER

## 2024-02-22 PROCEDURE — 3077F SYST BP >= 140 MM HG: CPT | Mod: CPTII,S$GLB,, | Performed by: NURSE PRACTITIONER

## 2024-02-22 PROCEDURE — 36415 COLL VENOUS BLD VENIPUNCTURE: CPT | Mod: PN | Performed by: NURSE PRACTITIONER

## 2024-02-22 PROCEDURE — 83036 HEMOGLOBIN GLYCOSYLATED A1C: CPT | Performed by: NURSE PRACTITIONER

## 2024-02-22 PROCEDURE — 3008F BODY MASS INDEX DOCD: CPT | Mod: CPTII,S$GLB,, | Performed by: NURSE PRACTITIONER

## 2024-02-22 PROCEDURE — 84439 ASSAY OF FREE THYROXINE: CPT | Performed by: NURSE PRACTITIONER

## 2024-02-22 PROCEDURE — 1160F RVW MEDS BY RX/DR IN RCRD: CPT | Mod: CPTII,S$GLB,, | Performed by: NURSE PRACTITIONER

## 2024-02-22 PROCEDURE — 3078F DIAST BP <80 MM HG: CPT | Mod: CPTII,S$GLB,, | Performed by: NURSE PRACTITIONER

## 2024-02-22 PROCEDURE — 99999 PR PBB SHADOW E&M-EST. PATIENT-LVL IV: CPT | Mod: PBBFAC,,, | Performed by: NURSE PRACTITIONER

## 2024-02-22 PROCEDURE — 82043 UR ALBUMIN QUANTITATIVE: CPT | Performed by: NURSE PRACTITIONER

## 2024-02-22 NOTE — PROGRESS NOTES
Subjective     Patient ID: Roland Ng is a 68 y.o. male.    Chief Complaint: Follow-up (3 mo)    Patient presents for routine exam.      Medical history: Edema, Hyperthyroidism, CHF, HTN, Obesity, Atherosclerosis of native Coronary artery of native heart without angina pectoris, A-fib, MI    Had admission in 12/2023--[facility].    Hospital Course:   Cardiology consulted.  Patient currently being treated for CHF exacerbation with IV diuretics.  Given persistent atrial flutter, planned for ZOE/cardioversion once euvolemic.     PT/OT recommends moderate intensity therapy on discharge. SW consulted for SNF placement.  Has been accepted at [facility], insurance authorization obtained.     Tentatively planned for zoe/cardioversion on Monday 12/18/2023.     Of note, patient recorded to have multiple instances of heart rate in the 40s on EMR.  However telemetry review did not show any bradycardia, patient's heart rate has been staying mostly in the 80s and occasionally goes up over 100s for short period of time.  S/p cardioversion. And will dc on eliquis to follow up with cards.     Hospital course complicated by GRAHAM likely iatrogenic from diuresis.  Diuretics held.     Pt seen and examined on day of dc and stable for dc.      No follow up with cardiology.  Reports being upset before discharge.  Did not want to go into rehab and have home health.  Does not want to follow up with [facility] provider.     Follow-up  Pertinent negatives include no abdominal pain, arthralgias, chest pain, chills, coughing, fatigue or fever.     Review of Systems   Constitutional:  Negative for chills, fatigue and fever.   Respiratory:  Positive for shortness of breath (improved). Negative for cough.    Cardiovascular:  Positive for leg swelling. Negative for chest pain and palpitations.   Gastrointestinal:  Positive for abdominal distention. Negative for abdominal pain.   Musculoskeletal:  Positive for gait problem. Negative for  arthralgias.   Psychiatric/Behavioral:  Negative for agitation and confusion.           Objective     Physical Exam  Vitals reviewed.   Constitutional:       Appearance: He is obese.   HENT:      Head: Normocephalic.      Right Ear: Tympanic membrane normal.      Left Ear: Tympanic membrane normal.   Cardiovascular:      Rate and Rhythm: Normal rate and regular rhythm.   Pulmonary:      Effort: Pulmonary effort is normal.      Breath sounds: Normal breath sounds.   Abdominal:      General: Bowel sounds are normal. There is distension.      Tenderness: There is no abdominal tenderness.   Musculoskeletal:         General: Swelling present.   Skin:     General: Skin is warm.   Neurological:      General: No focal deficit present.      Mental Status: He is alert and oriented to person, place, and time.   Psychiatric:         Mood and Affect: Mood normal.         Behavior: Behavior is cooperative.            Assessment and Plan     1. Routine medical exam    2. Systolic congestive heart failure, unspecified HF chronicity  -     Ambulatory referral/consult to Cardiology; Future; Expected date: 02/29/2024    3. Primary hypertension  -     TSH; Future; Expected date: 02/22/2024    4. Diabetes mellitus with no complication  -     Microalbumin/Creatinine Ratio, Urine; Future; Expected date: 02/22/2024  -     HEMOGLOBIN A1C; Future; Expected date: 02/22/2024    5. Prostate cancer screening  -     PSA, SCREENING; Future; Expected date: 02/22/2024      Labs today     Fax referral to [facility]-cardiology     Three month--routine exam     [facility]---Celi PETERS) eye exam          Follow up in about 3 months (around 5/22/2024).

## 2024-02-23 DIAGNOSIS — R97.20 ELEVATED PSA: Primary | ICD-10-CM

## 2024-02-23 LAB
COMPLEXED PSA SERPL-MCNC: 6.3 NG/ML (ref 0–4)
T4 FREE SERPL-MCNC: 0.91 NG/DL (ref 0.71–1.51)
TSH SERPL DL<=0.005 MIU/L-ACNC: 0.16 UIU/ML (ref 0.4–4)

## 2024-02-28 ENCOUNTER — PATIENT OUTREACH (OUTPATIENT)
Dept: ADMINISTRATIVE | Facility: HOSPITAL | Age: 69
End: 2024-02-28
Payer: COMMERCIAL

## 2024-02-28 NOTE — PROGRESS NOTES
[place] uploaded and faxed to [facility] for DM Eye Exam  F: [phone]-[address]  F: [phone]-[facility]

## 2024-03-06 NOTE — PROGRESS NOTES
2nd Req-[place] faxed to [facility] for DM Eye Exam  F: [phone]-[address]  F: [phone]-[facility]

## 2024-03-13 NOTE — PROGRESS NOTES
Population Health Chart Review & Patient Outreach Details      Additional [facility] Health Notes:    Called patient to discuss 02-02-24 eye exam. No answer. No VM setup. Reminder set to call patient again in 1 week           Updates Requested / Reviewed:      Updated Care Coordination Note, Care Everywhere, and Immunizations Reconciliation Completed or Queried: [place]         Health Maintenance Topics Overdue:      [facility] Score: 3     Colon Cancer Screening  Eye Exam  Uncontrolled BP    Influenza Vaccine  Pneumonia Vaccine  Tetanus Vaccine  Shingles/Zoster Vaccine  RSV Vaccine                  Health Maintenance Topic(s) Outreach Outcomes & Actions Taken:    Eye Exam - Outreach Outcomes & Actions Taken  : External Records Requested & Care Team Updated if Applicable

## 2024-03-18 PROBLEM — N17.9 AKI (ACUTE KIDNEY INJURY): Status: RESOLVED | Noted: 2023-12-17 | Resolved: 2024-03-18

## 2024-03-25 ENCOUNTER — TELEPHONE (OUTPATIENT)
Dept: INTERNAL MEDICINE | Facility: CLINIC | Age: 69
End: 2024-03-25
Payer: COMMERCIAL

## 2024-03-25 NOTE — TELEPHONE ENCOUNTER
----- Message from Roxanne Zimmer sent at 3/25/2024 11:35 AM CDT -----  Contact: Eloise/Travon Navas from [employer] called to notify you all that they completed the patients health assessment and care plan and it can be accessed through the availity. If needed you can call Eloise back at [phone]

## 2024-08-06 ENCOUNTER — OFFICE VISIT (OUTPATIENT)
Dept: INTERNAL MEDICINE | Facility: CLINIC | Age: 69
End: 2024-08-06
Payer: COMMERCIAL

## 2024-08-06 ENCOUNTER — LAB VISIT (OUTPATIENT)
Dept: LAB | Facility: HOSPITAL | Age: 69
End: 2024-08-06
Attending: NURSE PRACTITIONER
Payer: COMMERCIAL

## 2024-08-06 VITALS
BODY MASS INDEX: 43.13 KG/M2 | SYSTOLIC BLOOD PRESSURE: 138 MMHG | TEMPERATURE: 98 F | HEIGHT: 71 IN | OXYGEN SATURATION: 97 % | WEIGHT: 308.06 LBS | DIASTOLIC BLOOD PRESSURE: 66 MMHG | HEART RATE: 95 BPM | RESPIRATION RATE: 18 BRPM

## 2024-08-06 DIAGNOSIS — E66.01 MORBID OBESITY WITH BODY MASS INDEX (BMI) OF 40.0 TO 44.9 IN ADULT: ICD-10-CM

## 2024-08-06 DIAGNOSIS — I50.9 CHRONIC CONGESTIVE HEART FAILURE, UNSPECIFIED HEART FAILURE TYPE: ICD-10-CM

## 2024-08-06 DIAGNOSIS — Z48.02 ENCOUNTER FOR REMOVAL OF SUTURES: Primary | ICD-10-CM

## 2024-08-06 DIAGNOSIS — I25.10 ATHEROSCLEROSIS OF NATIVE CORONARY ARTERY OF NATIVE HEART WITHOUT ANGINA PECTORIS: ICD-10-CM

## 2024-08-06 DIAGNOSIS — E11.9 DIABETES MELLITUS WITH NO COMPLICATION: ICD-10-CM

## 2024-08-06 LAB
ALBUMIN SERPL BCP-MCNC: 3.3 G/DL (ref 3.5–5.2)
ALP SERPL-CCNC: 64 U/L (ref 55–135)
ALT SERPL W/O P-5'-P-CCNC: 11 U/L (ref 10–44)
ANION GAP SERPL CALC-SCNC: 5 MMOL/L (ref 8–16)
AST SERPL-CCNC: 13 U/L (ref 10–40)
BILIRUB SERPL-MCNC: 0.4 MG/DL (ref 0.1–1)
BUN SERPL-MCNC: 11 MG/DL (ref 8–23)
CALCIUM SERPL-MCNC: 8.9 MG/DL (ref 8.7–10.5)
CHLORIDE SERPL-SCNC: 107 MMOL/L (ref 95–110)
CHOLEST SERPL-MCNC: 159 MG/DL (ref 120–199)
CHOLEST/HDLC SERPL: 3.8 {RATIO} (ref 2–5)
CO2 SERPL-SCNC: 27 MMOL/L (ref 23–29)
CREAT SERPL-MCNC: 1 MG/DL (ref 0.5–1.4)
EST. GFR  (NO RACE VARIABLE): >60 ML/MIN/1.73 M^2
ESTIMATED AVG GLUCOSE: 120 MG/DL (ref 68–131)
GLUCOSE SERPL-MCNC: 116 MG/DL (ref 70–110)
HBA1C MFR BLD: 5.8 % (ref 4–5.6)
HDLC SERPL-MCNC: 42 MG/DL (ref 40–75)
HDLC SERPL: 26.4 % (ref 20–50)
LDLC SERPL CALC-MCNC: 100.6 MG/DL (ref 63–159)
NONHDLC SERPL-MCNC: 117 MG/DL
POTASSIUM SERPL-SCNC: 3.8 MMOL/L (ref 3.5–5.1)
PROT SERPL-MCNC: 7.4 G/DL (ref 6–8.4)
SODIUM SERPL-SCNC: 139 MMOL/L (ref 136–145)
TRIGL SERPL-MCNC: 82 MG/DL (ref 30–150)

## 2024-08-06 PROCEDURE — 83036 HEMOGLOBIN GLYCOSYLATED A1C: CPT | Performed by: NURSE PRACTITIONER

## 2024-08-06 PROCEDURE — 36415 COLL VENOUS BLD VENIPUNCTURE: CPT | Mod: PN | Performed by: NURSE PRACTITIONER

## 2024-08-06 PROCEDURE — 99999 PR PBB SHADOW E&M-EST. PATIENT-LVL IV: CPT | Mod: PBBFAC,,, | Performed by: NURSE PRACTITIONER

## 2024-08-06 PROCEDURE — 3066F NEPHROPATHY DOC TX: CPT | Mod: CPTII,S$GLB,, | Performed by: NURSE PRACTITIONER

## 2024-08-06 PROCEDURE — 3044F HG A1C LEVEL LT 7.0%: CPT | Mod: CPTII,S$GLB,, | Performed by: NURSE PRACTITIONER

## 2024-08-06 PROCEDURE — 80061 LIPID PANEL: CPT | Performed by: NURSE PRACTITIONER

## 2024-08-06 PROCEDURE — 3288F FALL RISK ASSESSMENT DOCD: CPT | Mod: CPTII,S$GLB,, | Performed by: NURSE PRACTITIONER

## 2024-08-06 PROCEDURE — 80053 COMPREHEN METABOLIC PANEL: CPT | Performed by: NURSE PRACTITIONER

## 2024-08-06 PROCEDURE — 3078F DIAST BP <80 MM HG: CPT | Mod: CPTII,S$GLB,, | Performed by: NURSE PRACTITIONER

## 2024-08-06 PROCEDURE — 3061F NEG MICROALBUMINURIA REV: CPT | Mod: CPTII,S$GLB,, | Performed by: NURSE PRACTITIONER

## 2024-08-06 PROCEDURE — 99214 OFFICE O/P EST MOD 30 MIN: CPT | Mod: S$GLB,,, | Performed by: NURSE PRACTITIONER

## 2024-08-06 PROCEDURE — 1159F MED LIST DOCD IN RCRD: CPT | Mod: CPTII,S$GLB,, | Performed by: NURSE PRACTITIONER

## 2024-08-06 PROCEDURE — 1126F AMNT PAIN NOTED NONE PRSNT: CPT | Mod: CPTII,S$GLB,, | Performed by: NURSE PRACTITIONER

## 2024-08-06 PROCEDURE — 3075F SYST BP GE 130 - 139MM HG: CPT | Mod: CPTII,S$GLB,, | Performed by: NURSE PRACTITIONER

## 2024-08-06 PROCEDURE — 3008F BODY MASS INDEX DOCD: CPT | Mod: CPTII,S$GLB,, | Performed by: NURSE PRACTITIONER

## 2024-08-06 PROCEDURE — 1160F RVW MEDS BY RX/DR IN RCRD: CPT | Mod: CPTII,S$GLB,, | Performed by: NURSE PRACTITIONER

## 2024-08-06 PROCEDURE — 1100F PTFALLS ASSESS-DOCD GE2>/YR: CPT | Mod: CPTII,S$GLB,, | Performed by: NURSE PRACTITIONER

## 2024-08-06 RX ORDER — CETIRIZINE HYDROCHLORIDE 10 MG/1
10 TABLET, FILM COATED ORAL
Qty: 90 TABLET | Refills: 3 | Status: SHIPPED | OUTPATIENT
Start: 2024-08-06

## 2024-08-06 RX ORDER — METHIMAZOLE 5 MG/1
5 TABLET ORAL DAILY
Qty: 90 TABLET | Refills: 3 | Status: SHIPPED | OUTPATIENT
Start: 2024-08-06

## 2024-08-06 RX ORDER — LEVOCETIRIZINE DIHYDROCHLORIDE 5 MG/1
5 TABLET, FILM COATED ORAL NIGHTLY
Qty: 90 TABLET | Refills: 3 | Status: SHIPPED | OUTPATIENT
Start: 2024-08-06 | End: 2025-08-06

## 2024-08-06 RX ORDER — METOPROLOL SUCCINATE 50 MG/1
50 TABLET, EXTENDED RELEASE ORAL 2 TIMES DAILY
Qty: 180 TABLET | Refills: 3 | Status: SHIPPED | OUTPATIENT
Start: 2024-08-06

## 2024-08-06 RX ORDER — CETIRIZINE HYDROCHLORIDE 10 MG/1
10 TABLET ORAL DAILY
Qty: 90 TABLET | Refills: 3 | Status: SHIPPED | OUTPATIENT
Start: 2024-08-06 | End: 2024-08-06

## 2024-08-06 RX ORDER — CLINDAMYCIN HYDROCHLORIDE 300 MG/1
CAPSULE ORAL
COMMUNITY
Start: 2024-07-06

## 2024-08-06 RX ORDER — FUROSEMIDE 40 MG/1
40 TABLET ORAL DAILY
Qty: 90 TABLET | Refills: 3 | Status: SHIPPED | OUTPATIENT
Start: 2024-08-06 | End: 2025-08-06

## 2024-08-06 NOTE — PROGRESS NOTES
Subjective     Patient ID: Roland Ng is a 69 y.o. male.    Chief Complaint: Suture / Staple Removal (Lt lower extremity denies any pain x 3 wks ago approx)    Patient presents for suture removal.  Reports laceration occurred about 3 weeks ago when walking up steps.  Cut on tin.  Was not able to get in sooner for removal.  Reports area is feeling better.      Did not have cardiology visit.  Unable to go.      Medical history: Edema, Hyperthyroidism, CHF, HTN, Obesity, Atherosclerosis of native Coronary artery of native heart without angina pectoris, A-fib, MI        Suture / Staple Removal      Review of Systems   Constitutional:  Negative for chills and fever.   Respiratory:  Negative for cough and shortness of breath.    Cardiovascular:  Positive for leg swelling.   Gastrointestinal:  Negative for abdominal pain, constipation and diarrhea.   Musculoskeletal:  Positive for arthralgias and joint swelling.   Psychiatric/Behavioral:  Negative for agitation and confusion.           Objective     Physical Exam  Vitals reviewed.   Constitutional:       Appearance: He is obese.   Cardiovascular:      Rate and Rhythm: Normal rate.   Pulmonary:      Effort: Pulmonary effort is normal. No respiratory distress.   Musculoskeletal:         General: Swelling present.   Skin:     General: Skin is warm.      Comments: Reported 14 sutures placed.  Scabbed area.  Soften with a small amount of normal saline.  7 sutures removed.    Neurological:      General: No focal deficit present.      Mental Status: He is alert.   Psychiatric:         Mood and Affect: Mood normal.         Behavior: Behavior is cooperative.            Assessment and Plan     1. Encounter for removal of sutures  Comments:  Tolerated well.    2. Diabetes mellitus with no complication  Comments:  Routine labs today.  Continue cardiac/diabetic diet.  Orders:  -     HEMOGLOBIN A1C; Future; Expected date: 08/06/2024  -     Comprehensive Metabolic Panel;  Future; Expected date: 08/06/2024  -     LIPID PANEL; Future; Expected date: 08/06/2024    3. Morbid obesity with body mass index (BMI) of 40.0 to 44.9 in adult  Comments:  Cardiac diet.  Be active.    4. Chronic congestive heart failure, unspecified heart failure type  Comments:  Labs today.  Low/no sodium diet.  Weight daily.  Due to see cardiology  Orders:  -     methIMAzole (TAPAZOLE) 5 MG Tab; Take 1 tablet (5 mg total) by mouth once daily.  Dispense: 90 tablet; Refill: 3  -     furosemide (LASIX) 40 MG tablet; Take 1 tablet (40 mg total) by mouth once daily.  Dispense: 90 tablet; Refill: 3    5. Atherosclerosis of native coronary artery of native heart without angina pectoris  Comments:  Labs today.  Continue Eliquis.  Overview:  had MI 2014    Orders:  -     apixaban (ELIQUIS) 5 mg Tab; Take 1 tablet (5 mg total) by mouth 2 (two) times daily.  Dispense: 180 tablet; Refill: 3    Other orders  -     metoprolol succinate (TOPROL-XL) 50 MG 24 hr tablet; Take 1 tablet (50 mg total) by mouth 2 (two) times daily.  Dispense: 180 tablet; Refill: 3  -     Discontinue: cetirizine (ZYRTEC) 10 MG tablet; Take 1 tablet (10 mg total) by mouth once daily.  Dispense: 90 tablet; Refill: 3             Follow up in about 3 months (around 11/6/2024).

## 2024-08-13 ENCOUNTER — TELEPHONE (OUTPATIENT)
Dept: INTERNAL MEDICINE | Facility: CLINIC | Age: 69
End: 2024-08-13
Payer: COMMERCIAL

## 2024-08-13 NOTE — TELEPHONE ENCOUNTER
----- Message from Valeria Benedict sent at 8/13/2024 11:15 AM CDT -----  Type : Patient Call      Who Called : Patient      Does the patient know what this is regarding?: Pt is requesting a callback regarding his recent lab results ; please advise        Would the patient rather a call back or a response via [facility]? Call      Best Call Back Number: [phone]      Additional Information:

## 2024-11-07 ENCOUNTER — PATIENT OUTREACH (OUTPATIENT)
Dept: ADMINISTRATIVE | Facility: HOSPITAL | Age: 69
End: 2024-11-07
Payer: COMMERCIAL

## 2025-01-03 ENCOUNTER — LAB VISIT (OUTPATIENT)
Dept: LAB | Facility: HOSPITAL | Age: 70
End: 2025-01-03
Attending: NURSE PRACTITIONER
Payer: COMMERCIAL

## 2025-01-03 ENCOUNTER — OFFICE VISIT (OUTPATIENT)
Dept: INTERNAL MEDICINE | Facility: CLINIC | Age: 70
End: 2025-01-03
Payer: COMMERCIAL

## 2025-01-03 VITALS
DIASTOLIC BLOOD PRESSURE: 90 MMHG | BODY MASS INDEX: 43.65 KG/M2 | TEMPERATURE: 99 F | RESPIRATION RATE: 18 BRPM | SYSTOLIC BLOOD PRESSURE: 140 MMHG | HEART RATE: 90 BPM | OXYGEN SATURATION: 96 % | HEIGHT: 71 IN | WEIGHT: 311.75 LBS

## 2025-01-03 DIAGNOSIS — I50.20 SYSTOLIC CONGESTIVE HEART FAILURE, UNSPECIFIED HF CHRONICITY: ICD-10-CM

## 2025-01-03 DIAGNOSIS — R97.20 ELEVATED PSA: ICD-10-CM

## 2025-01-03 DIAGNOSIS — Z00.00 ANNUAL PHYSICAL EXAM: Primary | ICD-10-CM

## 2025-01-03 DIAGNOSIS — I10 PRIMARY HYPERTENSION: ICD-10-CM

## 2025-01-03 DIAGNOSIS — I25.10 ATHEROSCLEROSIS OF NATIVE CORONARY ARTERY OF NATIVE HEART WITHOUT ANGINA PECTORIS: ICD-10-CM

## 2025-01-03 DIAGNOSIS — R60.9 EDEMA, UNSPECIFIED TYPE: ICD-10-CM

## 2025-01-03 DIAGNOSIS — E11.9 DIABETES MELLITUS WITH NO COMPLICATION: ICD-10-CM

## 2025-01-03 DIAGNOSIS — I48.0 PAROXYSMAL A-FIB: ICD-10-CM

## 2025-01-03 DIAGNOSIS — I25.2 HISTORY OF MI (MYOCARDIAL INFARCTION): ICD-10-CM

## 2025-01-03 DIAGNOSIS — I10 PRIMARY HYPERTENSION: Primary | ICD-10-CM

## 2025-01-03 LAB
ANION GAP SERPL CALC-SCNC: 10 MMOL/L (ref 8–16)
BASOPHILS # BLD AUTO: 0.01 K/UL (ref 0–0.2)
BASOPHILS NFR BLD: 0.1 % (ref 0–1.9)
BNP SERPL-MCNC: <10 PG/ML (ref 0–99)
BUN SERPL-MCNC: 9 MG/DL (ref 8–23)
CALCIUM SERPL-MCNC: 9.2 MG/DL (ref 8.7–10.5)
CHLORIDE SERPL-SCNC: 108 MMOL/L (ref 95–110)
CO2 SERPL-SCNC: 22 MMOL/L (ref 23–29)
CREAT SERPL-MCNC: 1 MG/DL (ref 0.5–1.4)
DIFFERENTIAL METHOD BLD: ABNORMAL
EOSINOPHIL # BLD AUTO: 0.1 K/UL (ref 0–0.5)
EOSINOPHIL NFR BLD: 0.7 % (ref 0–8)
ERYTHROCYTE [DISTWIDTH] IN BLOOD BY AUTOMATED COUNT: 17.6 % (ref 11.5–14.5)
EST. GFR  (NO RACE VARIABLE): >60 ML/MIN/1.73 M^2
ESTIMATED AVG GLUCOSE: 126 MG/DL (ref 68–131)
GLUCOSE SERPL-MCNC: 108 MG/DL (ref 70–110)
HBA1C MFR BLD: 6 % (ref 4–5.6)
HCT VFR BLD AUTO: 49.4 % (ref 40–54)
HGB BLD-MCNC: 15.6 G/DL (ref 14–18)
IMM GRANULOCYTES # BLD AUTO: 0.02 K/UL (ref 0–0.04)
IMM GRANULOCYTES NFR BLD AUTO: 0.2 % (ref 0–0.5)
LYMPHOCYTES # BLD AUTO: 2.3 K/UL (ref 1–4.8)
LYMPHOCYTES NFR BLD: 24.8 % (ref 18–48)
MCH RBC QN AUTO: 25.7 PG (ref 27–31)
MCHC RBC AUTO-ENTMCNC: 31.6 G/DL (ref 32–36)
MCV RBC AUTO: 81 FL (ref 82–98)
MONOCYTES # BLD AUTO: 0.8 K/UL (ref 0.3–1)
MONOCYTES NFR BLD: 8.7 % (ref 4–15)
NEUTROPHILS # BLD AUTO: 6 K/UL (ref 1.8–7.7)
NEUTROPHILS NFR BLD: 65.5 % (ref 38–73)
NRBC BLD-RTO: 0 /100 WBC
PLATELET # BLD AUTO: 261 K/UL (ref 150–450)
PMV BLD AUTO: 11.8 FL (ref 9.2–12.9)
POTASSIUM SERPL-SCNC: 4 MMOL/L (ref 3.5–5.1)
RBC # BLD AUTO: 6.08 M/UL (ref 4.6–6.2)
SODIUM SERPL-SCNC: 140 MMOL/L (ref 136–145)
WBC # BLD AUTO: 9.16 K/UL (ref 3.9–12.7)

## 2025-01-03 PROCEDURE — 3077F SYST BP >= 140 MM HG: CPT | Mod: CPTII,S$GLB,, | Performed by: NURSE PRACTITIONER

## 2025-01-03 PROCEDURE — 99999 PR PBB SHADOW E&M-EST. PATIENT-LVL V: CPT | Mod: PBBFAC,,, | Performed by: NURSE PRACTITIONER

## 2025-01-03 PROCEDURE — 1160F RVW MEDS BY RX/DR IN RCRD: CPT | Mod: CPTII,S$GLB,, | Performed by: NURSE PRACTITIONER

## 2025-01-03 PROCEDURE — 84443 ASSAY THYROID STIM HORMONE: CPT | Performed by: NURSE PRACTITIONER

## 2025-01-03 PROCEDURE — 1126F AMNT PAIN NOTED NONE PRSNT: CPT | Mod: CPTII,S$GLB,, | Performed by: NURSE PRACTITIONER

## 2025-01-03 PROCEDURE — 3080F DIAST BP >= 90 MM HG: CPT | Mod: CPTII,S$GLB,, | Performed by: NURSE PRACTITIONER

## 2025-01-03 PROCEDURE — 80048 BASIC METABOLIC PNL TOTAL CA: CPT | Performed by: NURSE PRACTITIONER

## 2025-01-03 PROCEDURE — 36415 COLL VENOUS BLD VENIPUNCTURE: CPT | Mod: PN | Performed by: NURSE PRACTITIONER

## 2025-01-03 PROCEDURE — 1100F PTFALLS ASSESS-DOCD GE2>/YR: CPT | Mod: CPTII,S$GLB,, | Performed by: NURSE PRACTITIONER

## 2025-01-03 PROCEDURE — 83036 HEMOGLOBIN GLYCOSYLATED A1C: CPT | Performed by: NURSE PRACTITIONER

## 2025-01-03 PROCEDURE — 1159F MED LIST DOCD IN RCRD: CPT | Mod: CPTII,S$GLB,, | Performed by: NURSE PRACTITIONER

## 2025-01-03 PROCEDURE — 99397 PER PM REEVAL EST PAT 65+ YR: CPT | Mod: S$GLB,,, | Performed by: NURSE PRACTITIONER

## 2025-01-03 PROCEDURE — 83880 ASSAY OF NATRIURETIC PEPTIDE: CPT | Performed by: NURSE PRACTITIONER

## 2025-01-03 PROCEDURE — 3288F FALL RISK ASSESSMENT DOCD: CPT | Mod: CPTII,S$GLB,, | Performed by: NURSE PRACTITIONER

## 2025-01-03 PROCEDURE — 85025 COMPLETE CBC W/AUTO DIFF WBC: CPT | Performed by: NURSE PRACTITIONER

## 2025-01-03 PROCEDURE — 3008F BODY MASS INDEX DOCD: CPT | Mod: CPTII,S$GLB,, | Performed by: NURSE PRACTITIONER

## 2025-01-03 PROCEDURE — 3044F HG A1C LEVEL LT 7.0%: CPT | Mod: CPTII,S$GLB,, | Performed by: NURSE PRACTITIONER

## 2025-01-03 RX ORDER — FLUTICASONE PROPIONATE 50 MCG
1 SPRAY, SUSPENSION (ML) NASAL
COMMUNITY
Start: 2024-12-09 | End: 2025-12-09

## 2025-01-03 RX ORDER — AMLODIPINE BESYLATE 10 MG/1
10 TABLET ORAL DAILY
Qty: 90 TABLET | Refills: 3 | Status: SHIPPED | OUTPATIENT
Start: 2025-01-03 | End: 2026-01-03

## 2025-01-03 NOTE — PROGRESS NOTES
Patient ID: Roland Ng is a 69 y.o. male.    Chief Complaint: Follow-up (3 month)    History of Present Illness    CHIEF COMPLAINT:  Mr. Ng presents today for annual exam.    CARDIOVASCULAR:  He has not seen cardiology. He denies any changes in shortness of breath. He had suture removal to heart in August and continues to have a scab at the site. He has been out of amlodipine.    MEDICAL:   He has atrial flutter, atrial fibrillation, atherosclerosis of the coronary artery, myocardial infarction, hypertension, congestive heart failure, hyperthyroidism, type 2 diabetes, obesity, and lymphedema.    GENITOURINARY:  He has not seen urology. PSA was 6.3 in February.    PHYSICAL EXAM FINDINGS:  He presents with +2 edema in bilateral lower extremities.      ROS:  Constitutional: negative diminished activity, negative appetite change, negative fatigue, negative fever  HENT: negative ear discharge, negative ear pain, negative mouth sores, negative nosebleeds, negative sore throat, negative tinnitus  Eyes: negative discharge, negative eye redness, negative eye itchiness  Respiratory: negative apnea, negative cough, negative shortness of breath  Cardiovascular: negative chest pain, negative leg swelling  Gastrointestinal: negative abdominal distention, negative abdominal pain, negative blood in stool, negative constipation, negative diarrhea, negative nausea, negative vomiting  Musculoskeletal: negative back pain, negative abnormal gait, negative neck pain, negative neck stiffness, negative joint pain, negative muscle pain  Skin: negative rash, negative wound, negative abnormal moles  Allergic/Immunologic: negative seasonal allergies, negative food allergies  Neurological: negative dizziness, negative seizures, negative numbness, negative tingling, negative headaches, negative balance issues  Psychiatric: negative agitation, negative confusion, negative hallucinations, negative sleep difficulty, negative  suicidal ideation         Physical Exam    Vital Signs: Reviewed.  Constitutional: Well-appearing. Well-developed. No acute distress.  HENT: Normocephalic. Tympanic membranes normal bilaterally. Nares patent. Oropharynx clear. No erythema. No exudate.  Cardiovascular: Normal rate and regular rhythm. Normal heart sounds.  Pulmonary: Pulmonary effort is normal. Normal breath sounds.  Abdominal: Bowel sounds are normal. Abdomen is soft. No tenderness.  Musculoskeletal: No muscle tenderness. No joint tenderness. Normal range of motion.  Extremities: 2+ EDEMA IN BILATERAL LOWER EXTREMITIES.  Skin: Warm. Dry.  SCABBED AREA WHERE SUTURES WERE  Neurological: No focal deficit is present. Alert and oriented to person, place, and time.  Psychiatric: Mood is normal. Behavior is normal. Behavior is cooperative.  Vitals: BLOOD PRESSURE /90.         Assessment & Plan        CARDIOVASCULAR DISEASE: HISTORY OF MI, A-FIB, ATHEROSCLEROTIC HEART DISESSE  - Assessed cardiovascular status; patient is due for cardiology follow-up given history of CHF.  - Mr. Ng denies any changes in shortness of breath.  - Ordered BNP lab test.  - Referred to Cardiology for follow-up.  - Assessed cardiovascular status; patient is due for cardiology follow-up given history of atrial flutter and AFib.  - Referred to Cardiology for follow-up.  - Assessed cardiovascular status; patient is due for cardiology follow-up given history of atherosclerosis.  - Referred to Cardiology for follow-up.  - Assessed cardiovascular status; patient is due for cardiology follow-up given history of MI.  - Referred to Cardiology for follow-up.  - Assessed cardiovascular status; patient is due for cardiology follow-up.  - Referred to Cardiology for follow-up.  - Noted that the patient had suture removal to the heart in August.  - Noted that the patient still has a scab in the area of suture removal.  - Advised the patient to lightly scrub the area daily when  bathing.  - Mr. Ng to lightly scrub left lower leg scab area daily while bathing.    HYPERTENSION:  - Recognized hypertension; patient out of amlodipine, contributing to elevated BP of 140/90.  - Refilled amlodipine 10 mg daily.    ELEVATED PSA:  - Evaluated urological concerns; PSA elevated at 6.3 in February, necessitating urology follow-up.  - Noted that the patient did not see urologist as previously recommended.  - Referred to Urology for elevated PSA.    THYROID DISORDER:  - Ordered TSH lab test.    DIABETES:  - Considered diabetes management; ophthalmology and podiatry referrals indicated.  - Ordered Hemoglobin A1C lab test.  - Referred to Podiatry for diabetic foot exam.  - Referred to Ophthalmology for diabetic eye exam.    EDEMA:  - Noted ongoing lower extremity edema.  - Examined the patient and found +2 edema in bilateral lower extremities.    FOLLOW UP:  - Follow up in 6 months.              Follow up in about 6 months (around 7/3/2025).    This note was generated with the assistance of ambient listening technology. Verbal consent was obtained by the patient and accompanying visitor(s) for the recording of patient appointment to facilitate this note. I attest to having reviewed and edited the generated note for accuracy, though some syntax or spelling errors may persist. Please contact the author of this note for any clarification.

## 2025-01-04 LAB — TSH SERPL DL<=0.005 MIU/L-ACNC: 0.96 UIU/ML (ref 0.4–4)

## 2025-02-06 ENCOUNTER — TELEPHONE (OUTPATIENT)
Dept: INTERNAL MEDICINE | Facility: CLINIC | Age: 70
End: 2025-02-06
Payer: COMMERCIAL

## 2025-02-13 ENCOUNTER — TELEPHONE (OUTPATIENT)
Dept: INTERNAL MEDICINE | Facility: CLINIC | Age: 70
End: 2025-02-13
Payer: COMMERCIAL

## 2025-03-06 ENCOUNTER — PATIENT MESSAGE (OUTPATIENT)
Dept: ADMINISTRATIVE | Facility: HOSPITAL | Age: 70
End: 2025-03-06
Payer: COMMERCIAL

## 2025-03-19 DIAGNOSIS — E11.9 TYPE 2 DIABETES MELLITUS WITHOUT COMPLICATION: ICD-10-CM

## 2025-07-16 DIAGNOSIS — E11.9 TYPE 2 DIABETES MELLITUS WITHOUT COMPLICATION: ICD-10-CM

## 2025-08-19 ENCOUNTER — PATIENT MESSAGE (OUTPATIENT)
Dept: ADMINISTRATIVE | Facility: HOSPITAL | Age: 70
End: 2025-08-19
Payer: COMMERCIAL

## 2025-08-20 DIAGNOSIS — E11.9 TYPE 2 DIABETES MELLITUS WITHOUT COMPLICATION: ICD-10-CM

## 2025-08-29 ENCOUNTER — PATIENT MESSAGE (OUTPATIENT)
Dept: ADMINISTRATIVE | Facility: HOSPITAL | Age: 70
End: 2025-08-29
Payer: COMMERCIAL

## (undated) DEVICE — PAD RADIOLUCENT STAT ADULT